# Patient Record
Sex: MALE | Race: WHITE | NOT HISPANIC OR LATINO | Employment: OTHER | URBAN - METROPOLITAN AREA
[De-identification: names, ages, dates, MRNs, and addresses within clinical notes are randomized per-mention and may not be internally consistent; named-entity substitution may affect disease eponyms.]

---

## 2017-02-28 ENCOUNTER — ALLSCRIPTS OFFICE VISIT (OUTPATIENT)
Dept: OTHER | Facility: OTHER | Age: 72
End: 2017-02-28

## 2017-04-03 ENCOUNTER — HOSPITAL ENCOUNTER (OUTPATIENT)
Dept: RADIOLOGY | Facility: CLINIC | Age: 72
Discharge: HOME | End: 2017-04-03
Admitting: PHYSICIAN ASSISTANT
Payer: MEDICARE

## 2017-04-03 ENCOUNTER — OFFICE VISIT (OUTPATIENT)
Dept: URGENT CARE | Facility: CLINIC | Age: 72
End: 2017-04-03
Payer: MEDICARE

## 2017-04-03 DIAGNOSIS — R05.9 COUGH: ICD-10-CM

## 2017-04-03 PROCEDURE — 71020 HB CHEST X-RAY 2VW FRONTAL&LATL: CPT

## 2017-04-03 PROCEDURE — 94640 AIRWAY INHALATION TREATMENT: CPT

## 2017-04-03 PROCEDURE — G0463 HOSPITAL OUTPT CLINIC VISIT: HCPCS

## 2017-04-03 PROCEDURE — 99214 OFFICE O/P EST MOD 30 MIN: CPT

## 2017-05-23 ENCOUNTER — GENERIC CONVERSION - ENCOUNTER (OUTPATIENT)
Dept: OTHER | Facility: OTHER | Age: 72
End: 2017-05-23

## 2017-05-30 ENCOUNTER — GENERIC CONVERSION - ENCOUNTER (OUTPATIENT)
Dept: OTHER | Facility: OTHER | Age: 72
End: 2017-05-30

## 2017-06-16 ENCOUNTER — APPOINTMENT (OUTPATIENT)
Dept: RADIOLOGY | Facility: MEDICAL CENTER | Age: 72
End: 2017-06-16
Payer: MEDICARE

## 2017-06-16 ENCOUNTER — ALLSCRIPTS OFFICE VISIT (OUTPATIENT)
Dept: OTHER | Facility: OTHER | Age: 72
End: 2017-06-16

## 2017-06-16 ENCOUNTER — GENERIC CONVERSION - ENCOUNTER (OUTPATIENT)
Dept: OTHER | Facility: OTHER | Age: 72
End: 2017-06-16

## 2017-06-16 DIAGNOSIS — I10 ESSENTIAL (PRIMARY) HYPERTENSION: ICD-10-CM

## 2017-06-16 DIAGNOSIS — Z12.5 ENCOUNTER FOR SCREENING FOR MALIGNANT NEOPLASM OF PROSTATE: ICD-10-CM

## 2017-06-16 DIAGNOSIS — M19.011 PRIMARY OSTEOARTHRITIS OF RIGHT SHOULDER: ICD-10-CM

## 2017-06-16 DIAGNOSIS — M25.50 PAIN IN JOINT: ICD-10-CM

## 2017-06-16 PROCEDURE — 72050 X-RAY EXAM NECK SPINE 4/5VWS: CPT

## 2017-06-16 PROCEDURE — 73030 X-RAY EXAM OF SHOULDER: CPT

## 2017-06-22 ENCOUNTER — GENERIC CONVERSION - ENCOUNTER (OUTPATIENT)
Dept: OTHER | Facility: OTHER | Age: 72
End: 2017-06-22

## 2017-06-22 ENCOUNTER — APPOINTMENT (OUTPATIENT)
Dept: PHYSICAL THERAPY | Facility: HOME HEALTHCARE | Age: 72
End: 2017-06-22
Payer: MEDICARE

## 2017-06-22 DIAGNOSIS — M19.011 PRIMARY OSTEOARTHRITIS OF RIGHT SHOULDER: ICD-10-CM

## 2017-06-22 PROCEDURE — G8981 BODY POS CURRENT STATUS: HCPCS

## 2017-06-22 PROCEDURE — 97110 THERAPEUTIC EXERCISES: CPT

## 2017-06-22 PROCEDURE — G8982 BODY POS GOAL STATUS: HCPCS

## 2017-06-22 PROCEDURE — 97535 SELF CARE MNGMENT TRAINING: CPT

## 2017-06-22 PROCEDURE — 97161 PT EVAL LOW COMPLEX 20 MIN: CPT

## 2017-06-26 ENCOUNTER — APPOINTMENT (OUTPATIENT)
Dept: PHYSICAL THERAPY | Facility: HOME HEALTHCARE | Age: 72
End: 2017-06-26
Payer: MEDICARE

## 2017-06-26 PROCEDURE — 97150 GROUP THERAPEUTIC PROCEDURES: CPT

## 2017-06-26 PROCEDURE — 97140 MANUAL THERAPY 1/> REGIONS: CPT

## 2017-06-28 ENCOUNTER — ALLSCRIPTS OFFICE VISIT (OUTPATIENT)
Dept: OTHER | Facility: OTHER | Age: 72
End: 2017-06-28

## 2017-06-30 ENCOUNTER — APPOINTMENT (OUTPATIENT)
Dept: PHYSICAL THERAPY | Facility: HOME HEALTHCARE | Age: 72
End: 2017-06-30
Payer: MEDICARE

## 2017-06-30 PROCEDURE — 97110 THERAPEUTIC EXERCISES: CPT

## 2017-06-30 PROCEDURE — 97150 GROUP THERAPEUTIC PROCEDURES: CPT

## 2017-06-30 PROCEDURE — 97140 MANUAL THERAPY 1/> REGIONS: CPT

## 2017-07-05 ENCOUNTER — APPOINTMENT (OUTPATIENT)
Dept: PHYSICAL THERAPY | Facility: HOME HEALTHCARE | Age: 72
End: 2017-07-05
Payer: MEDICARE

## 2017-07-05 PROCEDURE — 97150 GROUP THERAPEUTIC PROCEDURES: CPT

## 2017-07-05 PROCEDURE — 97110 THERAPEUTIC EXERCISES: CPT

## 2017-07-05 PROCEDURE — 97140 MANUAL THERAPY 1/> REGIONS: CPT

## 2017-07-07 ENCOUNTER — APPOINTMENT (OUTPATIENT)
Dept: PHYSICAL THERAPY | Facility: HOME HEALTHCARE | Age: 72
End: 2017-07-07
Payer: MEDICARE

## 2017-07-07 PROCEDURE — 97110 THERAPEUTIC EXERCISES: CPT

## 2017-07-07 PROCEDURE — 97140 MANUAL THERAPY 1/> REGIONS: CPT

## 2017-07-12 ENCOUNTER — APPOINTMENT (OUTPATIENT)
Dept: PHYSICAL THERAPY | Facility: HOME HEALTHCARE | Age: 72
End: 2017-07-12
Payer: MEDICARE

## 2017-07-12 PROCEDURE — 97110 THERAPEUTIC EXERCISES: CPT

## 2017-07-12 PROCEDURE — 97140 MANUAL THERAPY 1/> REGIONS: CPT

## 2017-07-14 ENCOUNTER — APPOINTMENT (OUTPATIENT)
Dept: PHYSICAL THERAPY | Facility: HOME HEALTHCARE | Age: 72
End: 2017-07-14
Payer: MEDICARE

## 2017-07-14 PROCEDURE — 97140 MANUAL THERAPY 1/> REGIONS: CPT

## 2017-07-14 PROCEDURE — 97150 GROUP THERAPEUTIC PROCEDURES: CPT

## 2017-07-17 ENCOUNTER — APPOINTMENT (OUTPATIENT)
Dept: PHYSICAL THERAPY | Facility: HOME HEALTHCARE | Age: 72
End: 2017-07-17
Payer: MEDICARE

## 2017-07-17 PROCEDURE — 97110 THERAPEUTIC EXERCISES: CPT

## 2017-07-17 PROCEDURE — 97140 MANUAL THERAPY 1/> REGIONS: CPT

## 2017-07-20 ENCOUNTER — APPOINTMENT (OUTPATIENT)
Dept: PHYSICAL THERAPY | Facility: HOME HEALTHCARE | Age: 72
End: 2017-07-20
Payer: MEDICARE

## 2017-07-20 PROCEDURE — 97164 PT RE-EVAL EST PLAN CARE: CPT

## 2017-07-20 PROCEDURE — 97110 THERAPEUTIC EXERCISES: CPT

## 2017-07-20 PROCEDURE — G8982 BODY POS GOAL STATUS: HCPCS

## 2017-07-20 PROCEDURE — G8981 BODY POS CURRENT STATUS: HCPCS

## 2017-07-26 ENCOUNTER — APPOINTMENT (OUTPATIENT)
Dept: PHYSICAL THERAPY | Facility: HOME HEALTHCARE | Age: 72
End: 2017-07-26
Payer: MEDICARE

## 2017-07-26 PROCEDURE — 97140 MANUAL THERAPY 1/> REGIONS: CPT

## 2017-07-26 PROCEDURE — 97110 THERAPEUTIC EXERCISES: CPT

## 2017-07-28 ENCOUNTER — APPOINTMENT (OUTPATIENT)
Dept: PHYSICAL THERAPY | Facility: HOME HEALTHCARE | Age: 72
End: 2017-07-28
Payer: MEDICARE

## 2017-07-28 PROCEDURE — 97140 MANUAL THERAPY 1/> REGIONS: CPT

## 2017-07-28 PROCEDURE — 97110 THERAPEUTIC EXERCISES: CPT

## 2017-07-31 ENCOUNTER — APPOINTMENT (OUTPATIENT)
Dept: PHYSICAL THERAPY | Facility: HOME HEALTHCARE | Age: 72
End: 2017-07-31
Payer: MEDICARE

## 2017-07-31 PROCEDURE — 97110 THERAPEUTIC EXERCISES: CPT

## 2017-07-31 PROCEDURE — 97140 MANUAL THERAPY 1/> REGIONS: CPT

## 2017-08-02 ENCOUNTER — APPOINTMENT (OUTPATIENT)
Dept: PHYSICAL THERAPY | Facility: HOME HEALTHCARE | Age: 72
End: 2017-08-02
Payer: MEDICARE

## 2017-08-02 PROCEDURE — 97110 THERAPEUTIC EXERCISES: CPT

## 2017-08-02 PROCEDURE — 97140 MANUAL THERAPY 1/> REGIONS: CPT

## 2017-08-22 ENCOUNTER — ALLSCRIPTS OFFICE VISIT (OUTPATIENT)
Dept: OTHER | Facility: OTHER | Age: 72
End: 2017-08-22

## 2017-08-22 DIAGNOSIS — Z98.890 OTHER SPECIFIED POSTPROCEDURAL STATES: ICD-10-CM

## 2017-08-22 DIAGNOSIS — M19.011 PRIMARY OSTEOARTHRITIS OF RIGHT SHOULDER: ICD-10-CM

## 2017-08-23 ENCOUNTER — GENERIC CONVERSION - ENCOUNTER (OUTPATIENT)
Dept: OTHER | Facility: OTHER | Age: 72
End: 2017-08-23

## 2017-08-23 ENCOUNTER — HOSPITAL ENCOUNTER (OUTPATIENT)
Dept: NON INVASIVE DIAGNOSTICS | Facility: HOSPITAL | Age: 72
Discharge: HOME | End: 2017-08-23
Payer: MEDICARE

## 2017-08-23 ENCOUNTER — TRANSCRIBE ORDERS (OUTPATIENT)
Dept: LAB | Facility: MEDICAL CENTER | Age: 72
End: 2017-08-23

## 2017-08-23 ENCOUNTER — APPOINTMENT (OUTPATIENT)
Dept: LAB | Facility: MEDICAL CENTER | Age: 72
End: 2017-08-23
Payer: MEDICARE

## 2017-08-23 ENCOUNTER — TRANSCRIBE ORDERS (OUTPATIENT)
Dept: ADMINISTRATIVE | Facility: HOSPITAL | Age: 72
End: 2017-08-23

## 2017-08-23 DIAGNOSIS — M19.011 ARTHRITIS OF RIGHT SHOULDER REGION: Primary | ICD-10-CM

## 2017-08-23 DIAGNOSIS — M19.011 ARTHRITIS OF RIGHT SHOULDER REGION: ICD-10-CM

## 2017-08-23 DIAGNOSIS — I10 ESSENTIAL (PRIMARY) HYPERTENSION: ICD-10-CM

## 2017-08-23 DIAGNOSIS — M19.011 PRIMARY OSTEOARTHRITIS OF RIGHT SHOULDER: ICD-10-CM

## 2017-08-23 DIAGNOSIS — Z12.5 ENCOUNTER FOR SCREENING FOR MALIGNANT NEOPLASM OF PROSTATE: ICD-10-CM

## 2017-08-23 LAB
ALBUMIN SERPL BCP-MCNC: 3.1 G/DL (ref 3.5–5)
ALP SERPL-CCNC: 84 U/L (ref 46–116)
ALT SERPL W P-5'-P-CCNC: 25 U/L (ref 12–78)
ANION GAP SERPL CALCULATED.3IONS-SCNC: 4 MMOL/L (ref 4–13)
AST SERPL W P-5'-P-CCNC: 19 U/L (ref 5–45)
BASOPHILS # BLD AUTO: 0.03 THOUSANDS/ΜL (ref 0–0.1)
BASOPHILS NFR BLD AUTO: 1 % (ref 0–1)
BILIRUB SERPL-MCNC: 0.49 MG/DL (ref 0.2–1)
BUN SERPL-MCNC: 22 MG/DL (ref 5–25)
CALCIUM SERPL-MCNC: 9 MG/DL (ref 8.3–10.1)
CHLORIDE SERPL-SCNC: 108 MMOL/L (ref 100–108)
CHOLEST SERPL-MCNC: 145 MG/DL (ref 50–200)
CO2 SERPL-SCNC: 27 MMOL/L (ref 21–32)
CREAT SERPL-MCNC: 0.82 MG/DL (ref 0.6–1.3)
EOSINOPHIL # BLD AUTO: 0.15 THOUSAND/ΜL (ref 0–0.61)
EOSINOPHIL NFR BLD AUTO: 3 % (ref 0–6)
ERYTHROCYTE [DISTWIDTH] IN BLOOD BY AUTOMATED COUNT: 14 % (ref 11.6–15.1)
GFR SERPL CREATININE-BSD FRML MDRD: 88 ML/MIN/1.73SQ M
GLUCOSE P FAST SERPL-MCNC: 81 MG/DL (ref 65–99)
HCT VFR BLD AUTO: 41 % (ref 36.5–49.3)
HDLC SERPL-MCNC: 59 MG/DL (ref 40–60)
HGB BLD-MCNC: 12.8 G/DL (ref 12–17)
INR PPP: 1 (ref 0.86–1.16)
LDLC SERPL CALC-MCNC: 79 MG/DL (ref 0–100)
LYMPHOCYTES # BLD AUTO: 1.42 THOUSANDS/ΜL (ref 0.6–4.47)
LYMPHOCYTES NFR BLD AUTO: 24 % (ref 14–44)
MCH RBC QN AUTO: 26.4 PG (ref 26.8–34.3)
MCHC RBC AUTO-ENTMCNC: 31.2 G/DL (ref 31.4–37.4)
MCV RBC AUTO: 85 FL (ref 82–98)
MONOCYTES # BLD AUTO: 0.54 THOUSAND/ΜL (ref 0.17–1.22)
MONOCYTES NFR BLD AUTO: 9 % (ref 4–12)
NEUTROPHILS # BLD AUTO: 3.74 THOUSANDS/ΜL (ref 1.85–7.62)
NEUTS SEG NFR BLD AUTO: 63 % (ref 43–75)
NRBC BLD AUTO-RTO: 0 /100 WBCS
PLATELET # BLD AUTO: 336 THOUSANDS/UL (ref 149–390)
PMV BLD AUTO: 8.5 FL (ref 8.9–12.7)
POTASSIUM SERPL-SCNC: 4.3 MMOL/L (ref 3.5–5.3)
PROT SERPL-MCNC: 8.1 G/DL (ref 6.4–8.2)
PROTHROMBIN TIME: 13.2 SECONDS (ref 12.1–14.4)
PSA SERPL-MCNC: 2 NG/ML (ref 0–4)
RBC # BLD AUTO: 4.85 MILLION/UL (ref 3.88–5.62)
SODIUM SERPL-SCNC: 139 MMOL/L (ref 136–145)
TRIGL SERPL-MCNC: 36 MG/DL
WBC # BLD AUTO: 5.9 THOUSAND/UL (ref 4.31–10.16)

## 2017-08-23 PROCEDURE — 85610 PROTHROMBIN TIME: CPT

## 2017-08-23 PROCEDURE — 80053 COMPREHEN METABOLIC PANEL: CPT

## 2017-08-23 PROCEDURE — 85025 COMPLETE CBC W/AUTO DIFF WBC: CPT

## 2017-08-23 PROCEDURE — 93005 ELECTROCARDIOGRAM TRACING: CPT

## 2017-08-23 PROCEDURE — 80061 LIPID PANEL: CPT

## 2017-08-23 PROCEDURE — 36415 COLL VENOUS BLD VENIPUNCTURE: CPT

## 2017-08-23 PROCEDURE — G0103 PSA SCREENING: HCPCS

## 2017-08-24 LAB
ATRIAL RATE: 75 BPM
P AXIS: 45 DEGREES
PR INTERVAL: 180 MS
QRS AXIS: 28 DEGREES
QRSD INTERVAL: 96 MS
QT INTERVAL: 364 MS
QTC INTERVAL: 406 MS
T WAVE AXIS: 35 DEGREES
VENTRICULAR RATE: 75 BPM

## 2017-08-25 ENCOUNTER — ANESTHESIA EVENT (OUTPATIENT)
Dept: PERIOP | Facility: HOSPITAL | Age: 72
End: 2017-08-25
Payer: MEDICARE

## 2017-08-27 ENCOUNTER — ANESTHESIA (OUTPATIENT)
Dept: ANESTHESIOLOGY | Facility: HOSPITAL | Age: 72
End: 2017-08-27

## 2017-08-27 ENCOUNTER — ANESTHESIA EVENT (OUTPATIENT)
Dept: ANESTHESIOLOGY | Facility: HOSPITAL | Age: 72
End: 2017-08-27

## 2017-08-28 ENCOUNTER — ANESTHESIA (OUTPATIENT)
Dept: PERIOP | Facility: HOSPITAL | Age: 72
End: 2017-08-28
Payer: MEDICARE

## 2017-08-28 ENCOUNTER — HOSPITAL ENCOUNTER (OUTPATIENT)
Facility: HOSPITAL | Age: 72
Setting detail: OUTPATIENT SURGERY
Discharge: HOME | End: 2017-08-28
Attending: ORTHOPAEDIC SURGERY | Admitting: ORTHOPAEDIC SURGERY
Payer: MEDICARE

## 2017-08-28 VITALS
OXYGEN SATURATION: 95 % | TEMPERATURE: 98 F | SYSTOLIC BLOOD PRESSURE: 112 MMHG | HEIGHT: 65 IN | DIASTOLIC BLOOD PRESSURE: 67 MMHG | BODY MASS INDEX: 24.66 KG/M2 | RESPIRATION RATE: 16 BRPM | WEIGHT: 148 LBS | HEART RATE: 85 BPM

## 2017-08-28 PROBLEM — M12.811 ROTATOR CUFF TEAR ARTHROPATHY OF RIGHT SHOULDER: Status: ACTIVE | Noted: 2017-08-28

## 2017-08-28 PROBLEM — M75.101 ROTATOR CUFF TEAR ARTHROPATHY OF RIGHT SHOULDER: Status: ACTIVE | Noted: 2017-08-28

## 2017-08-28 RX ORDER — LIDOCAINE HYDROCHLORIDE AND EPINEPHRINE 10; 10 MG/ML; UG/ML
INJECTION, SOLUTION INFILTRATION; PERINEURAL AS NEEDED
Status: DISCONTINUED | OUTPATIENT
Start: 2017-08-28 | End: 2017-08-28 | Stop reason: HOSPADM

## 2017-08-28 RX ORDER — OXYCODONE HYDROCHLORIDE 5 MG/1
5 TABLET ORAL EVERY 4 HOURS PRN
Qty: 30 TABLET | Refills: 0 | Status: SHIPPED | OUTPATIENT
Start: 2017-08-28 | End: 2017-09-07

## 2017-08-28 RX ORDER — MAGNESIUM HYDROXIDE 1200 MG/15ML
LIQUID ORAL AS NEEDED
Status: DISCONTINUED | OUTPATIENT
Start: 2017-08-28 | End: 2017-08-28 | Stop reason: HOSPADM

## 2017-08-28 RX ORDER — MIDAZOLAM HYDROCHLORIDE 1 MG/ML
INJECTION INTRAMUSCULAR; INTRAVENOUS AS NEEDED
Status: DISCONTINUED | OUTPATIENT
Start: 2017-08-28 | End: 2017-08-28 | Stop reason: SURG

## 2017-08-28 RX ORDER — FENTANYL CITRATE 50 UG/ML
INJECTION, SOLUTION INTRAMUSCULAR; INTRAVENOUS AS NEEDED
Status: DISCONTINUED | OUTPATIENT
Start: 2017-08-28 | End: 2017-08-28 | Stop reason: SURG

## 2017-08-28 RX ORDER — SUCCINYLCHOLINE CHLORIDE 20 MG/ML
INJECTION INTRAMUSCULAR; INTRAVENOUS AS NEEDED
Status: DISCONTINUED | OUTPATIENT
Start: 2017-08-28 | End: 2017-08-28 | Stop reason: SURG

## 2017-08-28 RX ORDER — SODIUM CHLORIDE, SODIUM LACTATE, POTASSIUM CHLORIDE, CALCIUM CHLORIDE 600; 310; 30; 20 MG/100ML; MG/100ML; MG/100ML; MG/100ML
125 INJECTION, SOLUTION INTRAVENOUS CONTINUOUS
Status: DISCONTINUED | OUTPATIENT
Start: 2017-08-28 | End: 2017-08-28 | Stop reason: HOSPADM

## 2017-08-28 RX ORDER — OXYCODONE HYDROCHLORIDE AND ACETAMINOPHEN 5; 325 MG/1; MG/1
2 TABLET ORAL EVERY 4 HOURS PRN
Status: DISCONTINUED | OUTPATIENT
Start: 2017-08-28 | End: 2017-08-28 | Stop reason: HOSPADM

## 2017-08-28 RX ORDER — EPHEDRINE SULFATE 50 MG/ML
INJECTION, SOLUTION INTRAVENOUS AS NEEDED
Status: DISCONTINUED | OUTPATIENT
Start: 2017-08-28 | End: 2017-08-28 | Stop reason: SURG

## 2017-08-28 RX ORDER — LIDOCAINE HYDROCHLORIDE 10 MG/ML
INJECTION, SOLUTION INFILTRATION; PERINEURAL AS NEEDED
Status: DISCONTINUED | OUTPATIENT
Start: 2017-08-28 | End: 2017-08-28 | Stop reason: SURG

## 2017-08-28 RX ORDER — FENTANYL CITRATE/PF 50 MCG/ML
25 SYRINGE (ML) INJECTION AS NEEDED
Status: DISCONTINUED | OUTPATIENT
Start: 2017-08-28 | End: 2017-08-28 | Stop reason: HOSPADM

## 2017-08-28 RX ORDER — PROPOFOL 10 MG/ML
INJECTION, EMULSION INTRAVENOUS AS NEEDED
Status: DISCONTINUED | OUTPATIENT
Start: 2017-08-28 | End: 2017-08-28 | Stop reason: SURG

## 2017-08-28 RX ORDER — ONDANSETRON 2 MG/ML
INJECTION INTRAMUSCULAR; INTRAVENOUS AS NEEDED
Status: DISCONTINUED | OUTPATIENT
Start: 2017-08-28 | End: 2017-08-28 | Stop reason: SURG

## 2017-08-28 RX ORDER — ONDANSETRON 2 MG/ML
4 INJECTION INTRAMUSCULAR; INTRAVENOUS ONCE AS NEEDED
Status: DISCONTINUED | OUTPATIENT
Start: 2017-08-28 | End: 2017-08-28 | Stop reason: HOSPADM

## 2017-08-28 RX ADMIN — ONDANSETRON HYDROCHLORIDE 4 MG: 2 INJECTION, SOLUTION INTRAVENOUS at 08:55

## 2017-08-28 RX ADMIN — SODIUM CHLORIDE, SODIUM LACTATE, POTASSIUM CHLORIDE, AND CALCIUM CHLORIDE 125 ML/HR: .6; .31; .03; .02 INJECTION, SOLUTION INTRAVENOUS at 07:21

## 2017-08-28 RX ADMIN — EPHEDRINE SULFATE 5 MG: 50 INJECTION, SOLUTION INTRAMUSCULAR; INTRAVENOUS; SUBCUTANEOUS at 08:33

## 2017-08-28 RX ADMIN — FENTANYL CITRATE 50 MCG: 50 INJECTION, SOLUTION INTRAMUSCULAR; INTRAVENOUS at 07:38

## 2017-08-28 RX ADMIN — PROPOFOL 200 MG: 10 INJECTION, EMULSION INTRAVENOUS at 08:03

## 2017-08-28 RX ADMIN — SUCCINYLCHOLINE CHLORIDE 100 MG: 20 INJECTION, SOLUTION INTRAMUSCULAR; INTRAVENOUS at 08:04

## 2017-08-28 RX ADMIN — LIDOCAINE HYDROCHLORIDE 50 MG: 10 INJECTION, SOLUTION INFILTRATION; PERINEURAL at 08:03

## 2017-08-28 RX ADMIN — DEXAMETHASONE SODIUM PHOSPHATE 10 MG: 10 INJECTION INTRAMUSCULAR; INTRAVENOUS at 08:15

## 2017-08-28 RX ADMIN — CEFAZOLIN SODIUM 1000 MG: 1 SOLUTION INTRAVENOUS at 07:58

## 2017-08-28 RX ADMIN — MIDAZOLAM HYDROCHLORIDE 2 MG: 1 INJECTION, SOLUTION INTRAMUSCULAR; INTRAVENOUS at 07:37

## 2017-09-01 ENCOUNTER — GENERIC CONVERSION - ENCOUNTER (OUTPATIENT)
Dept: OTHER | Facility: OTHER | Age: 72
End: 2017-09-01

## 2017-09-01 ENCOUNTER — APPOINTMENT (OUTPATIENT)
Dept: PHYSICAL THERAPY | Facility: HOME HEALTHCARE | Age: 72
End: 2017-09-01
Payer: MEDICARE

## 2017-09-01 PROCEDURE — 97110 THERAPEUTIC EXERCISES: CPT

## 2017-09-01 PROCEDURE — 97161 PT EVAL LOW COMPLEX 20 MIN: CPT

## 2017-09-01 PROCEDURE — G8985 CARRY GOAL STATUS: HCPCS

## 2017-09-01 PROCEDURE — G8984 CARRY CURRENT STATUS: HCPCS

## 2017-09-06 ENCOUNTER — APPOINTMENT (OUTPATIENT)
Dept: PHYSICAL THERAPY | Facility: HOME HEALTHCARE | Age: 72
End: 2017-09-06
Payer: MEDICARE

## 2017-09-06 PROCEDURE — 97140 MANUAL THERAPY 1/> REGIONS: CPT

## 2017-09-06 PROCEDURE — 97110 THERAPEUTIC EXERCISES: CPT

## 2017-09-07 ENCOUNTER — APPOINTMENT (OUTPATIENT)
Dept: PHYSICAL THERAPY | Facility: HOME HEALTHCARE | Age: 72
End: 2017-09-07
Payer: MEDICARE

## 2017-09-07 PROCEDURE — 97140 MANUAL THERAPY 1/> REGIONS: CPT

## 2017-09-07 PROCEDURE — 97110 THERAPEUTIC EXERCISES: CPT

## 2017-09-08 ENCOUNTER — GENERIC CONVERSION - ENCOUNTER (OUTPATIENT)
Dept: OTHER | Facility: OTHER | Age: 72
End: 2017-09-08

## 2017-09-12 ENCOUNTER — APPOINTMENT (OUTPATIENT)
Dept: PHYSICAL THERAPY | Facility: HOME HEALTHCARE | Age: 72
End: 2017-09-12
Payer: MEDICARE

## 2017-09-12 PROCEDURE — 97110 THERAPEUTIC EXERCISES: CPT

## 2017-09-12 PROCEDURE — 97140 MANUAL THERAPY 1/> REGIONS: CPT

## 2017-09-14 ENCOUNTER — APPOINTMENT (OUTPATIENT)
Dept: PHYSICAL THERAPY | Facility: HOME HEALTHCARE | Age: 72
End: 2017-09-14
Payer: MEDICARE

## 2017-09-14 PROCEDURE — 97150 GROUP THERAPEUTIC PROCEDURES: CPT

## 2017-09-14 PROCEDURE — 97110 THERAPEUTIC EXERCISES: CPT

## 2017-09-14 PROCEDURE — 97140 MANUAL THERAPY 1/> REGIONS: CPT

## 2017-09-19 ENCOUNTER — APPOINTMENT (OUTPATIENT)
Dept: PHYSICAL THERAPY | Facility: HOME HEALTHCARE | Age: 72
End: 2017-09-19
Payer: MEDICARE

## 2017-09-19 PROCEDURE — 97140 MANUAL THERAPY 1/> REGIONS: CPT

## 2017-09-19 PROCEDURE — 97110 THERAPEUTIC EXERCISES: CPT

## 2017-09-21 ENCOUNTER — APPOINTMENT (OUTPATIENT)
Dept: PHYSICAL THERAPY | Facility: HOME HEALTHCARE | Age: 72
End: 2017-09-21
Payer: MEDICARE

## 2017-09-21 PROCEDURE — 97110 THERAPEUTIC EXERCISES: CPT

## 2017-09-21 PROCEDURE — 97140 MANUAL THERAPY 1/> REGIONS: CPT

## 2017-09-26 ENCOUNTER — APPOINTMENT (OUTPATIENT)
Dept: PHYSICAL THERAPY | Facility: HOME HEALTHCARE | Age: 72
End: 2017-09-26
Payer: MEDICARE

## 2017-09-26 PROCEDURE — 97110 THERAPEUTIC EXERCISES: CPT

## 2017-09-26 PROCEDURE — 97140 MANUAL THERAPY 1/> REGIONS: CPT

## 2017-09-28 ENCOUNTER — APPOINTMENT (OUTPATIENT)
Dept: PHYSICAL THERAPY | Facility: HOME HEALTHCARE | Age: 72
End: 2017-09-28
Payer: MEDICARE

## 2017-09-28 PROCEDURE — 97110 THERAPEUTIC EXERCISES: CPT

## 2017-09-28 PROCEDURE — 97140 MANUAL THERAPY 1/> REGIONS: CPT

## 2017-10-02 ENCOUNTER — APPOINTMENT (OUTPATIENT)
Dept: PHYSICAL THERAPY | Facility: HOME HEALTHCARE | Age: 72
End: 2017-10-02
Payer: MEDICARE

## 2017-10-02 PROCEDURE — 97110 THERAPEUTIC EXERCISES: CPT

## 2017-10-02 PROCEDURE — 97140 MANUAL THERAPY 1/> REGIONS: CPT

## 2017-10-04 ENCOUNTER — APPOINTMENT (OUTPATIENT)
Dept: PHYSICAL THERAPY | Facility: HOME HEALTHCARE | Age: 72
End: 2017-10-04
Payer: MEDICARE

## 2017-10-04 PROCEDURE — 97140 MANUAL THERAPY 1/> REGIONS: CPT

## 2017-10-04 PROCEDURE — G8986 CARRY D/C STATUS: HCPCS

## 2017-10-04 PROCEDURE — 97164 PT RE-EVAL EST PLAN CARE: CPT

## 2017-10-04 PROCEDURE — G8985 CARRY GOAL STATUS: HCPCS

## 2017-10-04 PROCEDURE — 97110 THERAPEUTIC EXERCISES: CPT

## 2017-10-04 PROCEDURE — G8984 CARRY CURRENT STATUS: HCPCS

## 2017-10-07 ENCOUNTER — TRANSCRIBE ORDERS (OUTPATIENT)
Dept: URGENT CARE | Facility: CLINIC | Age: 72
End: 2017-10-07

## 2017-10-07 ENCOUNTER — OFFICE VISIT (OUTPATIENT)
Dept: URGENT CARE | Facility: CLINIC | Age: 72
End: 2017-10-07
Payer: MEDICARE

## 2017-10-07 ENCOUNTER — APPOINTMENT (OUTPATIENT)
Dept: RADIOLOGY | Facility: CLINIC | Age: 72
End: 2017-10-07
Payer: MEDICARE

## 2017-10-07 DIAGNOSIS — M79.643 PAIN OF HAND: ICD-10-CM

## 2017-10-07 PROCEDURE — G0463 HOSPITAL OUTPT CLINIC VISIT: HCPCS

## 2017-10-07 PROCEDURE — 99213 OFFICE O/P EST LOW 20 MIN: CPT

## 2017-10-07 PROCEDURE — 73130 X-RAY EXAM OF HAND: CPT

## 2017-10-09 ENCOUNTER — GENERIC CONVERSION - ENCOUNTER (OUTPATIENT)
Dept: OTHER | Facility: OTHER | Age: 72
End: 2017-10-09

## 2017-10-14 NOTE — PROGRESS NOTES
Assessment  1  Arthritis (005 08) (I89 90)    Discussion/Summary  Discussion Summary:   Follow up with Dr Ava Vital at next appt if no improvement  Medication Side Effects Reviewed: Possible side effects of new medications were reviewed with the patient/guardian today  Understands and agrees with treatment plan: The treatment plan was reviewed with the patient/guardian  The patient/guardian understands and agrees with the treatment plan   Counseling Documentation With Imm: The patient was counseled regarding instructions for management  Chief Complaint  1  Hand Problem  Chief Complaint Free Text Note Form: C/O R wrist and R thumb pain x 2 weeks      History of Present Illness  HPI: having right wrist/hand pain for past 2 weeks after he had a difficult time pressing a button in on an emergency brake  Pain is worse on motion but also has pain when resting in certain positions  Wrist is swollen, no redness or bruising  Hospital Based Practices Required Assessment:   Abuse And Domestic Violence Screen    Yes, the patient is safe at home  -- The patient states no one is hurting them  Depression And Suicide Screen  No, the patient has not had thoughts of hurting themself  No, the patient has not felt depressed in the past 7 days  Prefered Language is  [place]  Primary Language is  English  Hand Problem: Verlie Collet presents with complaints of hand problem  Associated symptoms include pain-- and-- difficulty opening jars, but-- no stiffness,-- no discoloration,-- no drainage,-- no warmth,-- no fever,-- no chills,-- no lymphadenopathy,-- no lump in hand,-- no numbness,-- no tingling,-- no redness,-- no weakness,-- no joint deformity-- and-- no muscle atrophy  Review of Systems  Focused-Male:   Constitutional: no fever-- and-- no chills  ENT: no earache,-- no hearing loss-- and-- no nasal discharge  Cardiovascular: no chest pain-- and-- no palpitations     Respiratory: no shortness of breath-- and-- no cough  Gastrointestinal: no nausea-- and-- no vomiting  Integumentary: no rashes  Neurological: no headache,-- no numbness,-- no tingling-- and-- no dizziness  ROS Reviewed:   ROS reviewed  Active Problems  1  Advance directive on file (V49 89) (Z78 9)   2  Depression (311) (F32 9)   3  Dyslipidemia (272 4) (E78 5)   4  Encounter for colorectal cancer screening (V76 51) (Z12 11,Z12 12)   5  Encounter for prostate cancer screening (V76 44) (Z12 5)   6  Erectile dysfunction of non-organic origin (302 72) (F52 21)   7  Exercise counseling (V65 41) (Z71 82)   8  Hypertension (401 9) (I10)   9  Malignant melanoma of skin (172 9) (C43 9)   10  Medicare annual wellness visit, subsequent (V70 0) (Z00 00)   11  Primary osteoarthritis of both shoulders (715 11) (M19 011,M19 012)   12  Screening for depression (V79 0) (Z13 89)   13  Screening for genitourinary condition (V81 6) (Z13 89)   14  Screening for neurological condition (V80 09) (Z13 89)   15  Special screening examination for neoplasm of prostate (V76 44) (Z12 5)   16  Status post shoulder surgery (V45 89) (Y61 174)    Past Medical History  1  History of Acute gouty arthropathy (274 01) (M10 9)   2  History of Acute pharyngitis (462) (J02 9)   3  History of Fracture of rib, closed (807 00) (S22 39XA)   4  History of acute bronchitis (V12 69) (Z87 09)   5  History of bacterial pneumonia (V12 61) (Z87 01)   6  History of joint pain (V13 59) (Z87 39)  Active Problems And Past Medical History Reviewed: The active problems and past medical history were reviewed and updated today  Family History  Mother    1  Family history of malignant neoplasm (V16 9) (Z80 9)  Family History    2  Family history of aortic aneurysm (V17 49) (Z82 49)  Family History Reviewed: The family history was reviewed and updated today         Social History   · Advance directive on file (J40 72) (Z73 8)   · Being A Social Drinker   · Never a smoker   · Occupation: Retired    Surgical History  1  History of Shaving Of Lesion Mohs' Technique  Surgical History Reviewed: The surgical history was reviewed and updated today  Current Meds   1  Aspirin 81 MG Oral Tablet Delayed Release; Take 1 tablet daily Recorded   2  B Complex Vitamins CAPS Recorded   3  Losartan Potassium 50 MG Oral Tablet; TAKE ONE TABLET BY MOUTH EVERY DAY; Therapy: 91YOX5174 to (OCNBORLU:[id number])  Requested for: 44JXW0161; Last   Rx:[id number] Ordered   4  Vitamin D 2000 UNIT Oral Capsule; TAKE 1 CAPSULE Daily Recorded  Medication List Reviewed: The medication list was reviewed and updated today  Allergies  1  No Known Drug Allergies    Vitals  Signs   Recorded: 85LPH8819 11:16AM   Temperature: 98 2 F  Heart Rate: 90  Respiration: 20  Systolic: 506  Diastolic: 78  Height: 5 ft 5 in  Weight: 145 lb   BMI Calculated: 24 13  BSA Calculated: 1 73  O2 Saturation: 97    Physical Exam    Constitutional   General appearance: No acute distress, well appearing and well nourished  Eyes   Conjunctiva and lids: No swelling, erythema, or discharge  Ears, Nose, Mouth, and Throat   External inspection of ears and nose: Normal     Pulmonary   Respiratory effort: No increased work of breathing or signs of respiratory distress  Lymphatic   Palpation of lymph nodes in neck: No lymphadenopathy  Musculoskeletal   Gait and station: Normal  -- right wrist and 1st metacarpal with mild swelling, no bruising, erythema, warmth; mild tenderness over right 1st metacarpal, neuro/vasc intact  Skin   Skin and subcutaneous tissue: Normal without rashes or lesions  Psychiatric   Mood and affect: Normal        Results/Data  Diagnostic Studies Reviewed: I personally reviewed the films/images/results in the office today  My interpretation follows  X-ray Review right hand - degenerative changes 1st MCP joint        Procedure  Procedure: Splint application  of the right hand 1st metacarpal    Material: Preformed velcro splint  Type: Thumb spica with the joint in a neutral position  Patient Status:  neurovascular exam after splint/cast application was unchanged  Activity Restrictions: None  Future Appointments    Date/Time Provider Specialty Site   11/07/2017 08:40 AM GOLDY Worthy   Orthopedic Surgery [facility]     Signatures   Electronically signed by : FADY Latif; Oct  7 2017 11:53AM EST                       (Author)    Electronically signed by : NENO Hayward ; Oct 13 2017 10:35AM EST                       (Co-author)

## 2017-11-07 ENCOUNTER — APPOINTMENT (OUTPATIENT)
Dept: RADIOLOGY | Facility: MEDICAL CENTER | Age: 72
End: 2017-11-07
Payer: MEDICARE

## 2017-11-07 ENCOUNTER — GENERIC CONVERSION - ENCOUNTER (OUTPATIENT)
Dept: OTHER | Facility: OTHER | Age: 72
End: 2017-11-07

## 2017-11-07 DIAGNOSIS — M19.012 PRIMARY OSTEOARTHRITIS OF LEFT SHOULDER: ICD-10-CM

## 2017-11-07 DIAGNOSIS — M19.011 PRIMARY OSTEOARTHRITIS OF RIGHT SHOULDER: ICD-10-CM

## 2017-11-07 DIAGNOSIS — M18.11 PRIMARY OSTEOARTHRITIS OF FIRST CARPOMETACARPAL JOINT OF RIGHT HAND: ICD-10-CM

## 2017-11-07 PROCEDURE — 73030 X-RAY EXAM OF SHOULDER: CPT

## 2017-11-08 ENCOUNTER — GENERIC CONVERSION - ENCOUNTER (OUTPATIENT)
Dept: OTHER | Facility: OTHER | Age: 72
End: 2017-11-08

## 2017-11-09 ENCOUNTER — APPOINTMENT (OUTPATIENT)
Dept: OCCUPATIONAL THERAPY | Facility: HOME HEALTHCARE | Age: 72
End: 2017-11-09
Payer: COMMERCIAL

## 2017-11-09 PROCEDURE — 97110 THERAPEUTIC EXERCISES: CPT

## 2017-11-09 PROCEDURE — 97166 OT EVAL MOD COMPLEX 45 MIN: CPT

## 2017-11-09 PROCEDURE — G8990 OTHER PT/OT CURRENT STATUS: HCPCS

## 2017-11-09 PROCEDURE — G8991 OTHER PT/OT GOAL STATUS: HCPCS

## 2017-11-13 ENCOUNTER — APPOINTMENT (OUTPATIENT)
Dept: OCCUPATIONAL THERAPY | Facility: HOME HEALTHCARE | Age: 72
End: 2017-11-13
Payer: COMMERCIAL

## 2017-11-13 PROCEDURE — 97110 THERAPEUTIC EXERCISES: CPT

## 2017-11-13 PROCEDURE — 97018 PARAFFIN BATH THERAPY: CPT

## 2017-11-15 ENCOUNTER — APPOINTMENT (OUTPATIENT)
Dept: OCCUPATIONAL THERAPY | Facility: HOME HEALTHCARE | Age: 72
End: 2017-11-15
Payer: COMMERCIAL

## 2017-11-15 PROCEDURE — 97018 PARAFFIN BATH THERAPY: CPT

## 2017-11-15 PROCEDURE — 97110 THERAPEUTIC EXERCISES: CPT

## 2017-11-20 ENCOUNTER — APPOINTMENT (OUTPATIENT)
Dept: OCCUPATIONAL THERAPY | Facility: HOME HEALTHCARE | Age: 72
End: 2017-11-20
Payer: COMMERCIAL

## 2017-11-20 PROCEDURE — 97035 APP MDLTY 1+ULTRASOUND EA 15: CPT

## 2017-11-20 PROCEDURE — 97018 PARAFFIN BATH THERAPY: CPT

## 2017-11-20 PROCEDURE — 97110 THERAPEUTIC EXERCISES: CPT

## 2017-11-27 ENCOUNTER — APPOINTMENT (OUTPATIENT)
Dept: OCCUPATIONAL THERAPY | Facility: HOME HEALTHCARE | Age: 72
End: 2017-11-27
Payer: COMMERCIAL

## 2017-11-27 PROCEDURE — 97018 PARAFFIN BATH THERAPY: CPT

## 2017-11-27 PROCEDURE — 97110 THERAPEUTIC EXERCISES: CPT

## 2017-11-27 PROCEDURE — 97035 APP MDLTY 1+ULTRASOUND EA 15: CPT

## 2017-11-28 ENCOUNTER — APPOINTMENT (OUTPATIENT)
Dept: OCCUPATIONAL THERAPY | Facility: HOME HEALTHCARE | Age: 72
End: 2017-11-28
Payer: COMMERCIAL

## 2017-11-29 ENCOUNTER — APPOINTMENT (OUTPATIENT)
Dept: OCCUPATIONAL THERAPY | Facility: HOME HEALTHCARE | Age: 72
End: 2017-11-29
Payer: COMMERCIAL

## 2017-11-29 PROCEDURE — 97018 PARAFFIN BATH THERAPY: CPT

## 2017-11-29 PROCEDURE — 97110 THERAPEUTIC EXERCISES: CPT

## 2017-11-29 PROCEDURE — 97035 APP MDLTY 1+ULTRASOUND EA 15: CPT

## 2017-12-04 ENCOUNTER — APPOINTMENT (OUTPATIENT)
Dept: OCCUPATIONAL THERAPY | Facility: HOME HEALTHCARE | Age: 72
End: 2017-12-04
Payer: COMMERCIAL

## 2017-12-04 PROCEDURE — 97018 PARAFFIN BATH THERAPY: CPT

## 2017-12-04 PROCEDURE — 97035 APP MDLTY 1+ULTRASOUND EA 15: CPT

## 2017-12-04 PROCEDURE — 97110 THERAPEUTIC EXERCISES: CPT

## 2017-12-06 ENCOUNTER — APPOINTMENT (OUTPATIENT)
Dept: OCCUPATIONAL THERAPY | Facility: HOME HEALTHCARE | Age: 72
End: 2017-12-06
Payer: COMMERCIAL

## 2017-12-06 PROCEDURE — 97110 THERAPEUTIC EXERCISES: CPT

## 2017-12-06 PROCEDURE — 97018 PARAFFIN BATH THERAPY: CPT

## 2017-12-12 ENCOUNTER — GENERIC CONVERSION - ENCOUNTER (OUTPATIENT)
Dept: FAMILY MEDICINE CLINIC | Facility: CLINIC | Age: 72
End: 2017-12-12

## 2017-12-12 ENCOUNTER — APPOINTMENT (OUTPATIENT)
Dept: OCCUPATIONAL THERAPY | Facility: HOME HEALTHCARE | Age: 72
End: 2017-12-12
Payer: COMMERCIAL

## 2017-12-12 PROCEDURE — 97110 THERAPEUTIC EXERCISES: CPT

## 2017-12-12 PROCEDURE — G8990 OTHER PT/OT CURRENT STATUS: HCPCS

## 2017-12-12 PROCEDURE — 97018 PARAFFIN BATH THERAPY: CPT

## 2017-12-12 PROCEDURE — G8991 OTHER PT/OT GOAL STATUS: HCPCS

## 2017-12-12 PROCEDURE — 97168 OT RE-EVAL EST PLAN CARE: CPT

## 2017-12-14 ENCOUNTER — APPOINTMENT (OUTPATIENT)
Dept: OCCUPATIONAL THERAPY | Facility: HOME HEALTHCARE | Age: 72
End: 2017-12-14
Payer: COMMERCIAL

## 2017-12-14 PROCEDURE — 97018 PARAFFIN BATH THERAPY: CPT

## 2017-12-14 PROCEDURE — 97110 THERAPEUTIC EXERCISES: CPT

## 2017-12-18 ENCOUNTER — APPOINTMENT (OUTPATIENT)
Dept: OCCUPATIONAL THERAPY | Facility: HOME HEALTHCARE | Age: 72
End: 2017-12-18
Payer: COMMERCIAL

## 2017-12-18 PROCEDURE — 97018 PARAFFIN BATH THERAPY: CPT

## 2017-12-18 PROCEDURE — 97110 THERAPEUTIC EXERCISES: CPT

## 2017-12-19 ENCOUNTER — GENERIC CONVERSION - ENCOUNTER (OUTPATIENT)
Dept: FAMILY MEDICINE CLINIC | Facility: CLINIC | Age: 72
End: 2017-12-19

## 2017-12-20 ENCOUNTER — APPOINTMENT (OUTPATIENT)
Dept: OCCUPATIONAL THERAPY | Facility: HOME HEALTHCARE | Age: 72
End: 2017-12-20
Payer: COMMERCIAL

## 2017-12-20 PROCEDURE — 97110 THERAPEUTIC EXERCISES: CPT

## 2017-12-20 PROCEDURE — 97018 PARAFFIN BATH THERAPY: CPT

## 2017-12-26 ENCOUNTER — APPOINTMENT (OUTPATIENT)
Dept: OCCUPATIONAL THERAPY | Facility: HOME HEALTHCARE | Age: 72
End: 2017-12-26
Payer: COMMERCIAL

## 2017-12-26 PROCEDURE — 97018 PARAFFIN BATH THERAPY: CPT

## 2017-12-26 PROCEDURE — 97110 THERAPEUTIC EXERCISES: CPT

## 2017-12-28 ENCOUNTER — APPOINTMENT (OUTPATIENT)
Dept: OCCUPATIONAL THERAPY | Facility: HOME HEALTHCARE | Age: 72
End: 2017-12-28
Payer: COMMERCIAL

## 2017-12-28 PROCEDURE — 97018 PARAFFIN BATH THERAPY: CPT

## 2017-12-28 PROCEDURE — 97110 THERAPEUTIC EXERCISES: CPT

## 2017-12-29 ENCOUNTER — GENERIC CONVERSION - ENCOUNTER (OUTPATIENT)
Dept: OTHER | Facility: OTHER | Age: 72
End: 2017-12-29

## 2018-01-03 ENCOUNTER — ALLSCRIPTS OFFICE VISIT (OUTPATIENT)
Dept: OTHER | Facility: OTHER | Age: 73
End: 2018-01-03
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
## 2018-01-10 NOTE — PROGRESS NOTES
Assessment    1  Encounter for preventive health examination (V70 0) (Z00 00)   2  Bacterial pneumonia (482 9) (J15 9)   3  Fracture of rib, closed (807 00) (S22 39XA)    Plan  Bacterial pneumonia    · PredniSONE 20 MG Oral Tablet; TAKE 2 TABLETS DAILY WITH FOOD   · ProAir  (90 Base) MCG/ACT Inhalation Aerosol Solution; INHALE 2 PUFFS  EVERY 4 HOURS AS NEEDED   · Zithromax Z-Davidson 250 MG Oral Tablet (Azithromycin); TAKE AS DIRECTED   · Ipratropium-Albuterol 0 5-2 5 (3) MG/3ML Inhalation Solution   · MINI NEBULIZER- POC; Status:Complete;   Done: 90AQW7089 12:12PM  Depression, Dyslipidemia, Health Maintenance, Erectile dysfunction of non-organic  origin    · (1) CBC/PLT/DIFF; Status:Active; Requested HRS:43RHR5398;    · (1) COMPREHENSIVE METABOLIC PANEL; Status:Active; Requested RNP:22UVI2032;    · (1) LIPID PANEL, FASTING; Status:Active; Requested CGQ:37ZLX8162;   Fracture of rib, closed    · Hydrocodone-Acetaminophen 5-325 MG Oral Tablet; Take one pill q 4-6 hours  as  needed for severe pain  Use sparingly  Special screening examination for neoplasm of prostate    · (1) PSA (SCREEN) (Dx V76 44 Screen for Prostate Cancer); Status:Active; Requested  RGB:42MMB4805;     Discussion/Summary    We'll continue pain medicine as needed for right rib fractures  The patient instructed to make sure he is taking deep breaths on a regular basis to prevent atelectasis of the lung  We'll treat the patient's URI with antibiotics and steroids  The patient was given a Duoneb in the office with improvement  Impression: Subsequent Annual Wellness Visit, with preventive exam as well as age and risk appropriate counseling completed       Cardiovascular screening and counseling: the risks and benefits of screening were discussed, screening is current, counseling was given on maintaining a healthy diet, counseling was given on maintaining a healthy weight, counseling was given on ways to improve cholesterol, counseling was given on ways to improve blood pressure and counseling was given on ways to improve exercise tolerance  Diabetes screening and counseling: the risks and benefits of screening were discussed, screening is current, counseling was given on maintaining a healthy diet, counseling was given on maintaining a healthy weight and counseling was given on ways to improve physical activity  Colorectal cancer screening and counseling: the risks and benefits of screening were discussed, counseling was given on ways to eat a high fiber diet and due for a colonoscopy (low risk)  Prostate cancer screening and counseling: the risks and benefits of screening were discussed and screening is current  Osteoporosis screening and counseling: the risks and benefits of screening were discussed and screening is current  Glaucoma screening and counseling: the risks and benefits of screening were discussed and screening is current  HIV screening and counseling: screening not indicated  Immunizations: the risks and benefits of influenza vaccination were discussed with the patient, the patient declines the influenza vaccination, the risks and benefits of pneumococcal vaccination were discussed with the patient and the patient declines the pneumococcal vaccination  Advance Directive Planning: not complete, he was encouraged to follow-up with me to discuss his questions and/or decisions  History of Present Illness  HPI: The patient was recently seen in the emergency department after a fall  The patient did experience a right seventh and eighth rib fracture  The patient is applying ice and resting with improvement  He is using Vicodin for pain with relief of symptoms  He notes pain with deep breathing or movement  Welcome to [employer] and Wellness Visits: The patient is being seen for the subsequent annual wellness visit  Medicare Screening and Risk Factors   Hospitalizations: no previous hospitalizations    Medicare Screening Tests Risk Questions   Abdominal aortic aneurysm risk assessment: none indicated  Osteoporosis risk assessment: none indicated  HIV risk assessment: none indicated  Drug and Alcohol Use: The patient has never smoked cigarettes  The patient reports never drinking alcohol  He has never used illicit drugs  Diet and Physical Activity: Current diet includes well balanced meals  He is sedentary  Exercise: walking  Mood Disorder and Cognitive Impairment Screening:   Depression screening  negative for symptoms  He denies feeling down, depressed, or hopeless over the past two weeks  He denies feeling little interest or pleasure in doing things over the past two weeks  Cognitive impairment screening: denies difficulty learning/retaining new information, denies difficulty handling complex tasks, denies difficulty with reasoning, denies difficulty with spatial ability and orientation, denies difficulty with language and denies difficulty with behavior  Functional Ability/Level of Safety: Hearing is normal bilaterally  He denies hearing difficulties  The patient is currently able to do activities of daily living without limitations, able to do instrumental activities of daily living without limitations, able to participate in social activities without limitations and able to drive without limitations  Activities of daily living details: does not need help using the phone, no transportation help needed, does not need help shopping, no meal preparation help needed, does not need help doing housework, does not need help doing laundry, does not need help managing medications and does not need help managing money  Fall risk factors:   The patient fell 0 times in the past 12 months , no polypharmacy, no alcohol use, no mobility impairment, no antidepressant use, no deconditioning, no postural hypotension, no sedative use, no visual impairment, no urinary incontinence, no antihypertensive use, no cognitive impairment, up and go test was normal and no previous fall  Home safety risk factors:  no unfamiliar surroundings, no loose rugs, no poor household lighting, no uneven floors, no household clutter, grab bars in the bathroom and handrails on the stairs  Advance Directives: Advance directives: no living will, no durable power of  for health care directives and no advance directives  Co-Managers and Medical Equipment/Suppliers: See Patient Care Team      Patient Care Team    Care Team Member Role Specialty Office Number   Magdi Garcia   [phone]   Angel Card   [phone]     Review of Systems    Constitutional: negative  Eyes: negative  ENT: negative  Cardiovascular: negative  Respiratory: negative  Gastrointestinal: negative  Genitourinary: negative  Integumentary and Breasts: negative  Neurological: negative  Psychiatric: negative  Endocrine: negative  Hematologic and Lymphatic: negative  Active Problems    1  Depression (311) (F32 9)   2  Dyslipidemia (272 4) (E78 5)   3  Erectile dysfunction of non-organic origin (302 72) (F52 21)   4  Hypertension (401 9) (I10)   5  Malignant melanoma of skin (172 9) (C43 9)   6  Special screening examination for neoplasm of prostate (V76 44) (Z12 5)    Past Medical History    · History of Acute gouty arthropathy (274 01) (M10 00)   · History of Acute pharyngitis (462) (J02 9)    The active problems and past medical history were reviewed and updated today  Surgical History    · History of Shaving Of Lesion Mohs' Technique    The surgical history was reviewed and updated today  Family History  Mother    · Family history of malignant neoplasm (V16 9) (Z80 9)  Family History    · Family history of aortic aneurysm (V17 49) (Z82 49)    The family history was reviewed and updated today  Social History    · Being A Social Drinker   · Never A Smoker   · Occupation: Retired  The social history was reviewed and updated today   The social history was reviewed and is unchanged  Current Meds   1  Aspirin 81 MG Oral Tablet Delayed Release; Take 1 tablet daily Recorded   2  B Complex Vitamins CAPS Recorded   3  Losartan Potassium 50 MG Oral Tablet; TAKE ONE TABLET BY MOUTH EVERY DAY; Therapy: 29LKU8189 to (Evaluate:83Zim4532)  Requested for: 79AEX3227; Last   Rx:[id number] Ordered   4  Venlafaxine HCl ER 75 MG Oral Capsule Extended Release 24 Hour; Therapy: 45VZI0316 to (Last Rx:02Wpn3721)  Requested for: 16ZKK8522 Ordered   5  Viagra 100 MG Oral Tablet; TAKE 1 TABLET DAILY 1 HOUR BEFORE NEEDED; Therapy: 55QKL5590 to (Evaluate:93Mig2979)  Requested for: 05Fpb6018; Last   Rx:[id number] Ordered   6  Vitamin D 2000 UNIT Oral Capsule; TAKE 1 CAPSULE Daily Recorded    The medication list was reviewed and updated today  Allergies    1  No Known Drug Allergies    Immunizations   1    Influenza  Permanently Deferred: Pt refuses, 66CKZ3012     Vitals  Signs [Data Includes: Current Encounter]    O2 Saturation: 96   Heart Rate: 65  Systolic: 510, LUE, Sitting  Diastolic: 78, LUE, Sitting  Height: 5 ft 5 in  Weight: 153 lb 2 08 oz  BMI Calculated: 25 48  BSA Calculated: 1 77    Physical Exam    Constitutional   General appearance: No acute distress, well appearing and well nourished  Eyes   Conjunctiva and lids: No swelling, erythema, or discharge  Pupils and irises: Equal, round and reactive to light  Ears, Nose, Mouth, and Throat   External inspection of ears and nose: Normal     Otoscopic examination: Tympanic membrance translucent with normal light reflex  Canals patent without erythema  Oropharynx: Normal with no erythema, edema, exudate or lesions  Pulmonary   Respiratory effort: No increased work of breathing or signs of respiratory distress  Auscultation of lungs: Abnormal   Wheezing noted bilaterally, rhonchi noted in right lung     Cardiovascular   Auscultation of heart: Normal rate and rhythm, normal S1 and S2, without murmurs  Examination of extremities for edema and/or varicosities: Normal     Abdomen   Abdomen: Non-tender, no masses  Liver and spleen: No hepatomegaly or splenomegaly  Lymphatic   Palpation of lymph nodes in neck: No lymphadenopathy  Musculoskeletal   Gait and station: Normal     Digits and nails: Normal without clubbing or cyanosis  Inspection/palpation of joints, bones, and muscles: Abnormal   right sided lower chest wall tenderness  Skin   Skin and subcutaneous tissue: Normal without rashes or lesions  Neurologic   Cranial nerves: Cranial nerves 2-12 intact  Reflexes: 2+ and symmetric  Sensation: No sensory loss  Psychiatric   Orientation to person, place and time: Normal     Mood and affect: Normal        Results/Data  MINI NEBULIZER- POC 52WIM0742 12:12PM Chung Ballard     Test Name Result Flag Reference   MiniNebulizer          Procedure    Procedure: Visual Acuity Test    Results: normal in both eyes  The patient tolerated the procedure well  There were no complications        Signatures   Electronically signed by : Martínez Pacheco [facility]; Jun 23 2016  8:55AM EST                       (Author)    Electronically signed by : Veena Mackay DO; Jun 23 2016 11:56AM EST                       (Author)

## 2018-01-11 NOTE — RESULT NOTES
Verified Results  DIGITAL RECTAL EXAM [id number] 12:41PM Bria Arevalo     Test Name Result Flag Reference   DIGITAL RECTAL EXAM Normal examination         Plan  Depression, Screening for depression    · *VB - PHQ-9 Tool; Status:Complete;   Done: 15RXV0405 11:24AM  Encounter for colorectal cancer screening, Health Maintenance    · COLONOSCOPY; Status:Active; Requested for:68Lad2100;    · 2 - Grady Mayorga MD, [place]  Colorectal Surgery, Gastroenterology Physician Referral   Consult Only: the expectation is that the referring provider will communicate back to the  patient on treatment options  Evaluation and Treatment: the expectation is that the  referred to provider will communicate back to the patient on treatment options  Status:  Active  Requested for: 70Esl6982  Care Summary provided  : Yes  Health Maintenance    · DIGITAL RECTAL EXAM; Status:Complete;   Done: 33GAO0015 12:41PM  Hypertension    · Losartan Potassium 50 MG Oral Tablet   · DiltiaZEM HCl  MG Oral Capsule Extended Release 12 Hour; take 1  capsule daily  Medicare annual wellness visit, subsequent, Screening for genitourinary condition    · Medicare Annual Wellness Visit; Status:Complete;   Done: 20PKV7879 11:53AM  Screening for depression    · *VB-Depression Screening; Status:Complete;   Done: 20GVY8717 11:53AM  Screening for genitourinary condition    · *VB - Urinary Incontinence Screen (Dx Z13 89 Screen for UI);  Status:Complete;   Done:  27JQZ1311 11:53AM  Screening for neurological condition    · *VB - Fall Risk Assessment  (Dx Z13 89 Screen for Neurologic Disorder);  Status:Complete;   Done: 92OKO7739 11:52AM

## 2018-01-12 VITALS
DIASTOLIC BLOOD PRESSURE: 82 MMHG | SYSTOLIC BLOOD PRESSURE: 142 MMHG | WEIGHT: 150.6 LBS | BODY MASS INDEX: 25.09 KG/M2 | HEIGHT: 65 IN

## 2018-01-12 NOTE — RESULT NOTES
Verified Results  (1) COMPREHENSIVE METABOLIC PANEL 86GPQ7167 63:57XR Jose Connell Order Number: [id number]     Test Name Result Flag Reference   SODIUM 139 mmol/L  136-145   POTASSIUM 4 3 mmol/L  3 5-5 3   CHLORIDE 108 mmol/L  100-108   CARBON DIOXIDE 27 mmol/L  21-32   ANION GAP (CALC) 4 mmol/L  4-13   BLOOD UREA NITROGEN 22 mg/dL  5-25   CREATININE 0 82 mg/dL  0 60-1 30   Standardized to IDMS reference method   CALCIUM 9 0 mg/dL  8 3-10 1   BILI, TOTAL 0 49 mg/dL  0 20-1 00   ALK PHOSPHATAS 84 U/L     ALT (SGPT) 25 U/L  12-78   Specimen collection should occur prior to Sulfasalazine and/or Sulfapyridine administration due to the potential for falsely depressed results  AST(SGOT) 19 U/L  5-45   Specimen collection should occur prior to Sulfasalazine administration due to the potential for falsely depressed results  ALBUMIN 3 1 g/dL L 3 5-5 0   TOTAL PROTEIN 8 1 g/dL  6 4-8 2   eGFR 88 ml/min/1 73sq m     National Kidney Disease Education Program recommendations are as follows:  GFR calculation is accurate only with a steady state creatinine  Chronic Kidney disease less than 60 ml/min/1 73 sq  meters  Kidney failure less than 15 ml/min/1 73 sq  meters  GLUCOSE FASTING 81 mg/dL  65-99   Specimen collection should occur prior to Sulfasalazine administration due to the potential for falsely depressed results  Specimen collection should occur prior to Sulfapyridine administration due to the potential for falsely elevated results  (1) LIPID PANEL, FASTING 21Jmc1288 08:24AM Yola Villaseñor    Order Number: [id number]     Test Name Result Flag Reference   CHOLESTEROL 145 mg/dL     HDL,DIRECT 59 mg/dL  40-60   Specimen collection should occur prior to Metamizole administration due to the potential for falsley depressed results     LDL CHOLESTEROL CALCULATED 79 mg/dL  0-100   Triglyceride:        Normal ??? ??? ??? ??? ??? ??? ??? <150 mg/dl   ??? ??? ???Borderline High ??? ??? 150-199 mg/dl   ??? ??? ? ?? High ??? ??? ??? ??? ??? ??? ??? 200-499 mg/dl   ??? ??? ? ??Very High ??? ??? ??? ??? ??? >499 mg/dl      Cholesterol:       Desirable ??? ??? ??? ??? <200 mg/dl   ??? ??? Borderline High ??? 200-239 mg/dl   ??? ??? High ??? ??? ??? ??? ??? ??? >239 mg/dl      HDL Cholesterol:       High ??? ???>59 mg/dL   ??? ??? Low ??? ??? <41 mg/dL      This screening LDL is a calculated result  It does not have the accuracy of the Direct Measured LDL in the monitoring of patients with hyperlipidemia and/or statin therapy  Direct Measure LDL (IJG796) must be ordered separately in these patients  TRIGLYCERIDES 36 mg/dL  <=150   Specimen collection should occur prior to N-Acetylcysteine or Metamizole administration due to the potential for falsely depressed results  (1) PSA (SCREEN) (Dx V76 44 Screen for Prostate Cancer) 50Nfo2605 08:24AM Gavino Skonel Order Number: [id number]     Test Name Result Flag Reference   PROSTATE SPECIFIC ANTIGEN 2 0 ng/mL  0 0-4 0   American Urological Association Guidelines define biochemical recurrence of prostate cancer as a detectable or rising PSA value post-radical prostatectomy that is greater than or equal to 0 2 ng/mL with a second confirmatory level of greater than or equal to 0 2 ng/mL

## 2018-01-13 VITALS
DIASTOLIC BLOOD PRESSURE: 74 MMHG | HEIGHT: 64 IN | BODY MASS INDEX: 25.23 KG/M2 | WEIGHT: 147.8 LBS | SYSTOLIC BLOOD PRESSURE: 116 MMHG

## 2018-01-14 VITALS
WEIGHT: 147.81 LBS | BODY MASS INDEX: 25.24 KG/M2 | DIASTOLIC BLOOD PRESSURE: 89 MMHG | SYSTOLIC BLOOD PRESSURE: 158 MMHG | HEART RATE: 75 BPM | HEIGHT: 64 IN

## 2018-01-17 NOTE — RESULT NOTES
Verified Results  (1) CBC/PLT/DIFF 23Erc9213 08:24AM [place] Baig Order Number: [id number]     Test Name Result Flag Reference   WBC COUNT 5 16 Thousand/uL  4 31-10 16   RBC COUNT 4 94 Million/uL  3 88-5 62   HEMOGLOBIN 13 4 g/dL  12 0-17 0   HEMATOCRIT 41 6 %  36 5-49 3   MCV 84 fL  82-98   MCH 27 1 pg  26 8-34 3   MCHC 32 2 g/dL  31 4-37 4   RDW 14 0 %  11 6-15 1   MPV 8 3 fL L 8 9-12 7   PLATELET COUNT 594 Thousands/uL  149-390   NEUTROPHILS RELATIVE PERCENT 53 %  43-75   LYMPHOCYTES RELATIVE PERCENT 30 %  14-44   MONOCYTES RELATIVE PERCENT 11 %  4-12   EOSINOPHILS RELATIVE PERCENT 5 %  0-6   BASOPHILS RELATIVE PERCENT 1 %  0-1   NEUTROPHILS ABSOLUTE COUNT 2 75 Thousands/?L  1 85-7 62   LYMPHOCYTES ABSOLUTE COUNT 1 56 Thousands/?L  0 60-4 47   MONOCYTES ABSOLUTE COUNT 0 55 Thousand/?L  0 17-1 22   EOSINOPHILS ABSOLUTE COUNT 0 27 Thousand/?L  0 00-0 61   BASOPHILS ABSOLUTE COUNT 0 03 Thousands/?L  0 00-0 10     (1) COMPREHENSIVE METABOLIC PANEL 61THI4594 91:60LX [place] Baig Order Number: [id number]     Test Name Result Flag Reference   GLUCOSE,RANDM 83 mg/dL     If the patient is fasting, the ADA then defines impaired fasting glucose as > 100 mg/dL and diabetes as > or equal to 123 mg/dL     SODIUM 138 mmol/L  136-145   POTASSIUM 4 7 mmol/L  3 5-5 3   CHLORIDE 103 mmol/L  100-108   CARBON DIOXIDE 27 mmol/L  21-32   ANION GAP (CALC) 8 mmol/L  4-13   BLOOD UREA NITROGEN 20 mg/dL  5-25   CREATININE 0 94 mg/dL  0 60-1 30   Standardized to IDMS reference method   CALCIUM 8 6 mg/dL  8 3-10 1   BILI, TOTAL 0 50 mg/dL  0 20-1 00   ALK PHOSPHATAS 115 U/L     ALT (SGPT) 27 U/L  12-78   AST(SGOT) 19 U/L  5-45   ALBUMIN 3 3 g/dL L 3 5-5 0   TOTAL PROTEIN 7 3 g/dL  6 4-8 2   eGFR Non-African American      >60 0 ml/min/1 73sq [facility] Disease Education Program recommendations are as follows:  GFR calculation is accurate only with a steady state creatinine  Chronic Kidney disease less than 60 ml/min/1 73 sq  meters  Kidney failure less than 15 ml/min/1 73 sq  meters  (1) LIPID PANEL, FASTING 02Aug2016 08:24AM Machelle Erazo Order Number: [id number]     Test Name Result Flag Reference   CHOLESTEROL 198 mg/dL     HDL,DIRECT 61 mg/dL H 40-60   Specimen collection should occur prior to Metamizole administration due to the potential for falsely depressed results  LDL CHOLESTEROL CALCULATED 129 mg/dL H 0-100   Triglyceride:         Normal              <150 mg/dl       Borderline High    150-199 mg/dl       High               200-499 mg/dl       Very High          >499 mg/dl  Cholesterol:         Desirable        <200 mg/dl      Borderline High  200-239 mg/dl      High             >239 mg/dl  HDL Cholesterol:        High    >59 mg/dL      Low     <41 mg/dL  LDL CALCULATED:    This screening LDL is a calculated result  It does not have the accuracy of the Direct Measured LDL in the monitoring of patients with hyperlipidemia and/or statin therapy  Direct Measure LDL (JSL747) must be ordered separately in these patients  TRIGLYCERIDES 42 mg/dL  <=150   Specimen collection should occur prior to N-Acetylcysteine or Metamizole administration due to the potential for falsely depressed results       (1) PSA (SCREEN) (Dx V76 44 Screen for Prostate Cancer) 61Bco3723 08:24AM Machelle Erazo Order Number: [id number]     Test Name Result Flag Reference   PROSTATE SPECIFIC ANTIGEN 1 8 ng/mL  0 0-4 0

## 2018-01-18 NOTE — RESULT NOTES
Verified Results  * XR HAND 3+ VIEW RIGHT 58MPF8470 11:28AM Jeanine Rodriguez Order Number: [id number]     Test Name Result Flag Reference   XR HAND 3+ VW RIGHT (Report)     RIGHT HAND     INDICATION: Trauma to 1st digit  COMPARISON: None     VIEWS: 3     IMAGES: 3     For the purposes of institution wide universal language the following terms will apply: (thumb=1st digit/finger, index finger=2nd digit/finger, long finger=3rd digit/finger, ring=4th digit/finger and small finger=5th digit/finger)     FINDINGS:     There is no acute fracture or dislocation  Degenerative change noted especially across the 1st carpal metacarpal joint  No lytic or blastic lesions are seen  Soft tissues are unremarkable  IMPRESSION:     No acute osseous abnormality         Workstation performed: [id number]     Signed by:   Stephanie Godoy MD   10/7/17                               Plan  Hand pain    · Dexamethasone Sodium Phosphate 10 MG/ML Injection Solution   · * XR HAND 3+ VIEW RIGHT; Status:Complete - Retrospective By Protocol Authorization;    Done: 86KYE2172 11:28AM

## 2018-01-22 VITALS
BODY MASS INDEX: 24.72 KG/M2 | HEART RATE: 69 BPM | HEIGHT: 64 IN | DIASTOLIC BLOOD PRESSURE: 77 MMHG | SYSTOLIC BLOOD PRESSURE: 144 MMHG | WEIGHT: 144.81 LBS

## 2018-01-22 VITALS
HEART RATE: 81 BPM | BODY MASS INDEX: 24.32 KG/M2 | HEIGHT: 65 IN | SYSTOLIC BLOOD PRESSURE: 135 MMHG | DIASTOLIC BLOOD PRESSURE: 80 MMHG | WEIGHT: 146 LBS

## 2018-01-22 VITALS
DIASTOLIC BLOOD PRESSURE: 82 MMHG | BODY MASS INDEX: 25.27 KG/M2 | SYSTOLIC BLOOD PRESSURE: 134 MMHG | HEIGHT: 64 IN | HEART RATE: 71 BPM | WEIGHT: 148 LBS

## 2018-01-22 VITALS
DIASTOLIC BLOOD PRESSURE: 82 MMHG | BODY MASS INDEX: 24.16 KG/M2 | SYSTOLIC BLOOD PRESSURE: 134 MMHG | HEART RATE: 80 BPM | WEIGHT: 145 LBS | HEIGHT: 65 IN

## 2018-01-23 VITALS
SYSTOLIC BLOOD PRESSURE: 136 MMHG | DIASTOLIC BLOOD PRESSURE: 79 MMHG | WEIGHT: 143 LBS | RESPIRATION RATE: 18 BRPM | HEART RATE: 67 BPM | HEIGHT: 65 IN | BODY MASS INDEX: 23.82 KG/M2

## 2018-01-23 NOTE — PROGRESS NOTES
Assessment    1  Never a smoker   2  Screening for depression (V79 0) (Z13 89)   3  Screening for neurological condition (V80 09) (Z13 89)   4  Screening for genitourinary condition (V81 6) (Z13 89)   5  Medicare annual wellness visit, subsequent (V70 0) (Z00 00)   6  Advance directive on file (V49 89) (Z78 9)   7  Encounter for preventive health examination (V70 0) (Z00 00)   8  Encounter for colorectal cancer screening (V76 51) (Z12 11,Z12 12)    Plan  Depression, Screening for depression    · *VB - PHQ-9 Tool; Status:Complete - Retrospective By Protocol Authorization;   Done:  03UOI6409 11:24AM  Encounter for colorectal cancer screening, Health Maintenance    · COLONOSCOPY; Status:Active; Requested for:87Vbs2310;    · 2 - Jeremie Tapia MD, [place]  Colorectal Surgery, Gastroenterology Physician Referral   Consult Only: the expectation is that the referring provider will communicate back to the  patient on treatment options  Evaluation and Treatment: the expectation is that the  referred to provider will communicate back to the patient on treatment options  Status:  Hold For - Scheduling  Requested for: 91QPT8610  Care Summary provided  : Yes  Health Maintenance    · DIGITAL RECTAL EXAM; Status:Resulted - Requires Verification;   Done: 92LKY4932  12:41PM  Hypertension    · Losartan Potassium 50 MG Oral Tablet   · DiltiaZEM HCl  MG Oral Capsule Extended Release 12 Hour; take 1  capsule daily  Medicare annual wellness visit, subsequent, Screening for genitourinary condition    · Medicare Annual Wellness Visit; Status:Complete - Retrospective Authorization;   Done:  95DNZ9932 11:53AM  Screening for depression    · *VB-Depression Screening; Status:Complete - Retrospective Authorization;   Done:  19RQL6769 11:53AM  Screening for genitourinary condition    · *VB - Urinary Incontinence Screen (Dx Z13 89 Screen for UI);  Status:Complete -  Retrospective By Protocol Authorization;   Done: 50SMH8273 11:53AM  Screening for neurological condition    · *VB - Fall Risk Assessment  (Dx Z13 89 Screen for Neurologic Disorder);  Status:Complete - Retrospective By Protocol Authorization;   Done: 14IMB7113 11:52AM    Discussion/Summary  Impression: Subsequent Annual Wellness Visit  Cardiovascular screening and counseling: the risks and benefits of screening were discussed and screening is current  Diabetes screening and counseling: the risks and benefits of screening were discussed and screening is current  Colorectal cancer screening and counseling: the risks and benefits of screening were discussed  Prostate cancer screening and counseling: the risks and benefits of screening were discussed and screening is current  History of Present Illness  The patient is being seen for the subsequent annual wellness visit  Medicare Screening and Risk Factors   Hospitalizations: he has been previously hospitalizied and he has been hospitalized Pt had one hospitalization within the last year due to rib fracture times  Once per lifetime medicare screening tests: ECG and AAA screening US has not yet been done  Medicare Screening Tests Risk Questions   Abdominal aortic aneurysm risk assessment: over 72years of age, but none indicated  Osteoporosis risk assessment: , over 48years of age and family history of fracture(s)  HIV risk assessment: none indicated  Drug and Alcohol Use: The patient has never smoked cigarettes  He is not ready to quit using tobacco  The patient reports never drinking alcohol  He has never used illicit drugs  Diet and Physical Activity: Current diet includes well balanced meals, limited junk food and 3 4 cups of coffee per day  The patient does not exercise  Exercise: Pt is very active with industrial activities around him home, in the yard, as well as shoveling coal and ashes to heat the home     Mood Disorder and Cognitive Impairment Screening: He denies feeling down, depressed, or hopeless over the past two weeks  He denies feeling little interest or pleasure in doing things over the past two weeks  Cognitive impairment screening: denies difficulty learning/retaining new information, denies difficulty handling complex tasks, denies difficulty with reasoning, denies difficulty with spatial ability and orientation, denies difficulty with language and denies difficulty with behavior  Functional Ability/Level of Safety: Hearing is normal bilaterally and a hearing aid is not used  He denies hearing difficulties  The patient is currently able to do activities of daily living without limitations, able to do instrumental activities of daily living without limitations, able to participate in social activities without limitations and able to drive without limitations  Activities of daily living details: does not need help using the phone, no transportation help needed, does not need help shopping, no meal preparation help needed, does not need help doing housework, does not need help doing laundry and does not need help managing money  Fall risk factors:  antihypertensive use and previous fall, but The patient fell One fall times in the past 12 months , no polypharmacy, no alcohol use, no mobility impairment, no antidepressant use, no deconditioning, no postural hypotension, no sedative use, no visual impairment, no urinary incontinence, no cognitive impairment and up and go test was normal   Marvina Push off ladder and fractured some ribs  Home safety risk factors:  no grab bars in the bathroom, but no unfamiliar surroundings, no loose rugs, no poor household lighting, no uneven floors, no household clutter and handrails on the stairs  Advance Directives: Advance directives: living will, durable power of  for health care directives and advance directives  Co-Managers and Medical Equipment/Suppliers: See Patient Care Team   Falls Risk: The patient fell 1 times in the past 12 months   The patient is currently asymptomatic Symptoms Include: The patient currently has no urinary incontinence symptoms  Date of last glaucoma screen was Patient follows with Dr Ross Strange on a yearly basis      Patient Care Team    Care Team Member Role Specialty Office Number   Laurie Scanlon   [phone]   Pete Ponce   [phone]     Review of Systems    Constitutional: negative  Eyes: negative  ENT: negative  Cardiovascular: negative  Respiratory: negative  Gastrointestinal: negative  Genitourinary: negative  Musculoskeletal: negative  Integumentary and Breasts: negative  Neurological: negative  Psychiatric: negative  Endocrine: negative  Hematologic and Lymphatic: negative  Over the past 2 weeks, how often have you been bothered by the following problems? 1 ) Little interest or pleasure in doing things? Not at all    2 ) Feeling down, depressed or hopeless? Not at all    3 ) Trouble falling asleep or sleeping too much? Not at all    4 ) Feeling tired or having little energy? Not at all    5 ) Poor appetite or overeating? Not at all    6 ) Feeling bad about yourself, or that you are a failure, or have let yourself or your family down? Not at all    7 ) Trouble concentrating on things, such as reading a newspaper or watching television? Not at all    8 ) Moving or speaking so slowly that other people could have noticed, or the opposite, moving or speaking faster than usual? Not at all    9 ) Thoughts that you would be better off dead or of hurting yourself in some way? 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 01) (M10 9)   2  History of Acute pharyngitis (462) (J02 9)   3  History of Fracture of rib, closed (807 00) (S22 39XA)   4  History of bacterial pneumonia (V12 61) (Z87 01)    The active problems and past medical history were reviewed and updated today  Surgical History    1  History of Shaving Of Lesion Mohs' Technique    The surgical history was reviewed and updated today  Family History  Mother    1  Family history of malignant neoplasm (V16 9) (Z80 9)  Family History    2  Family history of aortic aneurysm (V17 49) (Z82 49)    The family history was reviewed and updated today  Social History    · Being A Social Drinker   · Never a smoker   · Occupation: Retired  The social history was reviewed and updated today  The social history was reviewed and is unchanged  Current Meds   1  Aspirin 81 MG Oral Tablet Delayed Release; Take 1 tablet daily Recorded   2  B Complex Vitamins CAPS Recorded   3  Losartan Potassium 50 MG Oral Tablet; TAKE ONE TABLET BY MOUTH EVERY DAY; Therapy: 27ORR9766 to (Evaluate:86Fqj3108)  Requested for: 28Rdp3757; Last   Rx:33Pgo0254 Ordered   4  Vitamin D 2000 UNIT Oral Capsule; TAKE 1 CAPSULE Daily Recorded    The medication list was reviewed and updated today  Allergies    1  No Known Drug Allergies    Immunizations  Influenza --- Rachel Camas: Permanently Deferred: Pt refuses, 90Sjt9926     Vitals  Signs   Recorded: 46ETX8462 26:38DN   Systolic: 024  Diastolic: 82  Height: 5 ft 5 in  Weight: 150 lb 9 6 oz  BMI Calculated: 25 06  BSA Calculated: 1 75    Results/Data  PHQ-2 Adult Depression Screening 29Nkq7174 11:54AM User, [employer]     Test Name Result Flag Reference   PHQ-2 Adult Depression Score 0     Over the last two weeks, how often have you been bothered by any of the following problems?   Little interest or pleasure in doing things: Not at all - 0  Feeling down, depressed, or hopeless: Not at all - 0   PHQ-2 Adult Depression Screening Negative       Falls Risk Assessment (Dx Z13 89 Screen for Neurologic Disorder) 92JRN4271 11:54AM User, [employer]     Test Name Result Flag Reference   Falls Risk      Falls with injury in the past year     *VB - Urinary Incontinence Screen (Dx Z13 89 Screen for UI) 14KLN7698 11:53AM [email or web address]     Test Name Result Flag Reference   Urinary Incontinence Assessment 17VNW0149       *VB-Depression Screening 24DRC6708 11:53AM [email or web address]     Test Name Result Flag Reference   Depression Scale Result      Depression Screen - Negative For Symptoms     Medicare Annual Wellness Visit 27VVR5726 11:53AM [employer]     Test Name Result Flag Reference   MEDICARE [place] VISIT 39NWY8930       *VB - Fall Risk Assessment  (Dx Z13 89 Screen for Neurologic Disorder) 51PFT2614 11:52AM [email or web address]     Test Name Result Flag Reference   Falls Risk      Falls with injury in the past year     *VB - PHQ-9 Tool 36THT4342 11:24AM [email or web address]     Test Name Result Flag Reference   PHQ-9 Adult Depression Score 1     PHQ-9 Adult Depression Screening Negative       PHQ-9 Adult Depression Screening 72Vmw4501 11:23AM User, [employer]     Test Name Result Flag Reference   PHQ-9 Adult Depression Score 1     Over the last two weeks, how often have you been bothered by any of the following problems? Little interest or pleasure in doing things: Not at all - 0  Feeling down, depressed, or hopeless: Not at all - 0  Trouble falling or staying asleep, or sleeping too much: Several days - 1  Feeling tired or having little energy: Not at all - 0  Poor appetite or over eating: Not at all - 0  Feeling bad about yourself - or that you are a failure or have let yourself or your family down: Not at all - 0  Trouble concentrating on things, such as reading the newspaper or watching television: Not at all - 0  Moving or speaking so slowly that other people could have noticed   Or the opposite -  being so fidgety or restless that you have been moving around a lot more than usual: Not at all - 0  Thoughts that you would be better off dead, or of hurting yourself in some way: Not at all - 0   PHQ-9 Adult Depression Screening Negative     PHQ-9 Difficulty Level Not difficult at all     PHQ-9 Severity Minimal Depression         Future Appointments    Date/Time Provider Specialty Site   06/28/2017 09:30 AM Arpita Reynolds DO Family Medicine [facility]     Signatures   Electronically signed by : Marie Vanessa DO; Feb 28 2017 12:44PM EST                       (Author)

## 2018-01-24 NOTE — PROGRESS NOTES
Assessment    1  Primary osteoarthritis of both shoulders (715 11) (M19 011,M19 012)    Plan  Ache in joint    · * XR SHOULDER 2+ VIEW LEFT; Status:Active - Retrospective By Protocol Authorization; Requested [id number];    · * XR SHOULDER 2+ VIEW RIGHT; Status:Active - Retrospective Authorization; Requested [id number];    · * XR SPINE CERVICAL COMPLETE 4 OR 5 VW NON INJURY; Status:Active -  Retrospective By Protocol Authorization; Requested [id number];   Primary osteoarthritis of both shoulders    · Betamethasone Sod Phos & Acet 6 (3-3) MG/ML Injection Suspension   · Follow-up visit in 3 months Evaluation and Treatment  Follow-up  Status: Hold For -  Scheduling  Requested for: [id number]   · *1 - [facility]  *  Status: Active  Requested  for: [id number]  Care Summary provided  : Yes    Discussion/Summary    Patient has a bilateral shoulder osteoarthritis  Left one is severe on radiographs  Right one is a moderate to severe on radiographs  Clinically only the right is symptomatic  Left is extremely stiff but has excellent strength  Right has good range of motion and no weakness  Discussed treatment with the patient  Right shoulder joint injected with steroid  Patient will start a physical therapy program  Follow-up in 3 months  Chief Complaint    1  Shoulder Problem  Bilateral shoulder pain      History of Present Illness  66-year-old retired right-handed gentleman  Patient comes in with bilateral shoulder pain for many years  Pain and stiffness gradually getting worse  She has had problems in the left shoulder for the last 6 years  Patient noticed increasing stiffness with the left shoulder  Minimal discomfort  Patient is noticed that he is unable to lift his left shoulder for the last many years  No history of trauma  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  Patient is right-handed      Patient comes in with a complaint of pain and discomfort in the right shoulder  This started 2 months ago  Gradually getting worse  It interferes with his night's sleep  Review of Systems    Constitutional: No fever or chills, feels well, no tiredness, no recent weight loss or weight gain  Eyes: No complaints of red eyes, no eyesight problems  ENT: no complaints of loss of hearing, no nosebleeds, no sore throat  Cardiovascular: No complaints of chest pain, no palpitations, no leg claudication or lower extremity edema  Respiratory: No complaints of shortness of breath, no wheezing, no cough  Gastrointestinal: No complaints of abdominal pain, no constipation, no nausea or vomiting, no diarrhea or bloody stools  Genitourinary: No complaints of dysuria or incontinence, no hesitancy, no nocturia  Musculoskeletal: as noted in HPI  Integumentary: No complaints of skin rash or lesion, no itching or dry skin, no skin wounds  Neurological: No complaints of headache, no confusion, no numbness or tingling, no dizziness  Psychiatric: No suicidal thoughts, no anxiety, no depression  Endocrine: No muscle weakness, no frequent urination, no excessive thirst, no feelings of weakness  Active Problems    1  Ache in joint (719 40) (M25 50)   2  Acute bronchitis (466 0) (J20 9)   3  Advance directive on file (V49 89) (Z78 9)   4  Depression (311) (F32 9)   5  Dyslipidemia (272 4) (E78 5)   6  Encounter for colorectal cancer screening (V76 51) (Z12 11,Z12 12)   7  Erectile dysfunction of non-organic origin (302 72) (F52 21)   8  Hypertension (401 9) (I10)   9  Malignant melanoma of skin (172 9) (C43 9)   10  Medicare annual wellness visit, subsequent (V70 0) (Z00 00)   11  Screening for depression (V79 0) (Z13 89)   12  Screening for genitourinary condition (V81 6) (Z13 89)   13   Screening for neurological condition (V80 09) (Z13 89)   14  Special screening examination for neoplasm of prostate (V76 44) (Z12 5)    Past Medical History    · History of Acute gouty arthropathy (274 01) (M10 9)   · History of Acute pharyngitis (462) (J02 9)   · History of Fracture of rib, closed (807 00) (S22 39XA)   · History of bacterial pneumonia (V12 61) (Z87 01)    Surgical History    · History of Shaving Of Lesion Mohs' Technique    Family History  Mother    · Family history of malignant neoplasm (V16 9) (Z80 9)  Family History    · Family history of aortic aneurysm (V17 49) (Z82 49)    Social History    · Advance directive on file (V49 89) (Z73 8)   · Being A Social Drinker   · Never a smoker   · Occupation: Retired    Current Meds   1  Aspirin 81 MG Oral Tablet Delayed Release; Take 1 tablet daily Recorded   2  B Complex Vitamins CAPS Recorded   3  Losartan Potassium 50 MG Oral Tablet; TAKE ONE TABLET BY MOUTH EVERY DAY; Therapy: 33JPK7625 to (Evaluate:38Cnf6936)  Requested for: 67YFI0184; Last   Rx:02Mar2017 Ordered   4  PredniSONE 50 MG Oral Tablet; TAKE 1 TABLET DAILY WITH FOOD; Therapy: 62IZR4244 to (Evaluate:08Apr2017)  Requested for: 03Apr2017; Last   Rx:03Apr2017 Ordered   5  ProAir  (90 Base) MCG/ACT Inhalation Aerosol Solution; INHALE 1 TO 2 PUFFS   EVERY 4 TO 6 HOURS AS NEEDED; Therapy: 81FUV5633 to (Last Rx:03Apr2017)  Requested for: 03Apr2017 Ordered   6  Vitamin D 2000 UNIT Oral Capsule; TAKE 1 CAPSULE Daily Recorded    Allergies    1  No Known Drug Allergies    Vitals   Recorded: 19UXB0049 08:36AM   Heart Rate 71   Systolic 979   Diastolic 82   Height 5 ft 4 in   Weight 148 lb    BMI Calculated 25 4   BSA Calculated 1 72     Physical Exam    ROM: Full except as noted: Forward flexion: restricted AROM 50 degrees  Abduction: restricted AROM 50 degrees  Internal rotation: restricted AROM  External rotation: normal AROM  Motor: Normal except as noted:   Special Tests: Negative except  Left Shoulder Appearance[de-identified] Normal except  Tenderness: None except the    Right Shoulder: Appearance: Normal except   Tenderness: None except the  ROM: Full except as noted: Motor: Normal except as noted:   Special Tests: Negative except  Constitutional - General appearance: Normal    Musculoskeletal - Gait and station: Normal  Digits and nails: Normal  Muscle strength/tone: Normal    Cardiovascular - Pulses: Normal  Examination of extremities for edema and/or varicosities: Normal    Skin - Skin and subcutaneous tissue: Normal    Neurologic - Sensation: Normal    Psychiatric - Orientation to person, place, and time: Normal  Mood and affect: Normal    Eyes   Conjunctiva and lids: Normal     Pupils and irises: Normal        Procedure    Procedure: of the right subacromial bursa and glenohumeral joint  Indication:  osteoarthritis  Risk, benefits and alternatives were discussed with the patient  Verbal consent was obtained prior to the procedure  Betadine was used to prep the area  ethyl chloride spray was used as a topical anesthetic  Using sterile technique, the aspiration/injection needle was then directed from a posterior aspect  A 22-gauge and spinal needle was used to inject 4cc of 1% Lidocaine and 1mL of 4 mg/mL dexamethasone  A bandage was applied  the patient tolerated the procedure well  Complications: none        Future Appointments    Date/Time Provider Specialty Site   06/28/2017 12:30 PM Nory Bland DO Family Medicine [address] [place] [address]     Signatures   Electronically signed by : GOLDY Barron ; Jun 16 2017  8:46AM EST                       (Author)

## 2018-02-07 ENCOUNTER — OFFICE VISIT (OUTPATIENT)
Dept: OBGYN CLINIC | Facility: CLINIC | Age: 73
End: 2018-02-07
Payer: MEDICARE

## 2018-02-07 VITALS
WEIGHT: 143 LBS | HEIGHT: 64 IN | SYSTOLIC BLOOD PRESSURE: 146 MMHG | DIASTOLIC BLOOD PRESSURE: 82 MMHG | HEART RATE: 73 BPM | BODY MASS INDEX: 24.41 KG/M2

## 2018-02-07 DIAGNOSIS — M19.011 PRIMARY OSTEOARTHRITIS OF RIGHT SHOULDER: Primary | ICD-10-CM

## 2018-02-07 DIAGNOSIS — M18.11 PRIMARY OSTEOARTHRITIS OF FIRST CARPOMETACARPAL JOINT OF RIGHT HAND: ICD-10-CM

## 2018-02-07 PROCEDURE — 99212 OFFICE O/P EST SF 10 MIN: CPT | Performed by: ORTHOPAEDIC SURGERY

## 2018-02-07 NOTE — PROGRESS NOTES
Assessment:       1  Primary osteoarthritis of right shoulder    2  Primary osteoarthritis of first carpometacarpal joint of right hand          Plan:    Patient allowed to weightbear as tolerated with no restrictions  Patient will follow up in 4 months x-ray right shoulder right thumb on arrival             Subjective:     Patient ID: Tirso Johnson is a 68 y o  male  Chief Complaint:  Right shoulder pain status post right shoulder arthroscopic debridement for arthritis and August 28, 2017  Right thumb [facility] joint treated by injection therapy and physical therapy    HPI  Patient doing well status post arthroscopic debridement of right shoulder release of adhesions  Patient has regained functional range of motion with occasional episodes of discomfort  Patient is able to do all activities of daily living and also do all the work in the house without any restrictions  Patient is significantly relief of right thumb pain with a steroid injection and is relatively asymptomatic     Social History     Occupational History    Not on file  Social History Main Topics    Smoking status: Never Smoker    Smokeless tobacco: Never Used    Alcohol use Yes      Comment: rare    Drug use: No    Sexual activity: Not on file      Review of Systems        Objective:     Ortho ExamPhysical Exam      Right shoulder range of motion forward flexion to 150° AB duction to 160° full internal rotation around 30° external rotation cuff strength 4/5 with some crepitus    Right thumb mild tenderness over the [facility] joint good range of motion    I have personally reviewed pertinent films in PACS

## 2018-04-11 ENCOUNTER — OFFICE VISIT (OUTPATIENT)
Dept: OBGYN CLINIC | Facility: CLINIC | Age: 73
End: 2018-04-11
Payer: COMMERCIAL

## 2018-04-11 VITALS
DIASTOLIC BLOOD PRESSURE: 92 MMHG | WEIGHT: 143 LBS | HEART RATE: 75 BPM | HEIGHT: 65 IN | BODY MASS INDEX: 23.82 KG/M2 | SYSTOLIC BLOOD PRESSURE: 162 MMHG

## 2018-04-11 DIAGNOSIS — M18.11 ARTHRITIS OF CARPOMETACARPAL (CMC) JOINT OF RIGHT THUMB: Primary | ICD-10-CM

## 2018-04-11 PROCEDURE — 99212 OFFICE O/P EST SF 10 MIN: CPT | Performed by: ORTHOPAEDIC SURGERY
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
## 2018-06-08 ENCOUNTER — OFFICE VISIT (OUTPATIENT)
Dept: OBGYN CLINIC | Facility: CLINIC | Age: 73
End: 2018-06-08
Payer: MEDICARE

## 2018-06-08 ENCOUNTER — APPOINTMENT (OUTPATIENT)
Dept: RADIOLOGY | Facility: MEDICAL CENTER | Age: 73
End: 2018-06-08
Payer: MEDICARE

## 2018-06-08 VITALS
HEART RATE: 64 BPM | HEIGHT: 65 IN | WEIGHT: 141.4 LBS | RESPIRATION RATE: 16 BRPM | BODY MASS INDEX: 23.56 KG/M2 | SYSTOLIC BLOOD PRESSURE: 139 MMHG | DIASTOLIC BLOOD PRESSURE: 80 MMHG

## 2018-06-08 DIAGNOSIS — M19.011 PRIMARY OSTEOARTHRITIS OF RIGHT SHOULDER: Primary | ICD-10-CM

## 2018-06-08 PROCEDURE — 73030 X-RAY EXAM OF SHOULDER: CPT

## 2018-06-08 PROCEDURE — 99213 OFFICE O/P EST LOW 20 MIN: CPT | Performed by: ORTHOPAEDIC SURGERY

## 2018-06-08 NOTE — PROGRESS NOTES
Chief Complaint  Right shoulder arthroscopic release for arthritis and capsular adhesions    History Of Presenting Illness  Ema Parry 1945 presents with patient underwent arthroscopic release and right shoulder August 26, 2017  Patient pleased with the result of surgery  Patient has low only little discomfort in the shoulder  Patient has regained excellent range of motion and is now able to do all activities of daily living without any problem      Current Medications  Current Outpatient Prescriptions   Medication Sig Dispense Refill    aspirin 81 MG tablet Take 1 tablet by mouth daily      cyanocobalamin (VITAMIN B-12) 1,000 mcg tablet Take 1,000 mcg by mouth daily   losartan (COZAAR) 50 mg tablet Take 50 mg by mouth daily   VITAMIN D, CHOLECALCIFEROL, PO Take 2,000 Units by mouth daily  No current facility-administered medications for this visit          Current Problems    Active Problems:   Patient Active Problem List    Diagnosis Date Noted    Rotator cuff tear arthropathy of right shoulder 08/28/2017         Review of Systems:    General: negative for - chills, fatigue, fever,  weight gain or weight loss  Rest of review of systems unchanged from before    Past Medical History:   Past Medical History:   Diagnosis Date    Arthritis     Depression     Gout     Hyperlipidemia     Hypertension     Melanoma ([address] )        Past Surgical History:   Past Surgical History:   Procedure Laterality Date    EYE SURGERY      MALIGNANT SKIN LESION EXCISION      [place] SHLDR ARTHROSCOP,LYSE ADHESNS Right 8/28/2017    Procedure: ARTHROSCOPY [facility] UNDER GENERAL ANESTHESIA ,LYSIS OF ADHESIONS, CAPSULECTOMY;  Surgeon: Paulette Linares MD;  Location: [facility];  Service: Orthopedics       Family History:  Family history reviewed and non-contributory  Family History   Problem Relation Age of Onset    Cancer Mother     Aneurysm Father     No Known Problems Sister        Social History:  Social History     Social History    Marital status: /Civil Union     Spouse name: N/A    Number of children: N/A    Years of education: N/A     Social History Main Topics    Smoking status: Never Smoker    Smokeless tobacco: Never Used    Alcohol use Yes      Comment: rare    Drug use: No    Sexual activity: Yes     Other Topics Concern    None     Social History Narrative    None       Allergies:   No Known Allergies        Physical ExaminationBP 139/80 (BP Location: Left arm, Patient Position: Sitting, Cuff Size: Standard)   Pulse 64   Resp 16   Ht 5' 5" (1 651 m)   Wt 64 1 kg (141 lb 6 4 oz)   BMI 23 53 kg/m²   Gen: Alert and oriented to person, place, time  HEENT: EOMI, eyes clear, moist mucus membranes, hearing intact  Respiratory: Bilateral chest rise  No audible wheezing found  Cardiovascular: Regular Rate and Rhythm  Abdomen: soft nontender/nondistended    Orthopedic Exam  Right shoulder all portal sites well healed  Follow-up flexion 160°  Reduction 150°  Patient able to reach the nape of his neck in the small of his back  Cuff strength is 4+ out of 5          Impression  Patient stable and improved status post arthroscopy right shoulder      Radiographs show arthritis right shoulder moderate to severe    Plan    Discussed treatment with the patient  Patient allowed all activities as tolerated  Shoulder will require further intervention only if it becomes symptomatic  Follow-up in 6 months      Sekou Parker MD        Portions of the record may have been created with voice recognition software   Occasional wrong word or "sound a like" substitutions may have occurred due to the inherent limitations of voice recognition software   Read the chart carefully and recognize, using context, where substitutions have occurred

## 2018-08-22 DIAGNOSIS — I10 ESSENTIAL HYPERTENSION: Primary | ICD-10-CM

## 2018-08-22 RX ORDER — LOSARTAN POTASSIUM 50 MG/1
50 TABLET ORAL DAILY
Qty: 90 TABLET | Refills: 0 | Status: SHIPPED | OUTPATIENT
Start: 2018-08-22 | End: 2018-08-29 | Stop reason: SDUPTHER

## 2018-08-29 ENCOUNTER — OFFICE VISIT (OUTPATIENT)
Dept: FAMILY MEDICINE CLINIC | Facility: CLINIC | Age: 73
End: 2018-08-29
Payer: MEDICARE

## 2018-08-29 VITALS
HEIGHT: 65 IN | BODY MASS INDEX: 23.93 KG/M2 | WEIGHT: 143.6 LBS | SYSTOLIC BLOOD PRESSURE: 124 MMHG | DIASTOLIC BLOOD PRESSURE: 74 MMHG

## 2018-08-29 DIAGNOSIS — Z23 NEED FOR VACCINATION: ICD-10-CM

## 2018-08-29 DIAGNOSIS — Z00.00 MEDICARE ANNUAL WELLNESS VISIT, SUBSEQUENT: Primary | ICD-10-CM

## 2018-08-29 DIAGNOSIS — I10 ESSENTIAL HYPERTENSION: ICD-10-CM

## 2018-08-29 DIAGNOSIS — Z12.5 SCREENING FOR PROSTATE CANCER: ICD-10-CM

## 2018-08-29 DIAGNOSIS — Z12.11 SCREENING FOR COLON CANCER: ICD-10-CM

## 2018-08-29 DIAGNOSIS — E78.5 DYSLIPIDEMIA: ICD-10-CM

## 2018-08-29 PROBLEM — M19.012 PRIMARY OSTEOARTHRITIS OF BOTH SHOULDERS: Status: ACTIVE | Noted: 2017-06-16

## 2018-08-29 PROBLEM — M19.90 ARTHRITIS: Status: ACTIVE | Noted: 2017-10-07

## 2018-08-29 PROBLEM — M18.11 PRIMARY OSTEOARTHRITIS OF FIRST CARPOMETACARPAL JOINT OF RIGHT HAND: Status: ACTIVE | Noted: 2017-11-08

## 2018-08-29 PROBLEM — M19.011 PRIMARY OSTEOARTHRITIS OF BOTH SHOULDERS: Status: ACTIVE | Noted: 2017-06-16

## 2018-08-29 PROCEDURE — G0439 PPPS, SUBSEQ VISIT: HCPCS | Performed by: PHYSICIAN ASSISTANT

## 2018-08-29 PROCEDURE — G0009 ADMIN PNEUMOCOCCAL VACCINE: HCPCS | Performed by: PHYSICIAN ASSISTANT

## 2018-08-29 PROCEDURE — 90670 PCV13 VACCINE IM: CPT | Performed by: PHYSICIAN ASSISTANT

## 2018-08-29 RX ORDER — LOSARTAN POTASSIUM 50 MG/1
50 TABLET ORAL DAILY
Qty: 90 TABLET | Refills: 1 | Status: SHIPPED | OUTPATIENT
Start: 2018-08-29 | End: 2019-04-10

## 2018-11-12 ENCOUNTER — APPOINTMENT (OUTPATIENT)
Dept: LAB | Facility: MEDICAL CENTER | Age: 73
End: 2018-11-12
Payer: MEDICARE

## 2018-11-12 ENCOUNTER — TRANSCRIBE ORDERS (OUTPATIENT)
Dept: RADIOLOGY | Facility: MEDICAL CENTER | Age: 73
End: 2018-11-12

## 2018-11-12 DIAGNOSIS — E78.5 DYSLIPIDEMIA: ICD-10-CM

## 2018-11-12 DIAGNOSIS — I10 ESSENTIAL HYPERTENSION: ICD-10-CM

## 2018-11-12 DIAGNOSIS — Z12.5 SCREENING FOR PROSTATE CANCER: ICD-10-CM

## 2018-11-12 LAB
ALBUMIN SERPL BCP-MCNC: 3.5 G/DL (ref 3.5–5)
ALP SERPL-CCNC: 84 U/L (ref 46–116)
ALT SERPL W P-5'-P-CCNC: 24 U/L (ref 12–78)
ANION GAP SERPL CALCULATED.3IONS-SCNC: 3 MMOL/L (ref 4–13)
AST SERPL W P-5'-P-CCNC: 14 U/L (ref 5–45)
BILIRUB SERPL-MCNC: 0.77 MG/DL (ref 0.2–1)
BUN SERPL-MCNC: 28 MG/DL (ref 5–25)
CALCIUM SERPL-MCNC: 8.6 MG/DL (ref 8.3–10.1)
CHLORIDE SERPL-SCNC: 105 MMOL/L (ref 100–108)
CHOLEST SERPL-MCNC: 160 MG/DL (ref 50–200)
CO2 SERPL-SCNC: 29 MMOL/L (ref 21–32)
CREAT SERPL-MCNC: 1.08 MG/DL (ref 0.6–1.3)
ERYTHROCYTE [DISTWIDTH] IN BLOOD BY AUTOMATED COUNT: 13.5 % (ref 11.6–15.1)
GFR SERPL CREATININE-BSD FRML MDRD: 68 ML/MIN/1.73SQ M
GLUCOSE P FAST SERPL-MCNC: 84 MG/DL (ref 65–99)
HCT VFR BLD AUTO: 43 % (ref 36.5–49.3)
HDLC SERPL-MCNC: 63 MG/DL (ref 40–60)
HGB BLD-MCNC: 13.2 G/DL (ref 12–17)
LDLC SERPL CALC-MCNC: 86 MG/DL (ref 0–100)
MCH RBC QN AUTO: 27.6 PG (ref 26.8–34.3)
MCHC RBC AUTO-ENTMCNC: 30.7 G/DL (ref 31.4–37.4)
MCV RBC AUTO: 90 FL (ref 82–98)
PLATELET # BLD AUTO: 260 THOUSANDS/UL (ref 149–390)
PMV BLD AUTO: 8.3 FL (ref 8.9–12.7)
POTASSIUM SERPL-SCNC: 4.1 MMOL/L (ref 3.5–5.3)
PROT SERPL-MCNC: 8.3 G/DL (ref 6.4–8.2)
PSA SERPL-MCNC: 1.6 NG/ML (ref 0–4)
RBC # BLD AUTO: 4.78 MILLION/UL (ref 3.88–5.62)
SODIUM SERPL-SCNC: 137 MMOL/L (ref 136–145)
TRIGL SERPL-MCNC: 53 MG/DL
WBC # BLD AUTO: 5.5 THOUSAND/UL (ref 4.31–10.16)

## 2018-11-12 PROCEDURE — 80061 LIPID PANEL: CPT

## 2018-11-12 PROCEDURE — 85027 COMPLETE CBC AUTOMATED: CPT

## 2018-11-12 PROCEDURE — 80053 COMPREHEN METABOLIC PANEL: CPT

## 2018-11-12 PROCEDURE — G0103 PSA SCREENING: HCPCS

## 2018-11-12 PROCEDURE — 36415 COLL VENOUS BLD VENIPUNCTURE: CPT

## 2018-12-06 ENCOUNTER — TELEPHONE (OUTPATIENT)
Dept: OBGYN CLINIC | Facility: HOSPITAL | Age: 73
End: 2018-12-06

## 2018-12-06 NOTE — TELEPHONE ENCOUNTER
Miri Parikh  [phone]    Dr Fabian Tomas    Patient called to advise his appt is for is shoulder w/Medicare

## 2018-12-07 ENCOUNTER — OFFICE VISIT (OUTPATIENT)
Dept: OBGYN CLINIC | Facility: CLINIC | Age: 73
End: 2018-12-07
Payer: MEDICARE

## 2018-12-07 VITALS
DIASTOLIC BLOOD PRESSURE: 75 MMHG | HEART RATE: 80 BPM | HEIGHT: 65 IN | SYSTOLIC BLOOD PRESSURE: 126 MMHG | BODY MASS INDEX: 23.66 KG/M2 | WEIGHT: 142 LBS

## 2018-12-07 DIAGNOSIS — M19.011 PRIMARY OSTEOARTHRITIS OF RIGHT SHOULDER: Primary | ICD-10-CM

## 2018-12-07 DIAGNOSIS — Z98.890 STATUS POST ARTHROSCOPY OF RIGHT SHOULDER: ICD-10-CM

## 2018-12-07 PROCEDURE — 99213 OFFICE O/P EST LOW 20 MIN: CPT | Performed by: ORTHOPAEDIC SURGERY

## 2018-12-07 PROCEDURE — 20610 DRAIN/INJ JOINT/BURSA W/O US: CPT | Performed by: ORTHOPAEDIC SURGERY

## 2018-12-07 RX ORDER — BETAMETHASONE SODIUM PHOSPHATE AND BETAMETHASONE ACETATE 3; 3 MG/ML; MG/ML
12 INJECTION, SUSPENSION INTRA-ARTICULAR; INTRALESIONAL; INTRAMUSCULAR; SOFT TISSUE
Status: COMPLETED | OUTPATIENT
Start: 2018-12-07 | End: 2018-12-07

## 2018-12-07 RX ORDER — BUPIVACAINE HYDROCHLORIDE 5 MG/ML
6 INJECTION, SOLUTION EPIDURAL; INTRACAUDAL
Status: COMPLETED | OUTPATIENT
Start: 2018-12-07 | End: 2018-12-07

## 2018-12-07 RX ADMIN — BUPIVACAINE HYDROCHLORIDE 6 ML: 5 INJECTION, SOLUTION EPIDURAL; INTRACAUDAL at 09:03

## 2018-12-07 RX ADMIN — BETAMETHASONE SODIUM PHOSPHATE AND BETAMETHASONE ACETATE 12 MG: 3; 3 INJECTION, SUSPENSION INTRA-ARTICULAR; INTRALESIONAL; INTRAMUSCULAR; SOFT TISSUE at 09:03

## 2019-04-09 ENCOUNTER — OFFICE VISIT (OUTPATIENT)
Dept: OBGYN CLINIC | Facility: CLINIC | Age: 74
End: 2019-04-09
Payer: MEDICARE

## 2019-04-09 ENCOUNTER — APPOINTMENT (OUTPATIENT)
Dept: RADIOLOGY | Facility: MEDICAL CENTER | Age: 74
End: 2019-04-09
Payer: MEDICARE

## 2019-04-09 VITALS
HEART RATE: 67 BPM | BODY MASS INDEX: 23.66 KG/M2 | HEIGHT: 65 IN | SYSTOLIC BLOOD PRESSURE: 142 MMHG | DIASTOLIC BLOOD PRESSURE: 86 MMHG | WEIGHT: 142 LBS

## 2019-04-09 DIAGNOSIS — M19.011 PRIMARY OSTEOARTHRITIS OF RIGHT SHOULDER: Primary | ICD-10-CM

## 2019-04-09 DIAGNOSIS — Z98.890 STATUS POST ARTHROSCOPY OF RIGHT SHOULDER: ICD-10-CM

## 2019-04-09 PROCEDURE — 73030 X-RAY EXAM OF SHOULDER: CPT

## 2019-04-09 PROCEDURE — 20610 DRAIN/INJ JOINT/BURSA W/O US: CPT | Performed by: PHYSICIAN ASSISTANT

## 2019-04-09 PROCEDURE — 99212 OFFICE O/P EST SF 10 MIN: CPT | Performed by: PHYSICIAN ASSISTANT

## 2019-04-09 RX ORDER — LIDOCAINE HYDROCHLORIDE 10 MG/ML
2 INJECTION, SOLUTION INFILTRATION; PERINEURAL
Status: COMPLETED | OUTPATIENT
Start: 2019-04-09 | End: 2019-04-09

## 2019-04-09 RX ORDER — TRIAMCINOLONE ACETONIDE 40 MG/ML
40 INJECTION, SUSPENSION INTRA-ARTICULAR; INTRAMUSCULAR
Status: COMPLETED | OUTPATIENT
Start: 2019-04-09 | End: 2019-04-09

## 2019-04-09 RX ADMIN — LIDOCAINE HYDROCHLORIDE 2 ML: 10 INJECTION, SOLUTION INFILTRATION; PERINEURAL at 09:39

## 2019-04-09 RX ADMIN — TRIAMCINOLONE ACETONIDE 40 MG: 40 INJECTION, SUSPENSION INTRA-ARTICULAR; INTRAMUSCULAR at 09:39

## 2019-04-10 ENCOUNTER — OFFICE VISIT (OUTPATIENT)
Dept: FAMILY MEDICINE CLINIC | Facility: CLINIC | Age: 74
End: 2019-04-10
Payer: MEDICARE

## 2019-04-10 VITALS
HEIGHT: 65 IN | WEIGHT: 143 LBS | SYSTOLIC BLOOD PRESSURE: 144 MMHG | BODY MASS INDEX: 23.82 KG/M2 | DIASTOLIC BLOOD PRESSURE: 84 MMHG

## 2019-04-10 DIAGNOSIS — R07.89 CHEST PRESSURE: ICD-10-CM

## 2019-04-10 DIAGNOSIS — I10 ESSENTIAL HYPERTENSION: Primary | ICD-10-CM

## 2019-04-10 DIAGNOSIS — Z12.11 SCREENING FOR COLON CANCER: ICD-10-CM

## 2019-04-10 PROCEDURE — 99214 OFFICE O/P EST MOD 30 MIN: CPT | Performed by: PHYSICIAN ASSISTANT

## 2019-04-10 RX ORDER — LOSARTAN POTASSIUM 100 MG/1
100 TABLET ORAL DAILY
Qty: 90 TABLET | Refills: 0 | Status: SHIPPED | OUTPATIENT
Start: 2019-04-10 | End: 2019-07-26 | Stop reason: SDUPTHER

## 2019-04-16 ENCOUNTER — HOSPITAL ENCOUNTER (OUTPATIENT)
Dept: NON INVASIVE DIAGNOSTICS | Facility: HOSPITAL | Age: 74
Discharge: HOME | End: 2019-04-16
Payer: MEDICARE

## 2019-04-16 DIAGNOSIS — I10 ESSENTIAL HYPERTENSION: ICD-10-CM

## 2019-04-16 DIAGNOSIS — R07.89 CHEST PRESSURE: ICD-10-CM

## 2019-04-16 PROCEDURE — 93018 CV STRESS TEST I&R ONLY: CPT | Performed by: INTERNAL MEDICINE

## 2019-04-16 PROCEDURE — 93017 CV STRESS TEST TRACING ONLY: CPT

## 2019-04-16 PROCEDURE — 93016 CV STRESS TEST SUPVJ ONLY: CPT | Performed by: INTERNAL MEDICINE

## 2019-04-19 LAB
CHEST PAIN STATEMENT: NORMAL
MAX DIASTOLIC BP: 88 MMHG
MAX HEART RATE: 134 BPM
MAX PREDICTED HEART RATE: 146 BPM
MAX. SYSTOLIC BP: 160 MMHG
PROTOCOL NAME: NORMAL
REASON FOR TERMINATION: NORMAL
TARGET HR FORMULA: NORMAL
TEST INDICATION: NORMAL
TIME IN EXERCISE PHASE: NORMAL

## 2019-05-07 ENCOUNTER — OFFICE VISIT (OUTPATIENT)
Dept: FAMILY MEDICINE CLINIC | Facility: CLINIC | Age: 74
End: 2019-05-07
Payer: MEDICARE

## 2019-05-07 VITALS
HEIGHT: 65 IN | SYSTOLIC BLOOD PRESSURE: 128 MMHG | WEIGHT: 141.4 LBS | DIASTOLIC BLOOD PRESSURE: 78 MMHG | BODY MASS INDEX: 23.56 KG/M2

## 2019-05-07 DIAGNOSIS — I10 ESSENTIAL HYPERTENSION: Primary | ICD-10-CM

## 2019-05-07 DIAGNOSIS — Z12.11 SCREENING FOR COLON CANCER: ICD-10-CM

## 2019-05-07 PROCEDURE — 99213 OFFICE O/P EST LOW 20 MIN: CPT | Performed by: PHYSICIAN ASSISTANT

## 2019-07-26 DIAGNOSIS — I10 ESSENTIAL HYPERTENSION: ICD-10-CM

## 2019-07-26 RX ORDER — LOSARTAN POTASSIUM 100 MG/1
100 TABLET ORAL DAILY
Qty: 90 TABLET | Refills: 0 | Status: SHIPPED | OUTPATIENT
Start: 2019-07-26 | End: 2019-07-29 | Stop reason: SDUPTHER

## 2019-07-29 DIAGNOSIS — I10 ESSENTIAL HYPERTENSION: ICD-10-CM

## 2019-07-29 RX ORDER — LOSARTAN POTASSIUM 100 MG/1
TABLET ORAL
Qty: 90 TABLET | Refills: 0 | Status: SHIPPED | OUTPATIENT
Start: 2019-07-29 | End: 2019-12-11 | Stop reason: SDUPTHER

## 2019-08-06 ENCOUNTER — OFFICE VISIT (OUTPATIENT)
Dept: OBGYN CLINIC | Facility: CLINIC | Age: 74
End: 2019-08-06
Payer: MEDICARE

## 2019-08-06 VITALS
BODY MASS INDEX: 23.99 KG/M2 | HEART RATE: 66 BPM | HEIGHT: 65 IN | DIASTOLIC BLOOD PRESSURE: 85 MMHG | WEIGHT: 144 LBS | SYSTOLIC BLOOD PRESSURE: 144 MMHG

## 2019-08-06 DIAGNOSIS — Z98.890 STATUS POST ARTHROSCOPY OF RIGHT SHOULDER: ICD-10-CM

## 2019-08-06 DIAGNOSIS — M19.011 PRIMARY OSTEOARTHRITIS OF RIGHT SHOULDER: Primary | ICD-10-CM

## 2019-08-06 PROCEDURE — 99212 OFFICE O/P EST SF 10 MIN: CPT | Performed by: ORTHOPAEDIC SURGERY

## 2019-08-06 NOTE — PROGRESS NOTES
74 y o male presents for follow-up of his right shoulder  He is status post shoulder arthroscopy with lysis of adhesions 08/28/2017  He has a history of right shoulder arthritis  He was last seen 04/09/2019 and underwent cortisone injection  He notes that his shoulder is feeling very good and is pleased with his outcome  He notes there is decreased clicking and he has not had any significant pain in the shoulder  He also notes that he has slightly decreased range of motion in the right shoulder is range of motion is right shoulder is better than his left  Review of Systems  Review of systems negative unless otherwise specified in HPI    Past Medical History  Past Medical History:   Diagnosis Date    Acute gouty arthropathy     Last Assessed: 6/9/2015    Arthritis     Bacterial pneumonia     Last Assessed: 6/21/2016    Depression     Fracture of rib, closed     Last Assessed: 6/21/2016    Gout     Hyperlipidemia     Hypertension     Melanoma ([address] )        Past Surgical History  Past Surgical History:   Procedure Laterality Date    EYE SURGERY      MALIGNANT SKIN LESION EXCISION      MOHS SURGERY      shaving of lesion moh's technique     [facility] SHLDR ARTHROSCOP,LYSE ADHESNS Right 8/28/2017    Procedure: ARTHROSCOPY  UNDER GENERAL ANESTHESIA ,LYSIS OF ADHESIONS, CAPSULECTOMY;  Surgeon: Deborah Hilton MD;  Location: [facility];  Service: Orthopedics     Current Medications  Current Outpatient Medications on File Prior to Visit   Medication Sig Dispense Refill    cyanocobalamin (VITAMIN B-12) 1,000 mcg tablet Take 1,000 mcg by mouth daily   losartan (COZAAR) 100 MG tablet TAKE 1 TABLET EVERY DAY (NEED MD APPOINTMENT) 90 tablet 0    Misc Natural Products (OSTEO BI-FLEX ADV TRIPLE ST PO) Take by mouth      VITAMIN D, CHOLECALCIFEROL, PO Take 2,000 Units by mouth daily  No current facility-administered medications on file prior to visit        Recent Labs (HCT,HGB,PT,INR,ESR,CRP,GLU,HgA1C)  0   Lab Value Date/Time    HCT 43 0 11/12/2018 0845    HCT 45 4 10/31/2014 0906    HGB 13 2 11/12/2018 0845    HGB 14 4 10/31/2014 0906    WBC 5 50 11/12/2018 0845    WBC 5 93 10/31/2014 0906    INR 1 00 08/23/2017 0824    GLUCOSE 84 10/31/2014 0906     Physical exam  · General: Awake, Alert, Oriented  · Eyes: Pupils equal, round and reactive to light  · Heart: regular rate and rhythm  · Lungs: No audible wheezing  · Abdomen: soft  right Shoulder  · Forward flexion 145°, abduction 80°, internal rotation behind back level of L 1 and symmetric bilaterally  Bilateral strength of the shoulder with resisted external rotation and supraspinatus empty can testing are symmetric without gross weakness  He is grossly neurovascular intact  No pain with resisted elbow flexion or extension  Procedure  Right shoulder manipulation with lysis of adhesions, capsulotomy on 08/28/2017 for arthritis  Imaging  None  1  Primary osteoarthritis of right shoulder    2  Status post arthroscopy of right shoulder      Assessment:  right shoulder arthritis status post arthroscopic release with patient doing well and pleased with his outcome  Plan: We will plan see the patient back in 3 months for follow-up evaluation  If he continues to do well he will call and cancel that appointment  If he is having increasing pain prior to that visit he will call and schedule a appointment sooner  He will continue to use his arm  May ice for any discomfort and may use Tylenol as needed  Try to limit cortisone injections to 1 or 2 per year do either side effects

## 2019-08-06 NOTE — PATIENT INSTRUCTIONS
We will plan see the patient back in 3 months for follow-up evaluation  If he continues to do well he will call and cancel that appointment  If he is having increasing pain prior to that visit he will call and schedule a appointment sooner  He will continue to use his arm  May ice for any discomfort and may use Tylenol as needed  Try to limit cortisone injections to 1 or 2 per year do either side effects

## 2019-08-07 ENCOUNTER — CONSULT (OUTPATIENT)
Dept: GASTROENTEROLOGY | Facility: HOSPITAL | Age: 74
End: 2019-08-07
Payer: MEDICARE

## 2019-08-07 VITALS
HEIGHT: 64 IN | HEART RATE: 70 BPM | DIASTOLIC BLOOD PRESSURE: 74 MMHG | WEIGHT: 143 LBS | RESPIRATION RATE: 18 BRPM | TEMPERATURE: 97.2 F | BODY MASS INDEX: 24.41 KG/M2 | SYSTOLIC BLOOD PRESSURE: 130 MMHG

## 2019-08-07 DIAGNOSIS — Z12.11 SCREENING FOR COLON CANCER: ICD-10-CM

## 2019-08-07 PROCEDURE — 99202 OFFICE O/P NEW SF 15 MIN: CPT | Performed by: INTERNAL MEDICINE

## 2019-08-07 NOTE — PROGRESS NOTES
[facility] - Outpatient Consultation  Leigha Elizabeth 76 y o  male MRN: [id number]  Encounter: [id number]          ASSESSMENT AND PLAN:      1  Screening for colon cancer  -he is due for colon cancer screening, refusing colonoscopy at this time, but is agreeable to do the FIT test so will order this    I doubt the history of Crohn's disease given he is asymptomatic    Follow up as needed  I will call him once I have reviewed the results     ________________________________________________________________    HPI:  76year old male referred by PCP  In 2008, he was told he had Crohn's disease and was treated with Malena Peasant for a few years but has now been off any medication since then  This was done by Dr Lizbeth Christina in the past   The pt reports his last colonoscopy was in 2008  Denies any family history of colon cancer  He denies any hematochezia, changes in bowel habits, or abdominal pain  He reports a "fabulous digestive system"  He reports no changes in bowel habits  Has a good appetite  He is active and retired  He is enjoying custodial  REVIEW OF SYSTEMS:    CONSTITUTIONAL: Denies any fever, chills, rigors, and weight loss  HEENT: No earache or tinnitus  Denies hearing loss or visual disturbances  CARDIOVASCULAR: No chest pain or palpitations  RESPIRATORY: Denies any cough, hemoptysis, shortness of breath or dyspnea on exertion  GASTROINTESTINAL: As noted in the History of Present Illness  GENITOURINARY: No problems with urination  Denies any hematuria or dysuria  NEUROLOGIC: No dizziness or vertigo, denies headaches  MUSCULOSKELETAL: Denies any muscle or joint pain  SKIN: Denies skin rashes or itching  ENDOCRINE: Denies excessive thirst  Denies intolerance to heat or cold  PSYCHOSOCIAL: Denies depression or anxiety  Denies any recent memory loss         Historical Information   Past Medical History:   Diagnosis Date    Acute gouty arthropathy Last Assessed: 6/9/2015    Arthritis     Bacterial pneumonia     Last Assessed: 6/21/2016    Depression     Fracture of rib, closed     Last Assessed: 6/21/2016    Gout     Hyperlipidemia     Hypertension     Melanoma ([address] )      Past Surgical History:   Procedure Laterality Date    EYE SURGERY      MALIGNANT SKIN LESION EXCISION      MOHS SURGERY      shaving of lesion moh's technique     [place] SHLDR ARTHROSCOP,LYSE ADHESNS Right 8/28/2017    Procedure: ARTHROSCOPY [facility] UNDER GENERAL ANESTHESIA ,LYSIS OF ADHESIONS, CAPSULECTOMY;  Surgeon: Ronn Gaytan MD;  Location: [facility];  Service: Orthopedics     Social History   Social History     Substance and Sexual Activity   Alcohol Use Not Currently    Comment: rare, being a social drinker      Social History     Substance and Sexual Activity   Drug Use No     Social History     Tobacco Use   Smoking Status Never Smoker   Smokeless Tobacco Never Used     Family History   Problem Relation Age of Onset    Cancer Mother     Aneurysm Father     No Known Problems Sister     Aortic aneurysm Family        Meds/Allergies       Current Outpatient Medications:     cyanocobalamin (VITAMIN B-12) 1,000 mcg tablet    losartan (COZAAR) 100 MG tablet    Misc Natural Products (OSTEO BI-FLEX ADV TRIPLE ST PO)    VITAMIN D, CHOLECALCIFEROL, PO    No Known Allergies        Objective     Blood pressure 130/74, pulse 70, temperature (!) 97 2 °F (36 2 °C), temperature source Tympanic, resp  rate 18, height 5' 4" (1 626 m), weight 64 9 kg (143 lb)  Body mass index is 24 55 kg/m²  PHYSICAL EXAM:      General Appearance:   Alert, cooperative, no distress   HEENT:   Normocephalic, atraumatic, anicteric      Neck:  Supple, symmetrical, trachea midline   Lungs:   Clear to auscultation bilaterally; no rales, rhonchi or wheezing; respirations unlabored    Heart[de-identified]   Regular rate and rhythm; no murmur, rub, or gallop     Abdomen:   Soft, non-tender, non-distended; normal bowel sounds; no masses, no organomegaly    Genitalia:   Deferred    Rectal:   Deferred    Extremities:  No cyanosis, clubbing or edema    Pulses:  2+ and symmetric    Skin:  No jaundice, rashes, or lesions    Lymph nodes:  No palpable cervical lymphadenopathy        Lab Results:   No visits with results within 1 Day(s) from this visit  Latest known visit with results is:   Hospital Outpatient Visit on 04/16/2019   Component Date Value    Protocol Name 04/16/2019 Heaven Sharpe Time In Exercise Phase 04/16/2019 00:06:11     MAX  SYSTOLIC BP 64/53/2610 768     Max Diastolic Bp 28/32/6125 88     Max Heart Rate 04/16/2019 134     Max Predicted Heart Rate 04/16/2019 146     Reason for Termination 04/16/2019 Dyspnea     Test Indication 04/16/2019 Screening for CAD     Target Hr Formular 04/16/2019 (220 - Age)*85%     Chest Pain Statement 04/16/2019 none          Radiology Results:   No results found

## 2019-09-10 ENCOUNTER — OFFICE VISIT (OUTPATIENT)
Dept: FAMILY MEDICINE CLINIC | Facility: CLINIC | Age: 74
End: 2019-09-10
Payer: MEDICARE

## 2019-09-10 VITALS
HEART RATE: 78 BPM | BODY MASS INDEX: 24.86 KG/M2 | SYSTOLIC BLOOD PRESSURE: 128 MMHG | HEIGHT: 64 IN | WEIGHT: 145.6 LBS | DIASTOLIC BLOOD PRESSURE: 70 MMHG | OXYGEN SATURATION: 96 %

## 2019-09-10 DIAGNOSIS — E78.5 DYSLIPIDEMIA: ICD-10-CM

## 2019-09-10 DIAGNOSIS — I10 ESSENTIAL HYPERTENSION: ICD-10-CM

## 2019-09-10 DIAGNOSIS — Z23 NEED FOR INFLUENZA VACCINATION: ICD-10-CM

## 2019-09-10 DIAGNOSIS — Z23 NEED FOR PNEUMOCOCCAL VACCINATION: ICD-10-CM

## 2019-09-10 DIAGNOSIS — Z13.31 DEPRESSION SCREENING NEGATIVE: ICD-10-CM

## 2019-09-10 DIAGNOSIS — Z00.00 MEDICARE ANNUAL WELLNESS VISIT, SUBSEQUENT: Primary | ICD-10-CM

## 2019-09-10 DIAGNOSIS — Z12.5 SCREENING FOR PROSTATE CANCER: ICD-10-CM

## 2019-09-10 PROCEDURE — G0008 ADMIN INFLUENZA VIRUS VAC: HCPCS | Performed by: PHYSICIAN ASSISTANT

## 2019-09-10 PROCEDURE — 90732 PPSV23 VACC 2 YRS+ SUBQ/IM: CPT | Performed by: PHYSICIAN ASSISTANT

## 2019-09-10 PROCEDURE — 90662 IIV NO PRSV INCREASED AG IM: CPT | Performed by: PHYSICIAN ASSISTANT

## 2019-09-10 PROCEDURE — G0009 ADMIN PNEUMOCOCCAL VACCINE: HCPCS | Performed by: PHYSICIAN ASSISTANT

## 2019-09-10 PROCEDURE — G0439 PPPS, SUBSEQ VISIT: HCPCS | Performed by: PHYSICIAN ASSISTANT

## 2019-09-10 NOTE — PROGRESS NOTES
Assessment and Plan:     Problem List Items Addressed This Visit        Cardiovascular and Mediastinum    Essential hypertension    Relevant Orders    CBC    Comprehensive metabolic panel       Other    Dyslipidemia    Relevant Orders    Lipid Panel with Direct LDL reflex      Other Visit Diagnoses     Medicare annual wellness visit, subsequent    -  Primary    Depression screening negative        Need for influenza vaccination        Relevant Orders    PREFERRED: influenza vaccine, 0226-2712, high-dose, PF 0 5 mL, for patients 65 yr+ (FLUZONE HIGH-DOSE) (Completed)    Need for pneumococcal vaccination        Relevant Orders    PNEUMOCOCCAL POLYSACCHARIDE VACCINE 23-VALENT =>1YO SQ IM (Completed)    Screening for prostate cancer        Relevant Orders    PSA, Total Screen         History of Present Illness:     Patient presents for Medicare Annual Wellness visit    Patient Care Team:  Laura Tellez PA-C as PCP - General (Physician Assistant)  DO Ramses Lin MD     Problem List:     Patient Active Problem List   Diagnosis    Rotator cuff tear arthropathy of right shoulder    Arthritis    Depression    Dyslipidemia    Erectile dysfunction of non-organic origin    Essential hypertension    Malignant [address] of [address] [address]    Primary osteoarthritis of both shoulders    Primary osteoarthritis of first carpometacarpal joint of right hand      Past Medical and Surgical History:     Past Medical History:   Diagnosis Date    Acute gouty arthropathy     Last Assessed: 6/9/2015    Arthritis     Bacterial pneumonia     Last Assessed: 6/21/2016    Depression     Fracture of rib, closed     Last Assessed: 6/21/2016    Gout     Hyperlipidemia     Hypertension     Melanoma ([facility] [address]      Past Surgical History:   Procedure Laterality Date    EYE SURGERY      MALIGNANT SKIN LESION EXCISION      MOHS SURGERY      shaving of lesion moh's technique     [place] SHLDR ARTHROSCOP,LYSE ADHESNS Right 8/28/2017 Procedure: ARTHROSCOPY [facility] UNDER GENERAL ANESTHESIA ,LYSIS OF ADHESIONS, CAPSULECTOMY;  Surgeon: Paulette Linares MD;  Location: [facility];  Service: Orthopedics      Family History:     Family History   Problem Relation Age of Onset    Cancer Mother     Aneurysm Father     No Known Problems Sister     Aortic aneurysm Family       Social History:     Social History     Tobacco Use   Smoking Status Never Smoker   Smokeless Tobacco Never Used     Social History     Substance and Sexual Activity   Alcohol Use Not Currently    Comment: rare, being a social drinker      Social History     Substance and Sexual Activity   Drug Use No      Medications and Allergies:     Current Outpatient Medications   Medication Sig Dispense Refill    cyanocobalamin (VITAMIN B-12) 1,000 mcg tablet Take 1,000 mcg by mouth daily   losartan (COZAAR) 100 MG tablet TAKE 1 TABLET EVERY DAY (NEED MD APPOINTMENT) 90 tablet 0    Misc Natural Products (OSTEO BI-FLEX ADV TRIPLE ST PO) Take by mouth      VITAMIN D, CHOLECALCIFEROL, PO Take 2,000 Units by mouth daily  No current facility-administered medications for this visit  No Known Allergies   Immunizations:     Immunization History   Administered Date(s) Administered    Influenza, high dose seasonal 0 5 mL 09/10/2019    Pneumococcal Conjugate 13-Valent 08/29/2018    Pneumococcal Polysaccharide PPV23 09/10/2019    Tdap 06/18/2016      Medicare Screening Tests and Risk Assessments:     Lilian Gan is here for his Subsequent Wellness visit  Health Risk Assessment:  Patient rates overall health as good  Eyesight was rated as Same  Hearing was rated as Same  Patient feels that their emotional and mental health rating is Same  Pain experienced by patient in the last 7 days has been None  Emotional/Mental Health:  Patient has not been feeling nervous/anxious      PHQ-9 Depression Screening:    Frequency of the following problems over the past two weeks: 1  Little interest or pleasure in doing things: 0 - not at all      2  Feeling down, depressed, or hopeless: 0 - not at all  PHQ-2 Score: 0          Broken Bones/Falls: Fall Risk Assessment:    In the past year, patient has experienced: No history of falling in past year          Bladder/Bowel:  Patient has not leaked urine accidently in the last six months  Patient reports no loss of bowel control  Immunizations:  Patient has not had a flu vaccination within the last year  Patient has not received a pneumonia shot  Patient has not received a shingles shot  Home Safety:  Patient does not have trouble with stairs inside or outside of their home  Patient currently reports that there are no safety hazards present in home, working smoke alarms, working carbon monoxide detectors  Nutrition:  Current diet: Regular with servings of the following:    Medications:  Patient is currently taking over-the-counter supplements  Patient is able to manage medications  Lifestyle Choices:  Patient reports no tobacco use  Patient has not smoked or used tobacco in the past   Patient reports no alcohol use  Patient drives a vehicle  Patient wears seat belt  Activities of Daily Living:  Can get out of bed by his or her self, able to dress self, able to make own meals, able to do own shopping, able to bathe self, can do own laundry/housekeeping, can manage own money, pay bills and track expenses    Previous Hospitalizations:  No hospitalization or ED visit in past 12 months        Advanced Directives:  Patient has decided on a power of   Patient has spoken to designated power of   Patient has completed advanced directive          Preventative Screening/Counseling:      Cardiovascular:      General: Risks and Benefits Discussed     Due for Labs/Analytes/Optional EKG: Lipid Panel          Diabetes:      General: Risks and Benefits Discussed      Due for labs: Blood Glucose Colorectal Cancer:      General: Risks and Benefits Discussed      Due for studies: Fecal Occult Blood and Colonoscopy - Low Risk          Prostate Cancer:      General: Risks and Benefits Discussed      Due for labs: PSA          Osteoporosis:      General: Screening Not Indicated          AAA:      General: Screening Not Indicated          Glaucoma:      General: Risks and Benefits Discussed      Comments: Pt will schedule his own appointment        HIV:      General: Patient Declines          Hepatitis C:      General: Patient Declines        Advanced Directives:   Patient has living will for healthcare, has durable POA for healthcare, patient has an advanced directive       Immunizations:      Influenza: Influenza Due Today and Influenza Recommended Annually      Pneumococcal: Risks & Benefits Discussed and Pneumococcal Due Today

## 2019-09-10 NOTE — PATIENT INSTRUCTIONS
Obesity   AMBULATORY CARE:   Obesity  is when your body mass index (BMI) is greater than 30  Your healthcare provider will use your height and weight to measure your BMI  The risks of obesity include  many health problems, such as injuries or physical disability  You may need tests to check for the following:  · Diabetes     · High blood pressure or high cholesterol     · Heart disease     · Gallbladder or liver disease     · Cancer of the colon, breast, prostate, liver, or kidney     · Sleep apnea     · Arthritis or gout  Seek care immediately if:   · You have a severe headache, confusion, or difficulty speaking  · You have weakness on one side of your body  · You have chest pain, sweating, or shortness of breath  Contact your healthcare provider if:   · You have symptoms of gallbladder or liver disease, such as pain in your upper abdomen  · You have knee or hip pain and discomfort while walking  · You have symptoms of diabetes, such as intense hunger and thirst, and frequent urination  · You have symptoms of sleep apnea, such as snoring or daytime sleepiness  · You have questions or concerns about your condition or care  Treatment for obesity  focuses on helping you lose weight to improve your health  Even a small decrease in BMI can reduce the risk for many health problems  Your healthcare provider will help you set a weight-loss goal   · Lifestyle changes  are the first step in treating obesity  These include making healthy food choices and getting regular physical activity  Your healthcare provider may suggest a weight-loss program that involves coaching, education, and therapy  · Medicine  may help you lose weight when it is used with a healthy diet and physical activity  · Surgery  can help you lose weight if you are very obese and have other health problems  There are several types of weight-loss surgery  Ask your healthcare provider for more information    Be successful losing weight:   · Set small, realistic goals  An example of a small goal is to walk for 20 minutes 5 days a week  Anther goal is to lose 5% of your body weight  · Tell friends, family members, and coworkers about your goals  and ask for their support  Ask a friend to lose weight with you, or join a weight-loss support group  · Identify foods or triggers that may cause you to overeat , and find ways to avoid them  Remove tempting high-calorie foods from your home and workplace  Place a bowl of fresh fruit on your kitchen counter  If stress causes you to eat, then find other ways to cope with stress  · Keep a diary to track what you eat and drink  Also write down how many minutes of physical activity you do each day  Weigh yourself once a week and record it in your diary  Eating changes: You will need to eat 500 to 1,000 fewer calories each day than you currently eat to lose 1 to 2 pounds a week  The following changes will help you cut calories:  · Eat smaller portions  Use small plates, no larger than 9 inches in diameter  Fill your plate half full of fruits and vegetables  Measure your food using measuring cups until you know what a serving size looks like  · Eat 3 meals and 1 or 2 snacks each day  Plan your meals in advance  Evaristo Shows and eat at home most of the time  Eat slowly  · Eat fruits and vegetables at every meal   They are low in calories and high in fiber, which makes you feel full  Do not add butter, margarine, or cream sauce to vegetables  Use herbs to season steamed vegetables  · Eat less fat and fewer fried foods  Eat more baked or grilled chicken and fish  These protein sources are lower in calories and fat than red meat  Limit fast food  Dress your salads with olive oil and vinegar instead of bottled dressing  · Limit the amount of sugar you eat  Do not drink sugary beverages  Limit alcohol  Activity changes:  Physical activity is good for your body in many ways   It helps you burn calories and build strong muscles  It decreases stress and depression, and improves your mood  It can also help you sleep better  Talk to your healthcare provider before you begin an exercise program   · Exercise for at least 30 minutes 5 days a week  Start slowly  Set aside time each day for physical activity that you enjoy and that is convenient for you  It is best to do both weight training and an activity that increases your heart rate, such as walking, bicycling, or swimming  · Find ways to be more active  Do yard work and housecleaning  Walk up the stairs instead of using elevators  Spend your leisure time going to events that require walking, such as outdoor festivals or fairs  This extra physical activity can help you lose weight and keep it off  Follow up with your healthcare provider as directed: You may need to meet with a dietitian  Write down your questions so you remember to ask them during your visits  © 2017 [address] [employer] Information is for End User's use only and may not be sold, redistributed or otherwise used for commercial purposes  All illustrations and images included in CareNotes® are the copyrighted property of [employer]  or James Cade  The above information is an  only  It is not intended as medical advice for individual conditions or treatments  Talk to your doctor, nurse or pharmacist before following any medical regimen to see if it is safe and effective for you  Urinary Incontinence   WHAT YOU NEED TO KNOW:   What is urinary incontinence? Urinary incontinence (UI) is when you lose control of your bladder  What causes UI? UI occurs because your bladder cannot store or empty urine properly  The following are the most common types of UI:  · Stress incontinence  is when you leak urine due to increased bladder pressure  This may happen when you cough, sneeze, or exercise       · Urge incontinence  is when you feel the need to urinate right away and leak urine accidentally  · Mixed incontinence  is when you have both stress and urge UI  What are the signs and symptoms of UI?   · You feel like your bladder does not empty completely when you urinate  · You urinate often and need to urinate immediately  · You leak urine when you sleep, or you wake up with the urge to urinate  · You leak urine when you cough, sneeze, exercise, or laugh  How is UI diagnosed? Your healthcare provider will ask how often you leak urine and whether you have stress or urge symptoms  Tell him which medicines you take, how often you urinate, and how much liquid you drink each day  You may need any of the following tests:  · Urine tests  may show infection or kidney function  · A pelvic exam  may be done to check for blockages  A pelvic exam will also show if your bladder, uterus, or other organs have moved out of place  · An x-ray, ultrasound, or CT  may show problems with parts of your urinary system  You may be given contrast liquid to help your organs show up better in the pictures  Tell the healthcare provider if you have ever had an allergic reaction to contrast liquid  Do not enter the MRI room with anything metal  Metal can cause serious injury  Tell the healthcare provider if you have any metal in or on your body  · A bladder scan  will show how much urine is left in your bladder after you urinate  You will be asked to urinate and then healthcare providers will use a small ultrasound machine to check the urine left in your bladder  · Cystometry  is used to check the function of your urinary system  Your healthcare provider checks the pressure in your bladder while filling it with fluid  Your bladder pressure may also be tested when your bladder is full and while you urinate  How is UI treated? · Medicines  can help strengthen your bladder control      · Electrical stimulation  is used to send a small amount of electrical energy to your pelvic floor muscles  This helps control your bladder function  Electrodes may be placed outside your body or in your rectum  For women, the electrodes may be placed in the vagina  · A bulking agent  may be injected into the wall of your urethra to make it thicker  This helps keep your urethra closed and decreases urine leakage  · Devices  such as a clamp, pessary, or tampon may help stop urine leaks  Ask your healthcare provider for more information about these and other devices  · Surgery  may be needed if other treatments do not work  Several types of surgery can help improve your bladder control  Ask your healthcare provider for more information about the surgery you may need  How can I manage my symptoms? · Do pelvic muscle exercises often  Your pelvic muscles help you stop urinating  Squeeze these muscles tight for 5 seconds, then relax for 5 seconds  Gradually work up to squeezing for 10 seconds  Do 3 sets of 15 repetitions a day, or as directed  This will help strengthen your pelvic muscles and improve bladder control  · A catheter  may be used to help empty your bladder  A catheter is a tiny, plastic tube that is put into your bladder to drain your urine  Your healthcare provider may tell you to use a catheter to prevent your bladder from getting too full and leaking urine  · Keep a UI record  Write down how often you leak urine and how much you leak  Make a note of what you were doing when you leaked urine  · Train your bladder  Go to the bathroom at set times, such as every 2 hours, even if you do not feel the urge to go  You can also try to hold your urine when you feel the urge to go  For example, hold your urine for 5 minutes when you feel the urge to go  As that becomes easier, hold your urine for 10 minutes  · Drink liquids as directed  Ask your healthcare provider how much liquid to drink each day and which liquids are best for you   You may need to limit the amount of liquid you drink to help control your urine leakage  Limit or do not have drinks that contain caffeine or alcohol  Do not drink any liquid right before you go to bed  · Prevent constipation  Eat a variety of high-fiber foods  Good examples are high-fiber cereals, beans, vegetables, and whole-grain breads  Prune juice may help make your bowel movement softer  Walking is the best way to trigger your intestines to have a bowel movement  · Exercise regularly and maintain a healthy weight  Ask your healthcare provider how much you should weigh and about the best exercise plan for you  Weight loss and exercise will decrease pressure on your bladder and help you control your leakage  Ask him to help you create a weight loss plan if you are overweight  When should I seek immediate care? · You have severe pain  · You are confused or cannot think clearly  When should I contact my healthcare provider? · You have a fever  · You see blood in your urine  · You have pain when you urinate  · You have new or worse pain, even after treatment  · Your mouth feels dry or you have vision changes  · Your urine is cloudy or smells bad  · You have questions or concerns about your condition or care  CARE AGREEMENT:   You have the right to help plan your care  Learn about your health condition and how it may be treated  Discuss treatment options with your caregivers to decide what care you want to receive  You always have the right to refuse treatment  The above information is an  only  It is not intended as medical advice for individual conditions or treatments  Talk to your doctor, nurse or pharmacist before following any medical regimen to see if it is safe and effective for you  © 2017 [address] [employer] Information is for End User's use only and may not be sold, redistributed or otherwise used for commercial purposes   All illustrations and images included in CareNotes® are the copyrighted property of [employer]  or James Cade  Cigarette Smoking and Your Health   AMBULATORY CARE:   Risks to your health if you smoke:  Nicotine and other chemicals found in tobacco damage every cell in your body  Even if you are a light smoker, you have an increased risk for cancer, heart disease, and lung disease  If you are pregnant or have diabetes, smoking increases your risk for complications  Benefits to your health if you stop smoking:   · You decrease respiratory symptoms such as coughing, wheezing, and shortness of breath  · You reduce your risk for cancers of the lung, mouth, throat, kidney, bladder, pancreas, stomach, and cervix  If you already have cancer, you increase the benefits of chemotherapy  You also reduce your risk for cancer returning or a second cancer from developing  · You reduce your risk for heart disease, blood clots, heart attack, and stroke  · You reduce your risk for lung infections, and diseases such as pneumonia, asthma, chronic bronchitis, and emphysema  · Your circulation improves  More oxygen can be delivered to your body  If you have diabetes, you lower your risk for complications, such as kidney, artery, and eye diseases  You also lower your risk for nerve damage  Nerve damage can lead to amputations, poor vision, and blindness  · You improve your body's ability to heal and to fight infections  Benefits to the health of others if you stop smoking:  Tobacco is harmful to nonsmokers who breathe in your secondhand smoke  The following are ways the health of others around you may improve when you stop smoking:  · You lower the risks for lung cancer and heart disease in nonsmoking adults  · If you are pregnant, you lower the risk for miscarriage, early delivery, low birth weight, and stillbirth  You also lower your baby's risk for SIDS, obesity, developmental delay, and neurobehavioral problems, such as ADHD  · If you have children, you lower their risk for ear infections, colds, pneumonia, bronchitis, and asthma  For more information and support to stop smoking:   · Smokefree  gov  Phone: 4- 719 - 119-5636  Web Address: www smokefrDropThought  Follow up with your healthcare provider as directed:  Write down your questions so you remember to ask them during your visits  © 2017 [address] [employer] Information is for End User's use only and may not be sold, redistributed or otherwise used for commercial purposes  All illustrations and images included in CareNotes® are the copyrighted property of [employer]  or James Cade  The above information is an  only  It is not intended as medical advice for individual conditions or treatments  Talk to your doctor, nurse or pharmacist before following any medical regimen to see if it is safe and effective for you  Fall Prevention   WHAT YOU NEED TO KNOW:   What is fall prevention? Fall prevention includes ways to make your home and other areas safer  It also includes ways you can move more carefully to prevent a fall  What increases my risk for falls? · Lack of vitamin D    · Not getting enough sleep each night    · Trouble walking or keeping your balance, or foot problems    · Health conditions that cause changes in your blood pressure, vision, or muscle strength and coordination    · Medicines that make you dizzy, weak, or sleepy    · Problems seeing clearly    · Shoes that have high heels or are not supportive    · Tripping hazards, such as items left on the floor, no handrails on the stairs, or broken steps  How can I help protect myself from falls? · Stand or sit up slowly  This may help you keep your balance and prevent falls  If you need to get up during the night, sit up first  Be sure you are fully awake before you stand  Turn on the light before you start walking  Go slowly in case you are still sleepy   Make sure you will not trip over any pets sleeping in the bedroom  · Use assistive devices as directed  Your healthcare provider may suggest that you use a cane or walker to help you keep your balance  You may need to have grab bars put in your bathroom near the toilet or in the shower  · Wear shoes that fit well and have soles that   Wear shoes both inside and outside  Use slippers with good   Do not wear shoes with high heels  · Wear a personal alarm  This is a device that allows you to call 911 if you fall and need help  Ask your healthcare provider for more information  · Stay active  Exercise can help strengthen your muscles and improve your balance  Your healthcare provider may recommend water aerobics or walking  He or she may also recommend physical therapy to improve your coordination  Never start an exercise program without talking to your healthcare provider first      · Manage medical conditions  Keep all appointments with your healthcare providers  Visit your eye doctor as directed  How can I make my home safer? · Add items to prevent falls in the bathroom  Put nonslip strips on your bath or shower floor to prevent you from slipping  Use a bath mat if you do not have carpet in the bathroom  This will prevent you from falling when you step out of the bath or shower  Use a shower seat so you do not need to stand while you shower  Sit on the toilet or a chair in your bathroom to dry yourself and put on clothing  This will prevent you from losing your balance from drying or dressing yourself while you are standing  · Keep paths clear  Remove books, shoes, and other objects from walkways and stairs  Place cords for telephones and lamps out of the way so that you do not need to walk over them  Tape them down if you cannot move them  Remove small rugs  If you cannot remove a rug, secure it with double-sided tape  This will prevent you from tripping  · Install bright lights in your home  Use night lights to help light paths to the bathroom or kitchen  Always turn on the light before you start walking  · Keep items you use often on shelves within reach  Do not use a step stool to help you reach an item  · Paint or place reflective tape on the edges of your stairs  This will help you see the stairs better  Call 911 or have someone else call if:   · You have fallen and are unconscious  · You have fallen and cannot move part of your body  Contact your healthcare provider if:   · You have fallen and have pain or a headache  · You have questions or concerns about your condition or care  CARE AGREEMENT:   You have the right to help plan your care  Learn about your health condition and how it may be treated  Discuss treatment options with your caregivers to decide what care you want to receive  You always have the right to refuse treatment  The above information is an  only  It is not intended as medical advice for individual conditions or treatments  Talk to your doctor, nurse or pharmacist before following any medical regimen to see if it is safe and effective for you  © 2017 [address] [facility] Information is for End User's use only and may not be sold, redistributed or otherwise used for commercial purposes  All illustrations and images included in CareNotes® are the copyrighted property of [employer]  or James Cade  Advance Directives   WHAT YOU NEED TO KNOW:   What are advance directives? Advance directives are legal documents that state your wishes and plans for medical care  These plans are made ahead of time in case you lose your ability to make decisions for yourself  Advance directives can apply to any medical decision, such as the treatments you want, and if you want to donate organs  What are the types of advance directives? There are many types of advance directives, and each state has rules about how to use them   You may choose a combination of any of the following:  · Living will: This is a written record of the treatment you want  You can also choose which treatments you do not want, which to limit, and which to stop at a certain time  This includes surgery, medicine, IV fluid, and tube feedings  · Durable power of  for healthcare [facility]): This is a written record that states who you want to make healthcare choices for you when you are unable to make them for yourself  This person, called a proxy, is usually a family member or a friend  You may choose more than 1 proxy  · Do not resuscitate (DNR) order:  A DNR order is used in case your heart stops beating or you stop breathing  It is a request not to have certain forms of treatment, such as CPR  A DNR order may be included in other types of advance directives  · Medical directive: This covers the care that you want if you are in a coma, near death, or unable to make decisions for yourself  You can list the treatments you want for each condition  Treatment may include pain medicine, surgery, blood transfusions, dialysis, IV or tube feedings, and a ventilator (breathing machine)  · Values history: This document has questions about your views, beliefs, and how you feel and think about life  This information can help others choose the care that you would choose  Why are advance directives important? An advance directive helps you control your care  Although spoken wishes may be used, it is better to have your wishes written down  Spoken wishes can be misunderstood, or not followed  Treatments may be given even if you do not want them  An advance directive may make it easier for your family to make difficult choices about your care  How do I decide what to put in my advance directives? · Make informed decisions:  Make sure you fully understand treatments or care you may receive   Think about the benefits and problems your decisions could cause for you or your family  Talk to healthcare providers if you have concerns or questions before you write down your wishes  You may also want to talk with your Worship or , or a   Check your state laws to make sure that what you put in your advance directive is legal      · Sign all forms:  Sign and date your advance directive when you have finished  You may also need 2 witnesses to sign the forms  Witnesses cannot be your doctor or his staff, your spouse, heirs or beneficiaries, people you owe money to, or your chosen proxy  Talk to your family, proxy, and healthcare providers about your advance directive  Give each person a copy, and keep one for yourself in a place you can get to easily  Do not keep it hidden or locked away  · Review and revise your plans: You can revise your advance directive at any time, as long as you are able to make decisions  Review your plan every year, and when there are changes in your life, or your health  When you make changes, let your family, proxy, and healthcare providers know  Give each a new copy  Where can I find more information? · [employer]  [address] , [address]   Phone: 8- 140 - 921-1337  Phone: 0- 631 - [phone]  Web Address: http://www  aafp org  · [address] [facility])  [address] [facility], [address]  Phone: 4- 224 - [phone]  Phone: [phone]  Web Address: Adriana obregon  [facility] AGREEMENT:   You have the right to help plan your care  To help with this plan, you must learn about your health condition and treatment options  You must also learn about advance directives and how they are used  Work with your healthcare providers to decide what care will be used to treat you  You always have the right to refuse treatment  The above information is an  only   It is not intended as medical advice for individual conditions or treatments  Talk to your doctor, nurse or pharmacist before following any medical regimen to see if it is safe and effective for you  © 2017 [address] [employer] Information is for End User's use only and may not be sold, redistributed or otherwise used for commercial purposes  All illustrations and images included in CareNotes® are the copyrighted property of [employer]  or James Cade

## 2019-09-13 ENCOUNTER — APPOINTMENT (OUTPATIENT)
Dept: LAB | Facility: MEDICAL CENTER | Age: 74
End: 2019-09-13
Payer: MEDICARE

## 2019-09-13 DIAGNOSIS — I10 ESSENTIAL HYPERTENSION: ICD-10-CM

## 2019-09-13 DIAGNOSIS — E78.5 DYSLIPIDEMIA: ICD-10-CM

## 2019-09-13 DIAGNOSIS — Z12.5 SCREENING FOR PROSTATE CANCER: ICD-10-CM

## 2019-09-13 LAB
ALBUMIN SERPL BCP-MCNC: 3.6 G/DL (ref 3.5–5)
ALP SERPL-CCNC: 71 U/L (ref 46–116)
ALT SERPL W P-5'-P-CCNC: 19 U/L (ref 12–78)
ANION GAP SERPL CALCULATED.3IONS-SCNC: 5 MMOL/L (ref 4–13)
AST SERPL W P-5'-P-CCNC: 11 U/L (ref 5–45)
BILIRUB SERPL-MCNC: 0.48 MG/DL (ref 0.2–1)
BUN SERPL-MCNC: 23 MG/DL (ref 5–25)
CALCIUM SERPL-MCNC: 9.2 MG/DL (ref 8.3–10.1)
CHLORIDE SERPL-SCNC: 111 MMOL/L (ref 100–108)
CHOLEST SERPL-MCNC: 164 MG/DL (ref 50–200)
CO2 SERPL-SCNC: 28 MMOL/L (ref 21–32)
CREAT SERPL-MCNC: 0.98 MG/DL (ref 0.6–1.3)
ERYTHROCYTE [DISTWIDTH] IN BLOOD BY AUTOMATED COUNT: 13.4 % (ref 11.6–15.1)
GFR SERPL CREATININE-BSD FRML MDRD: 76 ML/MIN/1.73SQ M
GLUCOSE P FAST SERPL-MCNC: 88 MG/DL (ref 65–99)
HCT VFR BLD AUTO: 41.9 % (ref 36.5–49.3)
HDLC SERPL-MCNC: 66 MG/DL (ref 40–60)
HGB BLD-MCNC: 13.1 G/DL (ref 12–17)
LDLC SERPL CALC-MCNC: 90 MG/DL (ref 0–100)
MCH RBC QN AUTO: 28.5 PG (ref 26.8–34.3)
MCHC RBC AUTO-ENTMCNC: 31.3 G/DL (ref 31.4–37.4)
MCV RBC AUTO: 91 FL (ref 82–98)
PLATELET # BLD AUTO: 221 THOUSANDS/UL (ref 149–390)
PMV BLD AUTO: 8.9 FL (ref 8.9–12.7)
POTASSIUM SERPL-SCNC: 4.5 MMOL/L (ref 3.5–5.3)
PROT SERPL-MCNC: 7.5 G/DL (ref 6.4–8.2)
PSA SERPL-MCNC: 1.3 NG/ML (ref 0–4)
RBC # BLD AUTO: 4.6 MILLION/UL (ref 3.88–5.62)
SODIUM SERPL-SCNC: 144 MMOL/L (ref 136–145)
TRIGL SERPL-MCNC: 42 MG/DL
WBC # BLD AUTO: 5.66 THOUSAND/UL (ref 4.31–10.16)

## 2019-09-13 PROCEDURE — 85027 COMPLETE CBC AUTOMATED: CPT

## 2019-09-13 PROCEDURE — 80061 LIPID PANEL: CPT

## 2019-09-13 PROCEDURE — 80053 COMPREHEN METABOLIC PANEL: CPT

## 2019-09-13 PROCEDURE — 36415 COLL VENOUS BLD VENIPUNCTURE: CPT

## 2019-09-13 PROCEDURE — G0103 PSA SCREENING: HCPCS

## 2019-11-06 ENCOUNTER — OFFICE VISIT (OUTPATIENT)
Dept: OBGYN CLINIC | Facility: CLINIC | Age: 74
End: 2019-11-06
Payer: MEDICARE

## 2019-11-06 VITALS
SYSTOLIC BLOOD PRESSURE: 144 MMHG | DIASTOLIC BLOOD PRESSURE: 78 MMHG | HEIGHT: 64 IN | BODY MASS INDEX: 24.59 KG/M2 | HEART RATE: 69 BPM | WEIGHT: 144 LBS

## 2019-11-06 DIAGNOSIS — M19.011 PRIMARY OSTEOARTHRITIS OF RIGHT SHOULDER: Primary | ICD-10-CM

## 2019-11-06 DIAGNOSIS — M19.012 ARTHRITIS OF LEFT GLENOHUMERAL JOINT: ICD-10-CM

## 2019-11-06 PROCEDURE — 99212 OFFICE O/P EST SF 10 MIN: CPT | Performed by: ORTHOPAEDIC SURGERY

## 2019-11-06 NOTE — PROGRESS NOTES
76 y o male presents for follow up of right shoulder osteoarthritis status post arthroscopic release with TAL 08/28/2017  Patient is well pleased with his outcome he has no significant pain  He notes his left shoulder is little stiff and bothersome but does not want any intervention for his left shoulder at current  He has a history of osteoarthritis of the left shoulder as well  Denies numbness or tingling  He is very functional with the use of his right arm which is his dominant hand  Patient is able to hand pole machinery like weed whacker and chain saw  Review of Systems  Review of systems negative unless otherwise specified in HPI    Past Medical History  Past Medical History:   Diagnosis Date    Acute gouty arthropathy     Last Assessed: 6/9/2015    Arthritis     Bacterial pneumonia     Last Assessed: 6/21/2016    Depression     Fracture of rib, closed     Last Assessed: 6/21/2016    Gout     Hyperlipidemia     Hypertension     Melanoma ([address] )        Past Surgical History  Past Surgical History:   Procedure Laterality Date    EYE SURGERY      MALIGNANT SKIN LESION EXCISION      MOHS SURGERY      shaving of lesion moh's technique     OR SHLDR ARTHROSCOP,LYSE ADHESNS Right 8/28/2017    Procedure: ARTHROSCOPY [facility] UNDER GENERAL ANESTHESIA ,LYSIS OF ADHESIONS, CAPSULECTOMY;  Surgeon: Akila Corbin MD;  Location: [facility];  Service: Orthopedics     Current Medications  Current Outpatient Medications on File Prior to Visit   Medication Sig Dispense Refill    cyanocobalamin (VITAMIN B-12) 1,000 mcg tablet Take 1,000 mcg by mouth daily   losartan (COZAAR) 100 MG tablet TAKE 1 TABLET EVERY DAY (NEED MD APPOINTMENT) 90 tablet 0    Misc Natural Products (OSTEO BI-FLEX ADV TRIPLE ST PO) Take by mouth      VITAMIN D, CHOLECALCIFEROL, PO Take 2,000 Units by mouth daily  No current facility-administered medications on file prior to visit        Recent Labs (HCT,HGB,PT,INR,ESR,CRP,GLU,HgA1C)  0   Lab Value Date/Time    HCT 41 9 09/13/2019 0727    HCT 45 4 10/31/2014 0906    HGB 13 1 09/13/2019 0727    HGB 14 4 10/31/2014 0906    WBC 5 66 09/13/2019 0727    WBC 5 93 10/31/2014 0906    INR 1 00 08/23/2017 0824    GLUCOSE 84 10/31/2014 0906     Physical exam  · General: Awake, Alert, Oriented, BMI 24 7  · Eyes: Pupils equal, round and reactive to light  · Heart: regular rate and rhythm  · Lungs: No audible wheezing  · Abdomen: soft  right Shoulder active forward flexion 155°, passive 165  Internal rotation behind at level of L1  He is able to get to his hair and teeth without difficulty  His external rotation with the arm abducted is approximately 70° with internal approximately 30° passively  No weakness with resisted external rotation or supraspinatus empty can test   One palpable click with passive range of motion  Left shoulder notes forward flexion to 90° and abduction 85° actively  No gross weakness  He is neurovascular intact to the bilateral upper extremities to light touch in radial pulse 4/4  Procedure  Right shoulder 08/28/2017 arthroscopic TAL    Imaging  None today    1  Primary osteoarthritis of right shoulder    2  Arthritis of left glenohumeral joint      Assessment:  bilateral shoulder shoulder arthritis with right shoulder done extremely well postoperatively and left shoulder normal bothersome despite loss of motion in a patient who does not want intervention with therapy or injection of the left shoulder at this point time  Plan: We will see the patient back on an as-needed basis for likely cortisone injections  He was instructed to maintain motion of both shoulders

## 2019-11-06 NOTE — PATIENT INSTRUCTIONS
We will see the patient back on an as-needed basis for likely cortisone injections  He was instructed to maintain motion of both shoulders

## 2019-12-11 DIAGNOSIS — I10 ESSENTIAL HYPERTENSION: ICD-10-CM

## 2019-12-11 RX ORDER — LOSARTAN POTASSIUM 100 MG/1
100 TABLET ORAL DAILY
Qty: 90 TABLET | Refills: 0 | Status: SHIPPED | OUTPATIENT
Start: 2019-12-11 | End: 2020-03-17 | Stop reason: SDUPTHER

## 2020-03-17 ENCOUNTER — OFFICE VISIT (OUTPATIENT)
Dept: FAMILY MEDICINE CLINIC | Facility: CLINIC | Age: 75
End: 2020-03-17
Payer: MEDICARE

## 2020-03-17 ENCOUNTER — APPOINTMENT (OUTPATIENT)
Dept: LAB | Facility: MEDICAL CENTER | Age: 75
End: 2020-03-17
Payer: MEDICARE

## 2020-03-17 VITALS
SYSTOLIC BLOOD PRESSURE: 124 MMHG | WEIGHT: 142.6 LBS | BODY MASS INDEX: 24.34 KG/M2 | HEIGHT: 64 IN | DIASTOLIC BLOOD PRESSURE: 84 MMHG

## 2020-03-17 DIAGNOSIS — I10 ESSENTIAL HYPERTENSION: ICD-10-CM

## 2020-03-17 DIAGNOSIS — Z12.11 SCREENING FOR COLON CANCER: ICD-10-CM

## 2020-03-17 DIAGNOSIS — I10 ESSENTIAL HYPERTENSION: Primary | ICD-10-CM

## 2020-03-17 DIAGNOSIS — Z13.31 DEPRESSION SCREENING NEGATIVE: ICD-10-CM

## 2020-03-17 DIAGNOSIS — Z11.59 NEED FOR HEPATITIS C SCREENING TEST: ICD-10-CM

## 2020-03-17 LAB — HCV AB SER QL: NORMAL

## 2020-03-17 PROCEDURE — 1160F RVW MEDS BY RX/DR IN RCRD: CPT | Performed by: PHYSICIAN ASSISTANT

## 2020-03-17 PROCEDURE — 86803 HEPATITIS C AB TEST: CPT | Performed by: PHYSICIAN ASSISTANT

## 2020-03-17 PROCEDURE — 36415 COLL VENOUS BLD VENIPUNCTURE: CPT | Performed by: PHYSICIAN ASSISTANT

## 2020-03-17 PROCEDURE — 3008F BODY MASS INDEX DOCD: CPT | Performed by: PHYSICIAN ASSISTANT

## 2020-03-17 PROCEDURE — 1036F TOBACCO NON-USER: CPT | Performed by: PHYSICIAN ASSISTANT

## 2020-03-17 PROCEDURE — 3074F SYST BP LT 130 MM HG: CPT | Performed by: PHYSICIAN ASSISTANT

## 2020-03-17 PROCEDURE — 4040F PNEUMOC VAC/ADMIN/RCVD: CPT | Performed by: PHYSICIAN ASSISTANT

## 2020-03-17 PROCEDURE — 3079F DIAST BP 80-89 MM HG: CPT | Performed by: PHYSICIAN ASSISTANT

## 2020-03-17 PROCEDURE — 99213 OFFICE O/P EST LOW 20 MIN: CPT | Performed by: PHYSICIAN ASSISTANT

## 2020-03-17 RX ORDER — LOSARTAN POTASSIUM 100 MG/1
100 TABLET ORAL DAILY
Qty: 90 TABLET | Refills: 1 | Status: SHIPPED | OUTPATIENT
Start: 2020-03-17 | End: 2020-03-17 | Stop reason: SDUPTHER

## 2020-03-17 RX ORDER — LOSARTAN POTASSIUM 100 MG/1
100 TABLET ORAL DAILY
Qty: 90 TABLET | Refills: 1 | Status: SHIPPED | OUTPATIENT
Start: 2020-03-17 | End: 2020-07-30 | Stop reason: SDUPTHER

## 2020-03-17 NOTE — PROGRESS NOTES
Assessment/Plan:    Problem List Items Addressed This Visit        Cardiovascular and Mediastinum    Essential hypertension - Primary      Other Visit Diagnoses     Need for hepatitis C screening test        Relevant Orders    Hepatitis C antibody    Screening for colon cancer        Relevant Orders    Occult Blood, Fecal Immunochemical    Depression screening negative               Diagnoses and all orders for this visit:    Essential hypertension  -     Discontinue: losartan (COZAAR) 100 MG tablet; Take 1 tablet (100 mg total) by mouth daily    Need for hepatitis C screening test  -     Hepatitis C antibody    Screening for colon cancer  -     Occult Blood, Fecal Immunochemical; Future    Depression screening negative        No problem-specific Assessment & Plan notes found for this encounter  Subjective:      Patient ID: Manav Wong is a 76 y o  male  Caswell is here today for a BP follow up  He is doing well  He is a little disappointed as his family was scheduled to take a family vacation on a cruise, but trip had to be cancelled due to Covid 19 at this time  He does have  FIT test at home for which I reminded him to please complete the test and drop off card at lab asap  The following portions of the patient's history were reviewed and updated as appropriate:   He has a past medical history of Acute gouty arthropathy, Arthritis, Bacterial pneumonia, Depression, Fracture of rib, closed, Gout, Hyperlipidemia, Hypertension, and Melanoma ([facility] )  ,  does not have any pertinent problems on file  ,   has a past surgical history that includes Eye surgery; Malignant skin lesion excision; nazario dover arthroscopsky (Right, 8/28/2017); and Mohs surgery  ,  family history includes Aneurysm in his father; Aortic aneurysm in his family; Cancer in his mother; No Known Problems in his sister  ,   reports that he has never smoked  He has never used smokeless tobacco  He reports that he drank alcohol  He reports that he does not use drugs  ,  has No Known Allergies     Current Outpatient Medications   Medication Sig Dispense Refill    cyanocobalamin (VITAMIN B-12) 1,000 mcg tablet Take 1,000 mcg by mouth daily   Misc Natural Products (OSTEO BI-FLEX ADV TRIPLE ST PO) Take by mouth      VITAMIN D, CHOLECALCIFEROL, PO Take 2,000 Units by mouth daily   losartan (COZAAR) 100 MG tablet Take 1 tablet (100 mg total) by mouth daily 90 tablet 1     No current facility-administered medications for this visit  Review of Systems   Constitutional: Negative for activity change, appetite change, chills, diaphoresis, fatigue, fever and unexpected weight change  HENT: Negative for congestion, ear pain, postnasal drip, rhinorrhea, sinus pressure, sinus pain, sneezing, sore throat, tinnitus and voice change  Eyes: Negative for pain, redness and visual disturbance  Respiratory: Negative for cough, chest tightness, shortness of breath and wheezing  Cardiovascular: Negative for chest pain, palpitations and leg swelling  Gastrointestinal: Negative for abdominal pain, blood in stool, constipation, diarrhea, nausea and vomiting  Genitourinary: Negative for difficulty urinating, dysuria, frequency, hematuria and urgency  Musculoskeletal: Negative for arthralgias, back pain, gait problem, joint swelling, myalgias, neck pain and neck stiffness  Skin: Negative for color change, pallor, rash and wound  Neurological: Negative for dizziness, tremors, weakness, light-headedness and headaches  Psychiatric/Behavioral: Negative for dysphoric mood, self-injury, sleep disturbance and suicidal ideas  The patient is not nervous/anxious  Objective:  Vitals:    03/17/20 0818   BP: 124/84   BP Location: Left arm   Patient Position: Sitting   Weight: 64 7 kg (142 lb 9 6 oz)   Height: 5' 4" (1 626 m)     Body mass index is 24 48 kg/m²       Physical Exam   Constitutional: He is oriented to person, place, and time  He appears well-developed and well-nourished  No distress  HENT:   Head: Normocephalic and atraumatic  Right Ear: Hearing, tympanic membrane, external ear and ear canal normal    Left Ear: Hearing, tympanic membrane, external ear and ear canal normal    Mouth/Throat: Uvula is midline, oropharynx is clear and moist and mucous membranes are normal  No oropharyngeal exudate  Eyes: Conjunctivae are normal  Right eye exhibits no discharge  Left eye exhibits no discharge  No scleral icterus  Neck: Neck supple  Carotid bruit is not present  No thyromegaly present  Cardiovascular: Normal rate, regular rhythm and normal heart sounds  No murmur heard  Pulmonary/Chest: Effort normal and breath sounds normal  No respiratory distress  He has no wheezes  Abdominal: Soft  Bowel sounds are normal  He exhibits no distension and no mass  There is no tenderness  There is no rebound and no guarding  Musculoskeletal: Normal range of motion  He exhibits no edema or tenderness  Lymphadenopathy:     He has no cervical adenopathy  Neurological: He is alert and oriented to person, place, and time  Skin: Skin is warm and dry  No rash noted  He is not diaphoretic  No erythema  Psychiatric: He has a normal mood and affect  His behavior is normal  Judgment and thought content normal    Vitals reviewed          PHQ-9 Depression Screening    PHQ-9:    Frequency of the following problems over the past two weeks:       Little interest or pleasure in doing things:  0 - not at all  Feeling down, depressed, or hopeless:  0 - not at all  PHQ-2 Score:  0

## 2020-04-23 ENCOUNTER — TELEPHONE (OUTPATIENT)
Dept: FAMILY MEDICINE CLINIC | Facility: CLINIC | Age: 75
End: 2020-04-23

## 2020-04-24 ENCOUNTER — TELEMEDICINE (OUTPATIENT)
Dept: FAMILY MEDICINE CLINIC | Facility: CLINIC | Age: 75
End: 2020-04-24
Payer: MEDICARE

## 2020-04-24 VITALS — HEIGHT: 62 IN | BODY MASS INDEX: 25.03 KG/M2 | WEIGHT: 136 LBS

## 2020-04-24 DIAGNOSIS — F41.1 GAD (GENERALIZED ANXIETY DISORDER): Primary | ICD-10-CM

## 2020-04-24 PROCEDURE — 99442 PR PHYS/QHP TELEPHONE EVALUATION 11-20 MIN: CPT | Performed by: PHYSICIAN ASSISTANT

## 2020-04-24 RX ORDER — ESCITALOPRAM OXALATE 10 MG/1
10 TABLET ORAL DAILY
Qty: 30 TABLET | Refills: 0 | Status: SHIPPED | OUTPATIENT
Start: 2020-04-24 | End: 2020-05-20 | Stop reason: SDUPTHER

## 2020-05-08 ENCOUNTER — TELEPHONE (OUTPATIENT)
Dept: FAMILY MEDICINE CLINIC | Facility: CLINIC | Age: 75
End: 2020-05-08

## 2020-05-20 DIAGNOSIS — F41.1 GAD (GENERALIZED ANXIETY DISORDER): ICD-10-CM

## 2020-05-20 RX ORDER — ESCITALOPRAM OXALATE 10 MG/1
10 TABLET ORAL DAILY
Qty: 30 TABLET | Refills: 0 | Status: SHIPPED | OUTPATIENT
Start: 2020-05-20 | End: 2020-06-19 | Stop reason: SDUPTHER

## 2020-05-27 ENCOUNTER — OFFICE VISIT (OUTPATIENT)
Dept: FAMILY MEDICINE CLINIC | Facility: CLINIC | Age: 75
End: 2020-05-27
Payer: MEDICARE

## 2020-05-27 VITALS
WEIGHT: 130 LBS | DIASTOLIC BLOOD PRESSURE: 82 MMHG | BODY MASS INDEX: 23.92 KG/M2 | TEMPERATURE: 95.6 F | HEIGHT: 62 IN | HEART RATE: 69 BPM | OXYGEN SATURATION: 94 % | SYSTOLIC BLOOD PRESSURE: 132 MMHG

## 2020-05-27 DIAGNOSIS — F41.9 ANXIETY: Primary | ICD-10-CM

## 2020-05-27 PROCEDURE — 1160F RVW MEDS BY RX/DR IN RCRD: CPT | Performed by: PHYSICIAN ASSISTANT

## 2020-05-27 PROCEDURE — 1036F TOBACCO NON-USER: CPT | Performed by: PHYSICIAN ASSISTANT

## 2020-05-27 PROCEDURE — 3079F DIAST BP 80-89 MM HG: CPT | Performed by: PHYSICIAN ASSISTANT

## 2020-05-27 PROCEDURE — 4040F PNEUMOC VAC/ADMIN/RCVD: CPT | Performed by: PHYSICIAN ASSISTANT

## 2020-05-27 PROCEDURE — 3008F BODY MASS INDEX DOCD: CPT | Performed by: PHYSICIAN ASSISTANT

## 2020-05-27 PROCEDURE — 3075F SYST BP GE 130 - 139MM HG: CPT | Performed by: PHYSICIAN ASSISTANT

## 2020-05-27 PROCEDURE — 99214 OFFICE O/P EST MOD 30 MIN: CPT | Performed by: PHYSICIAN ASSISTANT

## 2020-06-06 ENCOUNTER — OFFICE VISIT (OUTPATIENT)
Dept: URGENT CARE | Facility: CLINIC | Age: 75
End: 2020-06-06
Payer: MEDICARE

## 2020-06-06 VITALS
SYSTOLIC BLOOD PRESSURE: 151 MMHG | RESPIRATION RATE: 18 BRPM | WEIGHT: 130 LBS | BODY MASS INDEX: 23.92 KG/M2 | HEIGHT: 62 IN | TEMPERATURE: 97 F | HEART RATE: 69 BPM | DIASTOLIC BLOOD PRESSURE: 77 MMHG | OXYGEN SATURATION: 98 %

## 2020-06-06 DIAGNOSIS — N39.0 URINARY TRACT INFECTION WITH HEMATURIA, SITE UNSPECIFIED: Primary | ICD-10-CM

## 2020-06-06 DIAGNOSIS — R31.9 URINARY TRACT INFECTION WITH HEMATURIA, SITE UNSPECIFIED: Primary | ICD-10-CM

## 2020-06-06 DIAGNOSIS — R30.0 DYSURIA: ICD-10-CM

## 2020-06-06 LAB
SL AMB  POCT GLUCOSE, UA: ABNORMAL
SL AMB LEUKOCYTE ESTERASE,UA: ABNORMAL
SL AMB POCT BILIRUBIN,UA: ABNORMAL
SL AMB POCT BLOOD,UA: ABNORMAL
SL AMB POCT CLARITY,UA: CLEAR
SL AMB POCT COLOR,UA: ABNORMAL
SL AMB POCT KETONES,UA: ABNORMAL
SL AMB POCT NITRITE,UA: ABNORMAL
SL AMB POCT PH,UA: 5
SL AMB POCT SPECIFIC GRAVITY,UA: 1
SL AMB POCT URINE PROTEIN: ABNORMAL
SL AMB POCT UROBILINOGEN: 0.2

## 2020-06-06 PROCEDURE — G0463 HOSPITAL OUTPT CLINIC VISIT: HCPCS | Performed by: NURSE PRACTITIONER

## 2020-06-06 PROCEDURE — 81002 URINALYSIS NONAUTO W/O SCOPE: CPT | Performed by: NURSE PRACTITIONER

## 2020-06-06 PROCEDURE — 87086 URINE CULTURE/COLONY COUNT: CPT | Performed by: NURSE PRACTITIONER

## 2020-06-06 PROCEDURE — 99213 OFFICE O/P EST LOW 20 MIN: CPT | Performed by: NURSE PRACTITIONER

## 2020-06-06 RX ORDER — CEPHALEXIN 500 MG/1
500 CAPSULE ORAL 2 TIMES DAILY
Qty: 14 CAPSULE | Refills: 0 | Status: SHIPPED | OUTPATIENT
Start: 2020-06-06 | End: 2020-06-13

## 2020-06-07 ENCOUNTER — TELEPHONE (OUTPATIENT)
Dept: URGENT CARE | Facility: CLINIC | Age: 75
End: 2020-06-07

## 2020-06-07 LAB — BACTERIA UR CULT: NORMAL

## 2020-06-12 ENCOUNTER — OFFICE VISIT (OUTPATIENT)
Dept: FAMILY MEDICINE CLINIC | Facility: CLINIC | Age: 75
End: 2020-06-12
Payer: MEDICARE

## 2020-06-12 ENCOUNTER — APPOINTMENT (OUTPATIENT)
Dept: LAB | Facility: MEDICAL CENTER | Age: 75
End: 2020-06-12
Payer: MEDICARE

## 2020-06-12 ENCOUNTER — TELEPHONE (OUTPATIENT)
Dept: FAMILY MEDICINE CLINIC | Facility: CLINIC | Age: 75
End: 2020-06-12

## 2020-06-12 VITALS
SYSTOLIC BLOOD PRESSURE: 132 MMHG | DIASTOLIC BLOOD PRESSURE: 92 MMHG | HEIGHT: 62 IN | BODY MASS INDEX: 23.3 KG/M2 | OXYGEN SATURATION: 96 % | TEMPERATURE: 97.1 F | WEIGHT: 126.6 LBS | HEART RATE: 77 BPM

## 2020-06-12 DIAGNOSIS — R63.4 WEIGHT LOSS, UNINTENTIONAL: ICD-10-CM

## 2020-06-12 DIAGNOSIS — F41.9 ANXIETY: ICD-10-CM

## 2020-06-12 DIAGNOSIS — R19.7 DIARRHEA, UNSPECIFIED TYPE: ICD-10-CM

## 2020-06-12 DIAGNOSIS — K50.911 CROHN'S DISEASE WITH RECTAL BLEEDING, UNSPECIFIED GASTROINTESTINAL TRACT LOCATION (HCC): Primary | ICD-10-CM

## 2020-06-12 DIAGNOSIS — R35.1 NOCTURIA: ICD-10-CM

## 2020-06-12 PROBLEM — K50.919 CROHN'S DISEASE WITH COMPLICATION (HCC): Status: ACTIVE | Noted: 2020-06-12

## 2020-06-12 LAB
AMORPH URATE CRY URNS QL MICRO: ABNORMAL /HPF
BACTERIA UR QL AUTO: ABNORMAL /HPF
BILIRUB UR QL STRIP: NEGATIVE
CLARITY UR: ABNORMAL
COLOR UR: YELLOW
GLUCOSE UR STRIP-MCNC: NEGATIVE MG/DL
HGB UR QL STRIP.AUTO: ABNORMAL
KETONES UR STRIP-MCNC: ABNORMAL MG/DL
LEUKOCYTE ESTERASE UR QL STRIP: NEGATIVE
NITRITE UR QL STRIP: NEGATIVE
NON-SQ EPI CELLS URNS QL MICRO: ABNORMAL /HPF
PH UR STRIP.AUTO: 5.5 [PH]
PROT UR STRIP-MCNC: NEGATIVE MG/DL
PSA SERPL-MCNC: 1.3 NG/ML (ref 0–4)
RBC #/AREA URNS AUTO: ABNORMAL /HPF
SP GR UR STRIP.AUTO: 1.02 (ref 1–1.03)
URATE CRY URNS QL MICRO: ABNORMAL /HPF
UROBILINOGEN UR QL STRIP.AUTO: 0.2 E.U./DL
WBC #/AREA URNS AUTO: ABNORMAL /HPF

## 2020-06-12 PROCEDURE — 81001 URINALYSIS AUTO W/SCOPE: CPT | Performed by: PHYSICIAN ASSISTANT

## 2020-06-12 PROCEDURE — 87086 URINE CULTURE/COLONY COUNT: CPT

## 2020-06-12 PROCEDURE — 3075F SYST BP GE 130 - 139MM HG: CPT | Performed by: PHYSICIAN ASSISTANT

## 2020-06-12 PROCEDURE — 3080F DIAST BP >= 90 MM HG: CPT | Performed by: PHYSICIAN ASSISTANT

## 2020-06-12 PROCEDURE — 99214 OFFICE O/P EST MOD 30 MIN: CPT | Performed by: PHYSICIAN ASSISTANT

## 2020-06-12 PROCEDURE — 1160F RVW MEDS BY RX/DR IN RCRD: CPT | Performed by: PHYSICIAN ASSISTANT

## 2020-06-12 PROCEDURE — 4040F PNEUMOC VAC/ADMIN/RCVD: CPT | Performed by: PHYSICIAN ASSISTANT

## 2020-06-12 PROCEDURE — G0103 PSA SCREENING: HCPCS

## 2020-06-12 PROCEDURE — 1036F TOBACCO NON-USER: CPT | Performed by: PHYSICIAN ASSISTANT

## 2020-06-12 PROCEDURE — 36415 COLL VENOUS BLD VENIPUNCTURE: CPT

## 2020-06-12 PROCEDURE — 3008F BODY MASS INDEX DOCD: CPT | Performed by: PHYSICIAN ASSISTANT

## 2020-06-13 LAB — BACTERIA UR CULT: NORMAL

## 2020-06-15 DIAGNOSIS — R35.1 NOCTURIA: ICD-10-CM

## 2020-06-15 DIAGNOSIS — R31.29 OTHER MICROSCOPIC HEMATURIA: Primary | ICD-10-CM

## 2020-06-19 DIAGNOSIS — F41.1 GAD (GENERALIZED ANXIETY DISORDER): ICD-10-CM

## 2020-06-19 RX ORDER — ESCITALOPRAM OXALATE 10 MG/1
10 TABLET ORAL DAILY
Qty: 30 TABLET | Refills: 0 | Status: SHIPPED | OUTPATIENT
Start: 2020-06-19 | End: 2020-06-30 | Stop reason: SDUPTHER

## 2020-06-26 ENCOUNTER — TELEPHONE (OUTPATIENT)
Dept: GASTROENTEROLOGY | Facility: CLINIC | Age: 75
End: 2020-06-26

## 2020-06-26 ENCOUNTER — APPOINTMENT (OUTPATIENT)
Dept: LAB | Facility: HOSPITAL | Age: 75
End: 2020-06-26
Payer: MEDICARE

## 2020-06-26 ENCOUNTER — APPOINTMENT (OUTPATIENT)
Dept: LAB | Facility: MEDICAL CENTER | Age: 75
End: 2020-06-26
Payer: MEDICARE

## 2020-06-26 ENCOUNTER — TELEMEDICINE (OUTPATIENT)
Dept: GASTROENTEROLOGY | Facility: CLINIC | Age: 75
End: 2020-06-26
Payer: MEDICARE

## 2020-06-26 DIAGNOSIS — K50.911 CROHN'S DISEASE WITH RECTAL BLEEDING, UNSPECIFIED GASTROINTESTINAL TRACT LOCATION (HCC): Primary | ICD-10-CM

## 2020-06-26 DIAGNOSIS — K50.911 CROHN'S DISEASE WITH RECTAL BLEEDING, UNSPECIFIED GASTROINTESTINAL TRACT LOCATION (HCC): ICD-10-CM

## 2020-06-26 DIAGNOSIS — R63.4 WEIGHT LOSS, UNINTENTIONAL: ICD-10-CM

## 2020-06-26 DIAGNOSIS — R19.7 DIARRHEA, UNSPECIFIED TYPE: ICD-10-CM

## 2020-06-26 DIAGNOSIS — Z12.11 SCREENING FOR COLON CANCER: ICD-10-CM

## 2020-06-26 DIAGNOSIS — Z11.59 SCREENING FOR VIRAL DISEASE: Primary | ICD-10-CM

## 2020-06-26 LAB
BASOPHILS # BLD AUTO: 0.05 THOUSANDS/ΜL (ref 0–0.1)
BASOPHILS NFR BLD AUTO: 1 % (ref 0–1)
CRP SERPL QL: 4.9 MG/L
EOSINOPHIL # BLD AUTO: 0.24 THOUSAND/ΜL (ref 0–0.61)
EOSINOPHIL NFR BLD AUTO: 4 % (ref 0–6)
ERYTHROCYTE [DISTWIDTH] IN BLOOD BY AUTOMATED COUNT: 13.6 % (ref 11.6–15.1)
HCT VFR BLD AUTO: 42.7 % (ref 36.5–49.3)
HEMOCCULT STL QL IA: POSITIVE
HGB BLD-MCNC: 13.2 G/DL (ref 12–17)
IGA SERPL-MCNC: 207 MG/DL (ref 70–400)
IMM GRANULOCYTES # BLD AUTO: 0.02 THOUSAND/UL (ref 0–0.2)
IMM GRANULOCYTES NFR BLD AUTO: 0 % (ref 0–2)
LYMPHOCYTES # BLD AUTO: 1.06 THOUSANDS/ΜL (ref 0.6–4.47)
LYMPHOCYTES NFR BLD AUTO: 17 % (ref 14–44)
MCH RBC QN AUTO: 28.6 PG (ref 26.8–34.3)
MCHC RBC AUTO-ENTMCNC: 30.9 G/DL (ref 31.4–37.4)
MCV RBC AUTO: 92 FL (ref 82–98)
MONOCYTES # BLD AUTO: 0.82 THOUSAND/ΜL (ref 0.17–1.22)
MONOCYTES NFR BLD AUTO: 13 % (ref 4–12)
NEUTROPHILS # BLD AUTO: 4.23 THOUSANDS/ΜL (ref 1.85–7.62)
NEUTS SEG NFR BLD AUTO: 65 % (ref 43–75)
NRBC BLD AUTO-RTO: 0 /100 WBCS
PLATELET # BLD AUTO: 328 THOUSANDS/UL (ref 149–390)
PMV BLD AUTO: 8.3 FL (ref 8.9–12.7)
RBC # BLD AUTO: 4.62 MILLION/UL (ref 3.88–5.62)
WBC # BLD AUTO: 6.42 THOUSAND/UL (ref 4.31–10.16)

## 2020-06-26 PROCEDURE — 86140 C-REACTIVE PROTEIN: CPT

## 2020-06-26 PROCEDURE — 85025 COMPLETE CBC W/AUTO DIFF WBC: CPT

## 2020-06-26 PROCEDURE — 83516 IMMUNOASSAY NONANTIBODY: CPT

## 2020-06-26 PROCEDURE — 36415 COLL VENOUS BLD VENIPUNCTURE: CPT

## 2020-06-26 PROCEDURE — 82784 ASSAY IGA/IGD/IGG/IGM EACH: CPT

## 2020-06-26 PROCEDURE — 99214 OFFICE O/P EST MOD 30 MIN: CPT | Performed by: PHYSICIAN ASSISTANT

## 2020-06-26 PROCEDURE — G0328 FECAL BLOOD SCRN IMMUNOASSAY: HCPCS

## 2020-06-26 NOTE — H&P (VIEW-ONLY)
Virtual Regular Visit      Assessment/Plan:    1  Crohn's disease with rectal bleeding, unspecified gastrointestinal tract location ([facility] )  2  Diarrhea, unspecified type  3  Weight loss, unintentional    Patient reportedly has a history of Crohn's disease and was previously on Lialda but has not been on medications since 2011  He states recently he has had increased stool frequency, greater than 10 times per day, mucus, rectal bleeding, weight loss  This is very concerning for a flare of his Crohn's or possible malignancy  He has not had a colonoscopy since 2008  Therefore would recommend checking a CBC to make sure he is not anemic, calprotectin and CRP to measure inflammation as well as ruling out infectious diarrhea and celiac  Patient is agreeable to a colonoscopy at this time and we will schedule him as soon as possible  - Ambulatory referral to Gastroenterology  - CBC and differential; Future  - C-reactive protein; Future  - Calprotectin,Fecal; Future  - Colonoscopy; Future  - PAT Covid Screening; Future  - Clostridium difficile toxin by PCR with EIA; Future  - Stool Enteric Bacterial Panel by PCR; Future  - IgA; Future  - Tissue transglutaminase, IgA; Future        Problem List Items Addressed This Visit        Digestive    Crohn's disease with rectal bleeding ([facility] ) - Primary    Relevant Orders    CBC and differential    C-reactive protein    Calprotectin,Fecal       Other    Diarrhea    Weight loss, unintentional               Reason for visit is   Chief Complaint   Patient presents with    Virtual Regular Visit        Encounter provider Abbey Johnson PA-C    Provider located at [address] [place] [id number] [phone]      Recent Visits  No visits were found meeting these conditions     Showing recent visits within past 7 days and meeting all other requirements     Today's Visits  Date Type Provider Dept   06/26/20 Telemedicine [facility]   Showing today's visits and meeting all other requirements     Future Appointments  No visits were found meeting these conditions  Showing future appointments within next 150 days and meeting all other requirements        The patient was identified by name and date of birth  Jagjit Cross was informed that this is a telemedicine visit and that the visit is being conducted through telephone  It was my intent to perform this visit via video technology but the patient was not able to do a video connection so the visit was completed via audio telephone only  My office door was closed  No one else was in the room  He acknowledged consent and understanding of privacy and security of the video platform  The patient has agreed to participate and understands they can discontinue the visit at any time  Patient is aware this is a billable service  Subjective  Jagjit Cross is a 76 y o  male       HPI     This is a follow-up for Crohn's disease  Patient states he was diagnosed in 2008 was previously taking Lialda  He states he stopped the [employer] [address] in 2011 and has been asymptomatic up until recently  He states his wife has been in the hospital and he is under increased stress  He is complaining of multiple loose bowel movements, greater than 10 per day with mucus and occasional bleeding  He denies any abdominal pain  He denies any nausea, vomiting, heartburn or reflux  He states he has lost approximately 15 lb  His last colonoscopy was in 2008       Past Medical History:   Diagnosis Date    Acute gouty arthropathy     Last Assessed: 6/9/2015    Arthritis     Bacterial pneumonia     Last Assessed: 6/21/2016    Depression     Fracture of rib, closed     Last Assessed: 6/21/2016    Gout     Hyperlipidemia     Hypertension     Melanoma [facility])        Past Surgical History:   Procedure Laterality Date    EYE SURGERY      MALIGNANT SKIN LESION EXCISION      MOHS SURGERY      shaving of lesion moh's technique     CT SHLDR ARTHROSCOP,ANILA ADHESNS Right 8/28/2017    Procedure: ARTHROSCOPY [facility] UNDER GENERAL ANESTHESIA ,LYSIS OF ADHESIONS, CAPSULECTOMY;  Surgeon: Ivonne Garcia MD;  Location: [facility];  Service: Orthopedics       Current Outpatient Medications   Medication Sig Dispense Refill    cyanocobalamin (VITAMIN B-12) 1,000 mcg tablet Take 1,000 mcg by mouth daily   escitalopram (LEXAPRO) 10 mg tablet Take 1 tablet (10 mg total) by mouth daily 30 tablet 0    losartan (COZAAR) 100 MG tablet Take 1 tablet (100 mg total) by mouth daily 90 tablet 1    Misc Natural Products (OSTEO BI-FLEX ADV TRIPLE ST PO) Take by mouth      VITAMIN D, CHOLECALCIFEROL, PO Take 2,000 Units by mouth daily  No current facility-administered medications for this visit  No Known Allergies    Review of Systems   All other systems reviewed and are negative  Video Exam    There were no vitals filed for this visit  Physical Exam     Unable to perform    As a result of this visit, I have not referred the patient for further respiratory evaluation  I spent 10 minutes directly with the patient during this visit      [address] acknowledges that he has consented to an online visit or consultation  He understands that the online visit is based solely on information provided by him, and that, in the absence of a face-to-face physical evaluation by the physician, the diagnosis he receives is both limited and provisional in terms of accuracy and completeness  This is not intended to replace a full medical face-to-face evaluation by the physician  [address] understands and accepts these terms

## 2020-06-26 NOTE — TELEPHONE ENCOUNTER
Called and scheduled patient for a Colonoscopy with Dr Lotus Guerra on 8/19/2020, gave Miralax/ Dulcolax instructions  Went over [employer]  Patient expressed understanding

## 2020-06-26 NOTE — TELEPHONE ENCOUNTER
----- Message from Abram Lo PA-C sent at 6/26/2020  9:15 AM EDT -----  Level 2-3 colonoscopy w/ Miralax prep, f/u after  I told him to get labs & stool studies    Sylwia Haney

## 2020-06-27 LAB — TTG IGA SER-ACNC: <2 U/ML (ref 0–3)

## 2020-06-30 ENCOUNTER — APPOINTMENT (OUTPATIENT)
Dept: LAB | Facility: HOSPITAL | Age: 75
End: 2020-06-30
Payer: MEDICARE

## 2020-06-30 ENCOUNTER — OFFICE VISIT (OUTPATIENT)
Dept: FAMILY MEDICINE CLINIC | Facility: CLINIC | Age: 75
End: 2020-06-30
Payer: MEDICARE

## 2020-06-30 ENCOUNTER — TELEPHONE (OUTPATIENT)
Dept: GASTROENTEROLOGY | Facility: CLINIC | Age: 75
End: 2020-06-30

## 2020-06-30 VITALS
HEIGHT: 62 IN | BODY MASS INDEX: 23.04 KG/M2 | DIASTOLIC BLOOD PRESSURE: 66 MMHG | HEART RATE: 65 BPM | SYSTOLIC BLOOD PRESSURE: 128 MMHG | TEMPERATURE: 97.2 F | WEIGHT: 125.2 LBS | OXYGEN SATURATION: 91 %

## 2020-06-30 DIAGNOSIS — F51.05 INSOMNIA SECONDARY TO ANXIETY: ICD-10-CM

## 2020-06-30 DIAGNOSIS — R19.7 DIARRHEA, UNSPECIFIED TYPE: ICD-10-CM

## 2020-06-30 DIAGNOSIS — F41.9 INSOMNIA SECONDARY TO ANXIETY: ICD-10-CM

## 2020-06-30 DIAGNOSIS — F41.1 GAD (GENERALIZED ANXIETY DISORDER): Primary | ICD-10-CM

## 2020-06-30 DIAGNOSIS — K50.911 CROHN'S DISEASE WITH RECTAL BLEEDING, UNSPECIFIED GASTROINTESTINAL TRACT LOCATION (HCC): ICD-10-CM

## 2020-06-30 DIAGNOSIS — R63.4 WEIGHT LOSS, UNINTENTIONAL: ICD-10-CM

## 2020-06-30 PROCEDURE — 3078F DIAST BP <80 MM HG: CPT | Performed by: PHYSICIAN ASSISTANT

## 2020-06-30 PROCEDURE — 87505 NFCT AGENT DETECTION GI: CPT

## 2020-06-30 PROCEDURE — 1036F TOBACCO NON-USER: CPT | Performed by: PHYSICIAN ASSISTANT

## 2020-06-30 PROCEDURE — 83993 ASSAY FOR CALPROTECTIN FECAL: CPT

## 2020-06-30 PROCEDURE — 4040F PNEUMOC VAC/ADMIN/RCVD: CPT | Performed by: PHYSICIAN ASSISTANT

## 2020-06-30 PROCEDURE — 99214 OFFICE O/P EST MOD 30 MIN: CPT | Performed by: PHYSICIAN ASSISTANT

## 2020-06-30 PROCEDURE — 1160F RVW MEDS BY RX/DR IN RCRD: CPT | Performed by: PHYSICIAN ASSISTANT

## 2020-06-30 PROCEDURE — 3008F BODY MASS INDEX DOCD: CPT | Performed by: PHYSICIAN ASSISTANT

## 2020-06-30 PROCEDURE — 3074F SYST BP LT 130 MM HG: CPT | Performed by: PHYSICIAN ASSISTANT

## 2020-06-30 RX ORDER — ESCITALOPRAM OXALATE 20 MG/1
20 TABLET ORAL DAILY
Qty: 30 TABLET | Refills: 0 | Status: SHIPPED | OUTPATIENT
Start: 2020-06-30 | End: 2020-08-05 | Stop reason: SDUPTHER

## 2020-06-30 RX ORDER — ALPRAZOLAM 0.25 MG/1
0.25 TABLET ORAL
Qty: 30 TABLET | Refills: 0 | Status: SHIPPED | OUTPATIENT
Start: 2020-06-30 | End: 2020-07-27 | Stop reason: SDUPTHER

## 2020-07-01 LAB
C DIFF TOX GENS STL QL NAA+PROBE: NEGATIVE
CAMPYLOBACTER DNA SPEC NAA+PROBE: NORMAL
SALMONELLA DNA SPEC QL NAA+PROBE: NORMAL
SHIGA TOXIN STX GENE SPEC NAA+PROBE: NORMAL
SHIGELLA DNA SPEC QL NAA+PROBE: NORMAL

## 2020-07-02 DIAGNOSIS — K50.911 CROHN'S DISEASE WITH RECTAL BLEEDING, UNSPECIFIED GASTROINTESTINAL TRACT LOCATION (HCC): ICD-10-CM

## 2020-07-02 LAB — CALPROTECTIN STL-MCNT: 2238 UG/G (ref 0–120)

## 2020-07-02 PROCEDURE — U0003 INFECTIOUS AGENT DETECTION BY NUCLEIC ACID (DNA OR RNA); SEVERE ACUTE RESPIRATORY SYNDROME CORONAVIRUS 2 (SARS-COV-2) (CORONAVIRUS DISEASE [COVID-19]), AMPLIFIED PROBE TECHNIQUE, MAKING USE OF HIGH THROUGHPUT TECHNOLOGIES AS DESCRIBED BY CMS-2020-01-R: HCPCS

## 2020-07-07 ENCOUNTER — ANESTHESIA (OUTPATIENT)
Dept: PERIOP | Facility: HOSPITAL | Age: 75
End: 2020-07-07

## 2020-07-07 ENCOUNTER — HOSPITAL ENCOUNTER (OUTPATIENT)
Dept: PERIOP | Facility: HOSPITAL | Age: 75
Setting detail: OUTPATIENT SURGERY
Discharge: HOME | End: 2020-07-07
Admitting: INTERNAL MEDICINE
Payer: MEDICARE

## 2020-07-07 ENCOUNTER — ANESTHESIA EVENT (OUTPATIENT)
Dept: PERIOP | Facility: HOSPITAL | Age: 75
End: 2020-07-07

## 2020-07-07 ENCOUNTER — TELEPHONE (OUTPATIENT)
Dept: GASTROENTEROLOGY | Facility: CLINIC | Age: 75
End: 2020-07-07

## 2020-07-07 VITALS
BODY MASS INDEX: 23 KG/M2 | HEIGHT: 62 IN | HEART RATE: 72 BPM | RESPIRATION RATE: 20 BRPM | DIASTOLIC BLOOD PRESSURE: 70 MMHG | OXYGEN SATURATION: 96 % | TEMPERATURE: 97.4 F | WEIGHT: 125 LBS | SYSTOLIC BLOOD PRESSURE: 126 MMHG

## 2020-07-07 DIAGNOSIS — K50.911 CROHN'S DISEASE WITH RECTAL BLEEDING, UNSPECIFIED GASTROINTESTINAL TRACT LOCATION (HCC): ICD-10-CM

## 2020-07-07 LAB — SARS-COV-2 RNA RESP QL NAA+PROBE: NEGATIVE

## 2020-07-07 PROCEDURE — 87635 SARS-COV-2 COVID-19 AMP PRB: CPT | Performed by: INTERNAL MEDICINE

## 2020-07-07 PROCEDURE — 88305 TISSUE EXAM BY PATHOLOGIST: CPT | Performed by: PATHOLOGY

## 2020-07-07 PROCEDURE — 45380 COLONOSCOPY AND BIOPSY: CPT | Performed by: INTERNAL MEDICINE

## 2020-07-07 RX ORDER — PROPOFOL 10 MG/ML
INJECTION, EMULSION INTRAVENOUS CONTINUOUS PRN
Status: DISCONTINUED | OUTPATIENT
Start: 2020-07-07 | End: 2020-07-07 | Stop reason: SURG

## 2020-07-07 RX ORDER — MESALAMINE 1.2 G/1
4800 TABLET, DELAYED RELEASE ORAL
Qty: 120 TABLET | Refills: 3 | Status: SHIPPED | OUTPATIENT
Start: 2020-07-07 | End: 2020-08-06

## 2020-07-07 RX ORDER — SODIUM CHLORIDE, SODIUM LACTATE, POTASSIUM CHLORIDE, CALCIUM CHLORIDE 600; 310; 30; 20 MG/100ML; MG/100ML; MG/100ML; MG/100ML
50 INJECTION, SOLUTION INTRAVENOUS CONTINUOUS
Status: DISCONTINUED | OUTPATIENT
Start: 2020-07-07 | End: 2020-07-11 | Stop reason: HOSPADM

## 2020-07-07 RX ORDER — PROPOFOL 10 MG/ML
INJECTION, EMULSION INTRAVENOUS AS NEEDED
Status: DISCONTINUED | OUTPATIENT
Start: 2020-07-07 | End: 2020-07-07 | Stop reason: SURG

## 2020-07-07 RX ORDER — SODIUM CHLORIDE, SODIUM LACTATE, POTASSIUM CHLORIDE, CALCIUM CHLORIDE 600; 310; 30; 20 MG/100ML; MG/100ML; MG/100ML; MG/100ML
125 INJECTION, SOLUTION INTRAVENOUS CONTINUOUS
Status: CANCELLED | OUTPATIENT
Start: 2020-07-07

## 2020-07-07 RX ORDER — ALBUTEROL SULFATE 2.5 MG/3ML
2.5 SOLUTION RESPIRATORY (INHALATION) ONCE AS NEEDED
Status: DISCONTINUED | OUTPATIENT
Start: 2020-07-07 | End: 2020-07-11 | Stop reason: HOSPADM

## 2020-07-07 RX ORDER — ONDANSETRON 2 MG/ML
4 INJECTION INTRAMUSCULAR; INTRAVENOUS ONCE AS NEEDED
Status: DISCONTINUED | OUTPATIENT
Start: 2020-07-07 | End: 2020-07-11 | Stop reason: HOSPADM

## 2020-07-07 RX ADMIN — PHENYLEPHRINE HYDROCHLORIDE 100 MCG: 10 INJECTION INTRAVENOUS at 11:09

## 2020-07-07 RX ADMIN — PHENYLEPHRINE HYDROCHLORIDE 200 MCG: 10 INJECTION INTRAVENOUS at 11:22

## 2020-07-07 RX ADMIN — PROPOFOL 120 MCG/KG/MIN: 10 INJECTION, EMULSION INTRAVENOUS at 10:58

## 2020-07-07 RX ADMIN — PHENYLEPHRINE HYDROCHLORIDE 100 MCG: 10 INJECTION INTRAVENOUS at 11:01

## 2020-07-07 RX ADMIN — SODIUM CHLORIDE, SODIUM LACTATE, POTASSIUM CHLORIDE, AND CALCIUM CHLORIDE 50 ML/HR: .6; .31; .03; .02 INJECTION, SOLUTION INTRAVENOUS at 09:20

## 2020-07-07 RX ADMIN — PHENYLEPHRINE HYDROCHLORIDE 200 MCG: 10 INJECTION INTRAVENOUS at 11:04

## 2020-07-07 RX ADMIN — PROPOFOL 80 MG: 10 INJECTION, EMULSION INTRAVENOUS at 10:58

## 2020-07-07 NOTE — TELEPHONE ENCOUNTER
----- Message from Poornima Jain PA-C sent at 7/7/2020  1:01 PM EDT -----  Calprotectin is significantly elevated consistent w/ active disease as seen on colonoscopy today  F/u w/ Dr Vanessa Benitez in office in 2-3 weeks   Radha Robb

## 2020-07-07 NOTE — TELEPHONE ENCOUNTER
Called and talked to patient gave results, I put recall in for a follow up with Dr Sarah Up  (only) patient expressed understanding

## 2020-07-07 NOTE — INTERVAL H&P NOTE
H&P reviewed  After examining the patient I find no changes in the patients condition since the H&P had been written      Vitals:    07/07/20 0910   BP: 126/78   Pulse: 70   Resp: 20   Temp: (!) 96 9 °F (36 1 °C)   SpO2: 98%

## 2020-07-08 LAB — SARS-COV-2 RNA SPEC QL NAA+PROBE: NOT DETECTED

## 2020-07-10 ENCOUNTER — TELEPHONE (OUTPATIENT)
Dept: GASTROENTEROLOGY | Facility: AMBULARY SURGERY CENTER | Age: 75
End: 2020-07-10

## 2020-07-10 DIAGNOSIS — K50.911 CROHN'S DISEASE WITH RECTAL BLEEDING, UNSPECIFIED GASTROINTESTINAL TRACT LOCATION (HCC): Primary | ICD-10-CM

## 2020-07-10 RX ORDER — MESALAMINE 800 MG/1
1600 TABLET, DELAYED RELEASE ORAL 3 TIMES DAILY
Qty: 180 TABLET | Refills: 2 | Status: SHIPPED | OUTPATIENT
Start: 2020-07-10 | End: 2020-08-09

## 2020-07-10 NOTE — TELEPHONE ENCOUNTER
Patients GI provider:  Dr Tejas Lee    Number to return call: (   [phone]    Reason for call: Pt was prescribed lialda but it is on back order   IS there an alternate medication until this in stock    Scheduled procedure/appointment date if applicable: Apt/procedure  na

## 2020-07-10 NOTE — TELEPHONE ENCOUNTER
Message forwarded to provider to make aware Lialda order 7/7/20 on back order     Patient requesting substitute medication to [address] [place] [address],[address] [facility]

## 2020-07-14 ENCOUNTER — OFFICE VISIT (OUTPATIENT)
Dept: FAMILY MEDICINE CLINIC | Facility: CLINIC | Age: 75
End: 2020-07-14
Payer: MEDICARE

## 2020-07-14 VITALS
OXYGEN SATURATION: 96 % | HEART RATE: 65 BPM | HEIGHT: 62 IN | SYSTOLIC BLOOD PRESSURE: 136 MMHG | TEMPERATURE: 97.3 F | DIASTOLIC BLOOD PRESSURE: 68 MMHG | WEIGHT: 125 LBS | BODY MASS INDEX: 23 KG/M2

## 2020-07-14 DIAGNOSIS — K50.911 CROHN'S DISEASE WITH RECTAL BLEEDING, UNSPECIFIED GASTROINTESTINAL TRACT LOCATION (HCC): ICD-10-CM

## 2020-07-14 DIAGNOSIS — R63.4 WEIGHT LOSS, UNINTENTIONAL: ICD-10-CM

## 2020-07-14 DIAGNOSIS — F32.1 CURRENT MODERATE EPISODE OF MAJOR DEPRESSIVE DISORDER WITHOUT PRIOR EPISODE (HCC): Primary | ICD-10-CM

## 2020-07-14 PROCEDURE — 3008F BODY MASS INDEX DOCD: CPT | Performed by: PHYSICIAN ASSISTANT

## 2020-07-14 PROCEDURE — 3075F SYST BP GE 130 - 139MM HG: CPT | Performed by: PHYSICIAN ASSISTANT

## 2020-07-14 PROCEDURE — 3078F DIAST BP <80 MM HG: CPT | Performed by: PHYSICIAN ASSISTANT

## 2020-07-14 PROCEDURE — 99213 OFFICE O/P EST LOW 20 MIN: CPT | Performed by: PHYSICIAN ASSISTANT

## 2020-07-14 PROCEDURE — 1036F TOBACCO NON-USER: CPT | Performed by: PHYSICIAN ASSISTANT

## 2020-07-14 PROCEDURE — 1160F RVW MEDS BY RX/DR IN RCRD: CPT | Performed by: PHYSICIAN ASSISTANT

## 2020-07-14 PROCEDURE — 4040F PNEUMOC VAC/ADMIN/RCVD: CPT | Performed by: PHYSICIAN ASSISTANT

## 2020-07-14 NOTE — PROGRESS NOTES
Assessment/Plan:    Problem List Items Addressed This Visit        Digestive    Crohn's disease with rectal bleeding ([facility] )       Other    Depression - Primary    Weight loss, unintentional           Diagnoses and all orders for this visit:    Current moderate episode of major depressive disorder without prior episode ([facility] )    Crohn's disease with rectal bleeding, unspecified gastrointestinal tract location ([facility] )    Weight loss, unintentional        Continue same medications, follow up in 2 weeks or sooner PRN  Subjective:      Patient ID: Catrina Kwong is a 76 y o  male  Annabel Velezr returns today for follow up  Fortunately, has been holding his weight steady at 125 lbs  He was started back on Lialda through GI, feels his Crohn's is starting to improve, no longer having blood in his stool  He is supposed to be having follow up with GI, states they will call him  His wife is staying at [employer] on the 5th floor, states she will not be coming home  She now has a feeding tube and is declining mentally  He has not had to cook for himself before and is trying to adjust to live at home on his own which is very stressful  He is sleeping better with Xanax at bedtime  The following portions of the patient's history were reviewed and updated as appropriate:   He has a past medical history of Acute gouty arthropathy, Arthritis, Bacterial pneumonia, Depression, Fracture of rib, closed, Gout, Hyperlipidemia, Hypertension, and Melanoma ([facility] )  ,  does not have any pertinent problems on file  ,   has a past surgical history that includes Eye surgery; Malignant skin lesion excision; pr shldr arthroscop,lyse adhesns (Right, 8/28/2017); and Mohs surgery  ,  family history includes Aneurysm in his father; Aortic aneurysm in his family; Cancer in his mother; No Known Problems in his sister  ,   reports that he has never smoked  He has never used smokeless tobacco  He reports that he drank alcohol   He reports that he does not use drugs  ,  has No Known Allergies     Current Outpatient Medications   Medication Sig Dispense Refill    ALPRAZolam (XANAX) 0 25 mg tablet Take 1 tablet (0 25 mg total) by mouth daily at bedtime as needed for anxiety or sleep 30 tablet 0    cyanocobalamin (VITAMIN B-12) 1,000 mcg tablet Take 1,000 mcg by mouth daily   escitalopram (LEXAPRO) 20 mg tablet Take 1 tablet (20 mg total) by mouth daily 30 tablet 0    losartan (COZAAR) 100 MG tablet Take 1 tablet (100 mg total) by mouth daily 90 tablet 1    mesalamine (ASACOL) 800 MG EC tablet Take 2 tablets (1,600 mg total) by mouth 3 (three) times a day 180 tablet 2    mesalamine (LIALDA) 1 2 g EC tablet Take 4 tablets (4 8 g total) by mouth daily with breakfast 120 tablet 3    Misc Natural Products (OSTEO BI-FLEX ADV TRIPLE ST PO) Take by mouth      VITAMIN D, CHOLECALCIFEROL, PO Take 2,000 Units by mouth daily  No current facility-administered medications for this visit  Review of Systems   Constitutional: Positive for unexpected weight change  Negative for activity change, appetite change, chills, diaphoresis, fatigue and fever  HENT: Negative for congestion, ear pain, postnasal drip, rhinorrhea, sinus pressure, sinus pain, sneezing, sore throat, tinnitus and voice change  Eyes: Negative for pain, redness and visual disturbance  Respiratory: Negative for cough, chest tightness, shortness of breath and wheezing  Cardiovascular: Negative for chest pain, palpitations and leg swelling  Gastrointestinal: Negative for abdominal pain, blood in stool, constipation, diarrhea, nausea and vomiting  Genitourinary: Negative for difficulty urinating, dysuria, frequency, hematuria and urgency  Musculoskeletal: Negative for arthralgias, back pain, gait problem, joint swelling, myalgias, neck pain and neck stiffness  Skin: Negative for color change, pallor, rash and wound     Neurological: Negative for dizziness, tremors, weakness, light-headedness and headaches  Psychiatric/Behavioral: Positive for dysphoric mood and sleep disturbance  Negative for self-injury and suicidal ideas  The patient is nervous/anxious  Objective:  Vitals:    07/14/20 0736   BP: 136/68   Pulse: 65   Temp: (!) 97 3 °F (36 3 °C)   SpO2: 96%   Weight: 56 7 kg (125 lb)   Height: 5' 2" (1 575 m)     Body mass index is 22 86 kg/m²  Physical Exam   Constitutional: He is oriented to person, place, and time  He appears well-developed  No distress  HENT:   Head: Normocephalic and atraumatic  Right Ear: Hearing, tympanic membrane, external ear and ear canal normal    Left Ear: Hearing, tympanic membrane, external ear and ear canal normal    Mouth/Throat: Uvula is midline, oropharynx is clear and moist and mucous membranes are normal  No oropharyngeal exudate  Eyes: Conjunctivae are normal  Right eye exhibits no discharge  Left eye exhibits no discharge  No scleral icterus  Neck: Neck supple  Carotid bruit is not present  No thyromegaly present  Cardiovascular: Normal rate, regular rhythm and normal heart sounds  No murmur heard  Pulmonary/Chest: Effort normal and breath sounds normal  No respiratory distress  He has no wheezes  Abdominal: Soft  Bowel sounds are normal  He exhibits no distension and no mass  There is no tenderness  There is no rebound and no guarding  Musculoskeletal: Normal range of motion  He exhibits no edema or tenderness  Lymphadenopathy:     He has no cervical adenopathy  Neurological: He is alert and oriented to person, place, and time  Skin: Skin is warm and dry  No rash noted  He is not diaphoretic  No erythema  Psychiatric: His speech is normal and behavior is normal  Judgment and thought content normal  His mood appears anxious  Cognition and memory are normal  He exhibits a depressed mood  Vitals reviewed

## 2020-07-15 ENCOUNTER — TELEPHONE (OUTPATIENT)
Dept: GASTROENTEROLOGY | Facility: CLINIC | Age: 75
End: 2020-07-15

## 2020-07-15 NOTE — TELEPHONE ENCOUNTER
Appointment scheduled      ----- Message from Jermaine Gillespie sent at 7/7/2020  2:14 PM EDT -----  Regarding: please schedule   Please schedule patient for a follow up from Colonoscopy on 7/7/2020 in [id number] with Dr Homero Santana (only)

## 2020-07-25 NOTE — PROGRESS NOTES
Chief Complaint  Right shoulder pain    History Of Presenting Illness  Kurt Whyte 1945 presents with right shoulder pain  Patient had arthroscopic debridement for grade 4 osteoarthritis right shoulder August 28th,2017  Patient has regained excellent range of motion of the shoulder  His main complaint is an aching discomfort at night  Patient has no pain during the day  Patient able to do activities of daily living      Current Medications  Current Outpatient Prescriptions   Medication Sig Dispense Refill    aspirin 81 MG tablet Take 1 tablet by mouth daily      cyanocobalamin (VITAMIN B-12) 1,000 mcg tablet Take 1,000 mcg by mouth daily   losartan (COZAAR) 50 mg tablet Take 1 tablet (50 mg total) by mouth daily 90 tablet 1    Misc Natural Products (OSTEO BI-FLEX ADV TRIPLE ST PO) Take by mouth      VITAMIN D, CHOLECALCIFEROL, PO Take 2,000 Units by mouth daily  No current facility-administered medications for this visit          Current Problems    Active Problems:   Patient Active Problem List    Diagnosis Date Noted    Primary osteoarthritis of first carpometacarpal joint of right hand 11/08/2017    Arthritis 10/07/2017    Rotator cuff tear arthropathy of right shoulder 08/28/2017    Primary osteoarthritis of both shoulders 06/16/2017    Dyslipidemia 10/28/2014    Depression 09/27/2012    Malignant melanoma of skin ([facility] [address] ) 09/27/2012    Erectile dysfunction of non-organic origin 05/21/2012    Hypertension 05/18/2012         Review of Systems:    General: negative for - chills, fatigue, fever,  weight gain or weight loss  Psychological: negative for - anxiety, behavioral disorder, concentration difficulties      Past Medical History:   Past Medical History:   Diagnosis Date    Acute gouty arthropathy     Last Assessed: 6/9/2015    Arthritis     Bacterial pneumonia     Last Assessed: 6/21/2016    Depression     Fracture of rib, closed     Last Assessed: 6/21/2016    Pt to ultrasound   Gout     Hyperlipidemia     Hypertension     Melanoma ([facility] [address] )        Past Surgical History:   Past Surgical History:   Procedure Laterality Date    EYE SURGERY      MALIGNANT SKIN LESION EXCISION      MOHS SURGERY      shaving of lesion moh's technique     [place] SHLDR ARTHROSCOP,LYSE ADHESNS Right 8/28/2017    Procedure: ARTHROSCOPY [facility] UNDER GENERAL ANESTHESIA ,LYSIS OF ADHESIONS, CAPSULECTOMY;  Surgeon: Shakila Colón MD;  Location: [facility];  Service: Orthopedics       Family History:  Family history reviewed and non-contributory  Family History   Problem Relation Age of Onset    Cancer Mother     Aneurysm Father     No Known Problems Sister     Aortic aneurysm Family        Social History:  Social History     Social History    Marital status: /Civil Union     Spouse name: N/A    Number of children: N/A    Years of education: N/A     Occupational History    Retired       Social History Main Topics    Smoking status: Never Smoker    Smokeless tobacco: Never Used    Alcohol use Yes      Comment: rare, being a social drinker     Drug use: No    Sexual activity: Yes     Other Topics Concern    None     Social History Narrative    None       Allergies:   No Known Allergies        Physical ExaminationBP 126/75   Pulse 80   Ht 5' 5" (1 651 m)   Wt 64 4 kg (142 lb)   BMI 23 63 kg/m²   Gen: Alert and oriented to person, place, time  HEENT: EOMI, eyes clear, moist mucus membranes, hearing intact      Orthopedic Exam  Right shoulder no obvious swelling or deformity  Follow-up flexion 150  AB duction 140  Extension 30  Rotation full  External rotation 5°  Radiographs confirm severe DJD of the right shoulder          Impression  DJD right shoulder status post arthroscopic capsular release          Plan    Discussed treatment with the patient  Patient not keen on a shoulder replacement at present  Injected right shoulder with steroid and local anesthetic with excellent temporary relief of symptoms  Patient will continue home exercise program  Follow-up in 4 months x-ray right shoulder AP axillary on arrival  Large joint arthrocentesis  Date/Time: 12/7/2018 9:03 AM  Consent given by: patient  Site marked: site marked  Timeout: Immediately prior to procedure a time out was called to verify the correct patient, procedure, equipment, support staff and site/side marked as required   Supporting Documentation  Indications: pain and diagnostic evaluation   Procedure Details  Location: shoulder - R subacromial bursa  Preparation: Patient was prepped and draped in the usual sterile fashion  Needle size: 22 G  Ultrasound guidance: no  Approach: posterolateral  Medications administered: 6 mL bupivacaine (PF) 0 5 %; 12 mg betamethasone acetate-betamethasone sodium phosphate 6 (3-3) mg/mL    Patient tolerance: patient tolerated the procedure well with no immediate complications  Dressing:  Sterile dressing applied        Mis Antonio MD        Portions of the record may have been created with voice recognition software   Occasional wrong word or "sound a like" substitutions may have occurred due to the inherent limitations of voice recognition software   Read the chart carefully and recognize, using context, where substitutions have occurred

## 2020-07-27 DIAGNOSIS — F51.05 INSOMNIA SECONDARY TO ANXIETY: ICD-10-CM

## 2020-07-27 DIAGNOSIS — F41.9 INSOMNIA SECONDARY TO ANXIETY: ICD-10-CM

## 2020-07-27 RX ORDER — ALPRAZOLAM 0.25 MG/1
0.25 TABLET ORAL
Qty: 30 TABLET | Refills: 0 | Status: SHIPPED | OUTPATIENT
Start: 2020-07-27 | End: 2020-08-26 | Stop reason: SDUPTHER

## 2020-07-27 NOTE — TELEPHONE ENCOUNTER
Controlled Substance Review    [place]MP or [place]  reviewed: No red flags were identified; safe to proceed with prescription  Romain Dumont     PDMP Review       Value Time User    PDMP Reviewed  Yes 7/27/2020  5:57 PM Martinez Cosme PA-C

## 2020-07-29 ENCOUNTER — TELEPHONE (OUTPATIENT)
Dept: GASTROENTEROLOGY | Facility: CLINIC | Age: 75
End: 2020-07-29

## 2020-07-29 NOTE — TELEPHONE ENCOUNTER
Called patient, as per chart states follow up with  Dr Rebecca Austin chart is completely overbooked until September  Patient prefers to come in with Anil 7/29/20 for a closer appointment

## 2020-07-30 ENCOUNTER — OFFICE VISIT (OUTPATIENT)
Dept: FAMILY MEDICINE CLINIC | Facility: CLINIC | Age: 75
End: 2020-07-30
Payer: MEDICARE

## 2020-07-30 VITALS
HEART RATE: 78 BPM | BODY MASS INDEX: 23.3 KG/M2 | TEMPERATURE: 96.8 F | OXYGEN SATURATION: 96 % | HEIGHT: 62 IN | DIASTOLIC BLOOD PRESSURE: 60 MMHG | WEIGHT: 126.6 LBS | SYSTOLIC BLOOD PRESSURE: 106 MMHG

## 2020-07-30 DIAGNOSIS — F32.1 CURRENT MODERATE EPISODE OF MAJOR DEPRESSIVE DISORDER WITHOUT PRIOR EPISODE (HCC): ICD-10-CM

## 2020-07-30 DIAGNOSIS — K50.911 CROHN'S DISEASE WITH RECTAL BLEEDING, UNSPECIFIED GASTROINTESTINAL TRACT LOCATION (HCC): ICD-10-CM

## 2020-07-30 DIAGNOSIS — F51.05 INSOMNIA SECONDARY TO ANXIETY: ICD-10-CM

## 2020-07-30 DIAGNOSIS — F41.9 INSOMNIA SECONDARY TO ANXIETY: ICD-10-CM

## 2020-07-30 DIAGNOSIS — Z63.4 SPOUSE DECEASED: Primary | ICD-10-CM

## 2020-07-30 DIAGNOSIS — I10 ESSENTIAL HYPERTENSION: ICD-10-CM

## 2020-07-30 PROCEDURE — 1160F RVW MEDS BY RX/DR IN RCRD: CPT | Performed by: PHYSICIAN ASSISTANT

## 2020-07-30 PROCEDURE — 3008F BODY MASS INDEX DOCD: CPT | Performed by: PHYSICIAN ASSISTANT

## 2020-07-30 PROCEDURE — 4040F PNEUMOC VAC/ADMIN/RCVD: CPT | Performed by: PHYSICIAN ASSISTANT

## 2020-07-30 PROCEDURE — 1036F TOBACCO NON-USER: CPT | Performed by: PHYSICIAN ASSISTANT

## 2020-07-30 PROCEDURE — 3074F SYST BP LT 130 MM HG: CPT | Performed by: PHYSICIAN ASSISTANT

## 2020-07-30 PROCEDURE — 99214 OFFICE O/P EST MOD 30 MIN: CPT | Performed by: PHYSICIAN ASSISTANT

## 2020-07-30 PROCEDURE — 3078F DIAST BP <80 MM HG: CPT | Performed by: PHYSICIAN ASSISTANT

## 2020-07-30 RX ORDER — LOSARTAN POTASSIUM 100 MG/1
50 TABLET ORAL DAILY
Qty: 90 TABLET | Refills: 1
Start: 2020-07-30 | End: 2020-07-31 | Stop reason: SDUPTHER

## 2020-07-30 RX ORDER — MULTIVITAMIN
1 TABLET ORAL DAILY
COMMUNITY

## 2020-07-30 SDOH — SOCIAL STABILITY - SOCIAL INSECURITY: DISSAPEARANCE AND DEATH OF FAMILY MEMBER: Z63.4

## 2020-07-30 NOTE — PROGRESS NOTES
Assessment/Plan:    Problem List Items Addressed This Visit        Cardiovascular and Mediastinum    Essential hypertension    Relevant Medications    losartan (COZAAR) 100 MG tablet       Other    Depression      Other Visit Diagnoses     Spouse     -  Primary    Insomnia secondary to anxiety               Diagnoses and all orders for this visit:    Spouse     Essential hypertension  -     losartan (COZAAR) 100 MG tablet; Take 0 5 tablets (50 mg total) by mouth daily    Current moderate episode of major depressive disorder without prior episode (HCC)    Insomnia secondary to anxiety    Other orders  -     Multiple Vitamin (MULTIVITAMIN) tablet; Take 1 tablet by mouth daily              Subjective:      Patient ID: Charli Anders is a 76 y o  male  Kasey Leisure returns today for follow up  Unfortunately, his wife, Esvin Durmmond, passed away 2020 due to an MI while in the hospital  She was cremated and buried on 2020  He does feel some relief as he knows she was suffering, but they were  for almost 52 years, and her birthday is coming up on  which will be a difficult time for him  He does have a good support system in his daughter and friends  He is eating and sleeping ok, he actually gained a pound, almost 2 lbs since last OV  Today, his BP is low, he does note some dizziness when standing  He admits to using less salt, eating less salty foods  He has not been working outside in the heat so is not sweating, does not feel that he is dehydrated  I did encourage him to drink more water during the day, will cut his losartan tablet in half so will try taking only 50 mg per day  He has a telephone visit scheduled with GI in the next week to follow up for his Crohns, states he is doing much better since starting medication         The following portions of the patient's history were reviewed and updated as appropriate:   He has a past medical history of Acute gouty arthropathy, Arthritis, Bacterial pneumonia, Depression, Fracture of rib, closed, Gout, Hyperlipidemia, Hypertension, and Melanoma ([facility] [address] )  ,  does not have any pertinent problems on file  ,   has a past surgical history that includes Eye surgery; Malignant skin lesion excision; pr shldr arthroscop,sky benavides (Right, 8/28/2017); and Mohs surgery  ,  family history includes Aneurysm in his father; Aortic aneurysm in his family; Cancer in his mother; No Known Problems in his sister  ,   reports that he has never smoked  He has never used smokeless tobacco  He reports that he drank alcohol  He reports that he does not use drugs  ,  has No Known Allergies     Current Outpatient Medications   Medication Sig Dispense Refill    ALPRAZolam (XANAX) 0 25 mg tablet Take 1 tablet (0 25 mg total) by mouth daily at bedtime as needed for anxiety or sleep 30 tablet 0    cyanocobalamin (VITAMIN B-12) 1,000 mcg tablet Take 1,000 mcg by mouth daily   escitalopram (LEXAPRO) 20 mg tablet Take 1 tablet (20 mg total) by mouth daily 30 tablet 0    losartan (COZAAR) 100 MG tablet Take 0 5 tablets (50 mg total) by mouth daily 90 tablet 1    mesalamine (ASACOL) 800 MG EC tablet Take 2 tablets (1,600 mg total) by mouth 3 (three) times a day 180 tablet 2    mesalamine (LIALDA) 1 2 g EC tablet Take 4 tablets (4 8 g total) by mouth daily with breakfast 120 tablet 3    Misc Natural Products (OSTEO BI-FLEX ADV TRIPLE ST PO) Take by mouth      Multiple Vitamin (MULTIVITAMIN) tablet Take 1 tablet by mouth daily      VITAMIN D, CHOLECALCIFEROL, PO Take 2,000 Units by mouth daily  No current facility-administered medications for this visit  Review of Systems   Constitutional: Negative for activity change, appetite change, chills, diaphoresis, fatigue, fever and unexpected weight change  HENT: Negative for congestion, ear pain, postnasal drip, rhinorrhea, sinus pressure, sinus pain, sneezing, sore throat, tinnitus and voice change      Eyes: Negative for pain, redness and visual disturbance  Respiratory: Negative for cough, chest tightness, shortness of breath and wheezing  Cardiovascular: Negative for chest pain, palpitations and leg swelling  Gastrointestinal: Negative for abdominal pain, blood in stool, constipation, diarrhea, nausea and vomiting  Genitourinary: Negative for difficulty urinating, dysuria, frequency, hematuria and urgency  Musculoskeletal: Negative for arthralgias, back pain, gait problem, joint swelling, myalgias, neck pain and neck stiffness  Skin: Negative for color change, pallor, rash and wound  Neurological: Negative for dizziness, tremors, weakness, light-headedness and headaches  Psychiatric/Behavioral: Positive for dysphoric mood  Negative for self-injury, sleep disturbance and suicidal ideas  The patient is not nervous/anxious  Objective:  Vitals:    07/30/20 0924   BP: 106/60   Pulse: 78   Temp: (!) 96 8 °F (36 °C)   SpO2: 96%   Weight: 57 4 kg (126 lb 9 6 oz)   Height: 5' 2" (1 575 m)     Body mass index is 23 16 kg/m²  Physical Exam   Constitutional: He is oriented to person, place, and time  He appears well-developed and well-nourished  No distress  HENT:   Head: Normocephalic and atraumatic  Right Ear: Hearing, tympanic membrane, external ear and ear canal normal    Left Ear: Hearing, tympanic membrane, external ear and ear canal normal    Mouth/Throat: Uvula is midline, oropharynx is clear and moist and mucous membranes are normal  No oropharyngeal exudate  Eyes: Conjunctivae are normal  Right eye exhibits no discharge  Left eye exhibits no discharge  No scleral icterus  Neck: Neck supple  Carotid bruit is not present  No thyromegaly present  Cardiovascular: Normal rate, regular rhythm and normal heart sounds  No murmur heard  Pulmonary/Chest: Effort normal and breath sounds normal  No respiratory distress  He has no wheezes  Abdominal: Soft   Bowel sounds are normal  He exhibits no distension and no mass  There is no tenderness  There is no rebound and no guarding  Musculoskeletal: Normal range of motion  He exhibits no edema or tenderness  Lymphadenopathy:     He has no cervical adenopathy  Neurological: He is alert and oriented to person, place, and time  Skin: Skin is warm and dry  No rash noted  He is not diaphoretic  No erythema  Psychiatric: He has a normal mood and affect  His behavior is normal  Judgment and thought content normal    Vitals reviewed

## 2020-07-31 DIAGNOSIS — I10 ESSENTIAL HYPERTENSION: ICD-10-CM

## 2020-08-02 RX ORDER — LOSARTAN POTASSIUM 100 MG/1
50 TABLET ORAL DAILY
Qty: 90 TABLET | Refills: 1 | Status: SHIPPED | OUTPATIENT
Start: 2020-08-02 | End: 2021-01-14 | Stop reason: SDUPTHER

## 2020-08-03 ENCOUNTER — TELEMEDICINE (OUTPATIENT)
Dept: GASTROENTEROLOGY | Facility: MEDICAL CENTER | Age: 75
End: 2020-08-03
Payer: MEDICARE

## 2020-08-03 DIAGNOSIS — K50.911 CROHN'S DISEASE WITH RECTAL BLEEDING, UNSPECIFIED GASTROINTESTINAL TRACT LOCATION (HCC): Primary | ICD-10-CM

## 2020-08-03 PROCEDURE — 99443 PR PHYS/QHP TELEPHONE EVALUATION 21-30 MIN: CPT | Performed by: INTERNAL MEDICINE

## 2020-08-03 PROCEDURE — 4040F PNEUMOC VAC/ADMIN/RCVD: CPT | Performed by: INTERNAL MEDICINE

## 2020-08-03 PROCEDURE — 1036F TOBACCO NON-USER: CPT | Performed by: INTERNAL MEDICINE

## 2020-08-03 PROCEDURE — 3074F SYST BP LT 130 MM HG: CPT | Performed by: INTERNAL MEDICINE

## 2020-08-03 PROCEDURE — 3078F DIAST BP <80 MM HG: CPT | Performed by: INTERNAL MEDICINE

## 2020-08-03 RX ORDER — SULFASALAZINE 500 MG/1
1000 TABLET, DELAYED RELEASE ORAL 2 TIMES DAILY
Qty: 120 TABLET | Refills: 3 | Status: SHIPPED | OUTPATIENT
Start: 2020-08-03 | End: 2021-05-18

## 2020-08-03 NOTE — PROGRESS NOTES
Virtual Regular Visit      Assessment/Plan:  77 yo man with IBD (Crohn's vs UC proctosigmoiditis), with symptoms currently improved  He was prescribed asacol after recent colonoscopy but it is too costly and the number of pills too cumbersome  Overall, his symptoms have improved and he is in clinical remission, although endoscopically his disease is mild-moderately active  Given his clinical remission, will try to get his disease under control with sulfasalazine  Can consider short course of budesonide if he flares  No need for biologic at this time, but can consider Entyvio or Stelara should he need one  Available labs and imaging reviewed         Problem List Items Addressed This Visit        Digestive    Crohn's disease with rectal bleeding ([facility] [address] ) - Primary    Relevant Medications    sulfaSALAzine (AZULFIDINE-EN) 500 mg EC tablet    Other Relevant Orders    CBC and differential    Comprehensive metabolic panel    C-reactive protein    Ferritin    Iron    Transferrin    Vitamin B12    Vitamin D 25 hydroxy          Stop asacol and will start sulfasalazine 1000mg BID  CBC, CMP, CRP  Iron studies, vitamin B12 and D  Small bowel imaging in the future  Yearly flu shot  Routine dermatologic, ophthalmologic and dental exam  DEXA scan in the future  Avoid NSAIDs, tobacco/smoking  Repeat colonoscopy in 1 year  Further recs pending above         Reason for visit is   Chief Complaint   Patient presents with    Virtual Regular Visit        Encounter provider Balbina Bryant MD    Provider located at [address] [id number]      Recent Visits  Date Type Provider Dept   07/30/20 Office Visit Pat Madrigal PA-C [facility]   Showing recent visits within past 7 days and meeting all other requirements     Today's Visits  Date Type Provider Dept   08/03/20 Telemedicine Balbina Bryant MD [facility]   Showing today's visits and meeting all other requirements     Future Appointments  No visits were found meeting these conditions  Showing future appointments within next 150 days and meeting all other requirements        The patient was identified by name and date of birth  Perlita Dawson was informed that this is a telemedicine visit and that the visit is being conducted through telephone  My office door was closed  No one else was in the room  He acknowledged consent and understanding of privacy and security of the video platform  The patient has agreed to participate and understands they can discontinue the visit at any time  Patient is aware this is a billable service  Subjective  Perlita Dawson is a 76 y o  male with IBD who presents for follow-up  His wife passed away on July 14th  He started on mesalamine (Asacol) but the number of tabs felt too cumbersome  It was also very expensive  His BMs are formed, once a day  When he eats breakfast and has a cup of coffee he will have a BM, and he is good for the rest of the evening  He does not have nocturnal BMs  No abdominal pain or rectal pain  No BRBPR or black stools  No heartburn, dysphagia, odynophagia, nausea, vomiting  He lost weight but attributes this to the death of his wife (15 lbs over 2 months, gradually)It was my intent to perform this visit via video technology but the patient was not able to do a video connection so the visit was completed via audio telephone only  No fevers, chills, rashes, mouth sores  He has osteoarthritis           Past Medical History:   Diagnosis Date    Acute gouty arthropathy     Last Assessed: 6/9/2015    Arthritis     Bacterial pneumonia     Last Assessed: 6/21/2016    Depression     Fracture of rib, closed     Last Assessed: 6/21/2016    Gout     Hyperlipidemia     Hypertension     Melanoma ([facility] [address] )        Past Surgical History:   Procedure Laterality Date    EYE SURGERY      MALIGNANT SKIN LESION EXCISION      MOHS SURGERY      shaving of lesion moh's technique     [place] SHLDR ARTHROSCOP,RANDOLPHE SANTIAGO Right 8/28/2017    Procedure: ARTHROSCOPY [facility] UNDER GENERAL ANESTHESIA ,LYSIS OF ADHESIONS, CAPSULECTOMY;  Surgeon: Rose Marie Marie MD;  Location: [facility];  Service: Orthopedics       Current Outpatient Medications   Medication Sig Dispense Refill    ALPRAZolam (XANAX) 0 25 mg tablet Take 1 tablet (0 25 mg total) by mouth daily at bedtime as needed for anxiety or sleep 30 tablet 0    cyanocobalamin (VITAMIN B-12) 1,000 mcg tablet Take 1,000 mcg by mouth daily   escitalopram (LEXAPRO) 20 mg tablet Take 1 tablet (20 mg total) by mouth daily 30 tablet 0    losartan (COZAAR) 100 MG tablet Take 0 5 tablets (50 mg total) by mouth daily 90 tablet 1    mesalamine (ASACOL) 800 MG EC tablet Take 2 tablets (1,600 mg total) by mouth 3 (three) times a day 180 tablet 2    mesalamine (LIALDA) 1 2 g EC tablet Take 4 tablets (4 8 g total) by mouth daily with breakfast 120 tablet 3    Misc Natural Products (OSTEO BI-FLEX ADV TRIPLE ST PO) Take by mouth      Multiple Vitamin (MULTIVITAMIN) tablet Take 1 tablet by mouth daily      sulfaSALAzine (AZULFIDINE-EN) 500 mg EC tablet Take 2 tablets (1,000 mg total) by mouth 2 (two) times a day 120 tablet 3    VITAMIN D, CHOLECALCIFEROL, PO Take 2,000 Units by mouth daily  No current facility-administered medications for this visit  No Known Allergies    REVIEW OF SYSTEMS:    CONSTITUTIONAL: Denies any fever, chills, rigors, and weight loss  HEENT: No earache or tinnitus  Denies hearing loss or visual disturbances  CARDIOVASCULAR: No chest pain or palpitations  RESPIRATORY: Denies any cough, hemoptysis, shortness of breath or dyspnea on exertion  GASTROINTESTINAL: As noted in the History of Present Illness  GENITOURINARY: No problems with urination  Denies any hematuria or dysuria    NEUROLOGIC: No dizziness or vertigo, denies headaches  MUSCULOSKELETAL: Denies any muscle or joint pain  SKIN: Denies skin rashes or itching  ENDOCRINE: Denies excessive thirst  Denies intolerance to heat or cold  PSYCHOSOCIAL: Denies depression or anxiety  Denies any recent memory loss  PHYSICAL EXAMINATION:  Unable to perform physical examination given the unavailability of a video application  I spent 25 minutes directly with the patient during this visit      [address] acknowledges that he has consented to an online visit or consultation  He understands that the online visit is based solely on information provided by him, and that, in the absence of a face-to-face physical evaluation by the physician, the diagnosis he receives is both limited and provisional in terms of accuracy and completeness  This is not intended to replace a full medical face-to-face evaluation by the physician  Denisse Gu understands and accepts these terms

## 2020-08-05 ENCOUNTER — APPOINTMENT (OUTPATIENT)
Dept: LAB | Facility: MEDICAL CENTER | Age: 75
End: 2020-08-05
Payer: MEDICARE

## 2020-08-05 DIAGNOSIS — K50.911 CROHN'S DISEASE WITH RECTAL BLEEDING, UNSPECIFIED GASTROINTESTINAL TRACT LOCATION (HCC): ICD-10-CM

## 2020-08-05 DIAGNOSIS — F41.1 GAD (GENERALIZED ANXIETY DISORDER): ICD-10-CM

## 2020-08-05 LAB
25(OH)D3 SERPL-MCNC: 61.7 NG/ML (ref 30–100)
ALBUMIN SERPL BCP-MCNC: 2.7 G/DL (ref 3.5–5)
ALP SERPL-CCNC: 76 U/L (ref 46–116)
ALT SERPL W P-5'-P-CCNC: 25 U/L (ref 12–78)
ANION GAP SERPL CALCULATED.3IONS-SCNC: 3 MMOL/L (ref 4–13)
AST SERPL W P-5'-P-CCNC: 13 U/L (ref 5–45)
BASOPHILS # BLD AUTO: 0.04 THOUSANDS/ΜL (ref 0–0.1)
BASOPHILS NFR BLD AUTO: 1 % (ref 0–1)
BILIRUB SERPL-MCNC: 0.41 MG/DL (ref 0.2–1)
BUN SERPL-MCNC: 18 MG/DL (ref 5–25)
CALCIUM SERPL-MCNC: 9.4 MG/DL (ref 8.3–10.1)
CHLORIDE SERPL-SCNC: 110 MMOL/L (ref 100–108)
CO2 SERPL-SCNC: 31 MMOL/L (ref 21–32)
CREAT SERPL-MCNC: 0.87 MG/DL (ref 0.6–1.3)
CRP SERPL QL: <3 MG/L
EOSINOPHIL # BLD AUTO: 0.13 THOUSAND/ΜL (ref 0–0.61)
EOSINOPHIL NFR BLD AUTO: 2 % (ref 0–6)
ERYTHROCYTE [DISTWIDTH] IN BLOOD BY AUTOMATED COUNT: 13.9 % (ref 11.6–15.1)
FERRITIN SERPL-MCNC: 44 NG/ML (ref 8–388)
GFR SERPL CREATININE-BSD FRML MDRD: 84 ML/MIN/1.73SQ M
GLUCOSE P FAST SERPL-MCNC: 100 MG/DL (ref 65–99)
HCT VFR BLD AUTO: 38.5 % (ref 36.5–49.3)
HGB BLD-MCNC: 11.9 G/DL (ref 12–17)
IMM GRANULOCYTES # BLD AUTO: 0.05 THOUSAND/UL (ref 0–0.2)
IMM GRANULOCYTES NFR BLD AUTO: 1 % (ref 0–2)
IRON SERPL-MCNC: 54 UG/DL (ref 65–175)
LYMPHOCYTES # BLD AUTO: 1.12 THOUSANDS/ΜL (ref 0.6–4.47)
LYMPHOCYTES NFR BLD AUTO: 14 % (ref 14–44)
MCH RBC QN AUTO: 28.9 PG (ref 26.8–34.3)
MCHC RBC AUTO-ENTMCNC: 30.9 G/DL (ref 31.4–37.4)
MCV RBC AUTO: 93 FL (ref 82–98)
MONOCYTES # BLD AUTO: 0.72 THOUSAND/ΜL (ref 0.17–1.22)
MONOCYTES NFR BLD AUTO: 9 % (ref 4–12)
NEUTROPHILS # BLD AUTO: 5.99 THOUSANDS/ΜL (ref 1.85–7.62)
NEUTS SEG NFR BLD AUTO: 73 % (ref 43–75)
NRBC BLD AUTO-RTO: 0 /100 WBCS
PLATELET # BLD AUTO: 290 THOUSANDS/UL (ref 149–390)
PMV BLD AUTO: 8.5 FL (ref 8.9–12.7)
POTASSIUM SERPL-SCNC: 4.2 MMOL/L (ref 3.5–5.3)
PROT SERPL-MCNC: 6.6 G/DL (ref 6.4–8.2)
RBC # BLD AUTO: 4.12 MILLION/UL (ref 3.88–5.62)
SODIUM SERPL-SCNC: 144 MMOL/L (ref 136–145)
TRANSFERRIN SERPL-MCNC: 250 MG/DL (ref 200–400)
VIT B12 SERPL-MCNC: 1913 PG/ML (ref 100–900)
WBC # BLD AUTO: 8.05 THOUSAND/UL (ref 4.31–10.16)

## 2020-08-05 PROCEDURE — 86140 C-REACTIVE PROTEIN: CPT

## 2020-08-05 PROCEDURE — 84466 ASSAY OF TRANSFERRIN: CPT

## 2020-08-05 PROCEDURE — 82607 VITAMIN B-12: CPT

## 2020-08-05 PROCEDURE — 80053 COMPREHEN METABOLIC PANEL: CPT

## 2020-08-05 PROCEDURE — 36415 COLL VENOUS BLD VENIPUNCTURE: CPT

## 2020-08-05 PROCEDURE — 85025 COMPLETE CBC W/AUTO DIFF WBC: CPT

## 2020-08-05 PROCEDURE — 82728 ASSAY OF FERRITIN: CPT

## 2020-08-05 PROCEDURE — 83540 ASSAY OF IRON: CPT

## 2020-08-05 PROCEDURE — 82306 VITAMIN D 25 HYDROXY: CPT

## 2020-08-05 RX ORDER — ESCITALOPRAM OXALATE 20 MG/1
20 TABLET ORAL DAILY
Qty: 30 TABLET | Refills: 0 | Status: SHIPPED | OUTPATIENT
Start: 2020-08-05 | End: 2020-09-02 | Stop reason: SDUPTHER

## 2020-08-12 ENCOUNTER — OFFICE VISIT (OUTPATIENT)
Dept: FAMILY MEDICINE CLINIC | Facility: CLINIC | Age: 75
End: 2020-08-12
Payer: MEDICARE

## 2020-08-12 VITALS
TEMPERATURE: 98 F | SYSTOLIC BLOOD PRESSURE: 128 MMHG | OXYGEN SATURATION: 94 % | DIASTOLIC BLOOD PRESSURE: 68 MMHG | HEART RATE: 70 BPM | BODY MASS INDEX: 23.45 KG/M2 | WEIGHT: 127.4 LBS | HEIGHT: 62 IN

## 2020-08-12 DIAGNOSIS — F41.1 GAD (GENERALIZED ANXIETY DISORDER): ICD-10-CM

## 2020-08-12 DIAGNOSIS — Z63.4 SPOUSE DECEASED: Primary | ICD-10-CM

## 2020-08-12 DIAGNOSIS — F41.9 INSOMNIA SECONDARY TO ANXIETY: ICD-10-CM

## 2020-08-12 DIAGNOSIS — F51.05 INSOMNIA SECONDARY TO ANXIETY: ICD-10-CM

## 2020-08-12 PROCEDURE — 1036F TOBACCO NON-USER: CPT | Performed by: PHYSICIAN ASSISTANT

## 2020-08-12 PROCEDURE — 3078F DIAST BP <80 MM HG: CPT | Performed by: PHYSICIAN ASSISTANT

## 2020-08-12 PROCEDURE — 3074F SYST BP LT 130 MM HG: CPT | Performed by: PHYSICIAN ASSISTANT

## 2020-08-12 PROCEDURE — 4040F PNEUMOC VAC/ADMIN/RCVD: CPT | Performed by: PHYSICIAN ASSISTANT

## 2020-08-12 PROCEDURE — 3008F BODY MASS INDEX DOCD: CPT | Performed by: PHYSICIAN ASSISTANT

## 2020-08-12 PROCEDURE — 1160F RVW MEDS BY RX/DR IN RCRD: CPT | Performed by: PHYSICIAN ASSISTANT

## 2020-08-12 PROCEDURE — 99213 OFFICE O/P EST LOW 20 MIN: CPT | Performed by: PHYSICIAN ASSISTANT

## 2020-08-12 SDOH — SOCIAL STABILITY - SOCIAL INSECURITY: DISSAPEARANCE AND DEATH OF FAMILY MEMBER: Z63.4

## 2020-08-12 NOTE — PROGRESS NOTES
Assessment/Plan:    Problem List Items Addressed This Visit     None      Visit Diagnoses     Spouse     -  Primary    ELOISA (generalized anxiety disorder)        Insomnia secondary to anxiety               Diagnoses and all orders for this visit:    Spouse     ELOISA (generalized anxiety disorder)    Insomnia secondary to anxiety        Babar Tucker is stable  He will check up with me again in 1 month or sooner if anything worsens or if his dizziness returns  Subjective:      Patient ID: Naveed Acosta is a 76 y o  male  Babar Tucker returns today for a follow up  Since seeing him last, he started to cut his losartan in half and is only taking 50 mg daily  His BP is much better as is his dizziness  He continues to grieve his wife, he eats 2 meals daily, often provided by his daughter who lives nearby  He does take his dog outside when it is not so hot  The following portions of the patient's history were reviewed and updated as appropriate:   He has a past medical history of Acute gouty arthropathy, Arthritis, Bacterial pneumonia, Depression, Fracture of rib, closed, Gout, Hyperlipidemia, Hypertension, and Melanoma ([facility] [address] )  ,  does not have any pertinent problems on file  ,   has a past surgical history that includes Eye surgery; Malignant skin lesion excision; pr shldr arthroscop,lyse adhesns (Right, 2017); and Mohs surgery  ,  family history includes Aneurysm in his father; Aortic aneurysm in his family; Cancer in his mother; No Known Problems in his sister  ,   reports that he has never smoked  He has never used smokeless tobacco  He reports previous alcohol use  He reports that he does not use drugs  ,  has No Known Allergies     Current Outpatient Medications   Medication Sig Dispense Refill    ALPRAZolam (XANAX) 0 25 mg tablet Take 1 tablet (0 25 mg total) by mouth daily at bedtime as needed for anxiety or sleep 30 tablet 0    cyanocobalamin (VITAMIN B-12) 1,000 mcg tablet Take 1,000 mcg by mouth daily   escitalopram (LEXAPRO) 20 mg tablet Take 1 tablet (20 mg total) by mouth daily 30 tablet 0    losartan (COZAAR) 100 MG tablet Take 0 5 tablets (50 mg total) by mouth daily 90 tablet 1    Misc Natural Products (OSTEO BI-FLEX ADV TRIPLE ST PO) Take by mouth      Multiple Vitamin (MULTIVITAMIN) tablet Take 1 tablet by mouth daily      sulfaSALAzine (AZULFIDINE-EN) 500 mg EC tablet Take 2 tablets (1,000 mg total) by mouth 2 (two) times a day 120 tablet 3    VITAMIN D, CHOLECALCIFEROL, PO Take 2,000 Units by mouth daily   mesalamine (ASACOL) 800 MG EC tablet Take 2 tablets (1,600 mg total) by mouth 3 (three) times a day (Patient not taking: Reported on 8/12/2020) 180 tablet 2    mesalamine (LIALDA) 1 2 g EC tablet Take 4 tablets (4 8 g total) by mouth daily with breakfast (Patient not taking: Reported on 8/12/2020) 120 tablet 3     No current facility-administered medications for this visit  Review of Systems   Constitutional: Negative for activity change, appetite change, chills, diaphoresis, fatigue, fever and unexpected weight change  HENT: Negative for congestion, ear pain, postnasal drip, rhinorrhea, sinus pressure, sinus pain, sneezing, sore throat, tinnitus and voice change  Eyes: Negative for pain, redness and visual disturbance  Respiratory: Negative for cough, chest tightness, shortness of breath and wheezing  Cardiovascular: Negative for chest pain, palpitations and leg swelling  Gastrointestinal: Negative for abdominal pain, blood in stool, constipation, diarrhea, nausea and vomiting  Genitourinary: Negative for difficulty urinating, dysuria, frequency, hematuria and urgency  Musculoskeletal: Negative for arthralgias, back pain, gait problem, joint swelling, myalgias, neck pain and neck stiffness  Skin: Negative for color change, pallor, rash and wound  Neurological: Negative for dizziness, tremors, weakness, light-headedness and headaches  Psychiatric/Behavioral: Negative for dysphoric mood, self-injury, sleep disturbance and suicidal ideas  The patient is not nervous/anxious  Objective:  Vitals:    08/12/20 0955   BP: 128/68   Pulse: 70   Temp: 98 °F (36 7 °C)   SpO2: 94%   Weight: 57 8 kg (127 lb 6 4 oz)   Height: 5' 2" (1 575 m)     Body mass index is 23 3 kg/m²  Physical Exam  Vitals signs reviewed  Constitutional:       General: He is not in acute distress  Appearance: He is well-developed  He is not diaphoretic  HENT:      Head: Normocephalic and atraumatic  Right Ear: Hearing, tympanic membrane, ear canal and external ear normal       Left Ear: Hearing, tympanic membrane, ear canal and external ear normal       Mouth/Throat:      Pharynx: Uvula midline  No oropharyngeal exudate  Eyes:      General: No scleral icterus  Right eye: No discharge  Left eye: No discharge  Conjunctiva/sclera: Conjunctivae normal    Neck:      Musculoskeletal: Neck supple  Thyroid: No thyromegaly  Vascular: No carotid bruit  Cardiovascular:      Rate and Rhythm: Normal rate and regular rhythm  Heart sounds: Normal heart sounds  No murmur  Pulmonary:      Effort: Pulmonary effort is normal  No respiratory distress  Breath sounds: Normal breath sounds  No wheezing  Abdominal:      General: Bowel sounds are normal  There is no distension  Palpations: Abdomen is soft  There is no mass  Tenderness: There is no abdominal tenderness  There is no guarding or rebound  Musculoskeletal: Normal range of motion  General: No tenderness  Lymphadenopathy:      Cervical: No cervical adenopathy  Skin:     General: Skin is warm and dry  Findings: No erythema or rash  Neurological:      Mental Status: He is alert and oriented to person, place, and time  Psychiatric:         Behavior: Behavior normal          Thought Content:  Thought content normal          Judgment: Judgment normal

## 2020-08-13 ENCOUNTER — OFFICE VISIT (OUTPATIENT)
Dept: UROLOGY | Facility: CLINIC | Age: 75
End: 2020-08-13
Payer: MEDICARE

## 2020-08-13 VITALS
BODY MASS INDEX: 23.23 KG/M2 | DIASTOLIC BLOOD PRESSURE: 82 MMHG | WEIGHT: 127 LBS | SYSTOLIC BLOOD PRESSURE: 130 MMHG | HEART RATE: 66 BPM | TEMPERATURE: 98 F

## 2020-08-13 DIAGNOSIS — R31.29 OTHER MICROSCOPIC HEMATURIA: ICD-10-CM

## 2020-08-13 DIAGNOSIS — R35.1 NOCTURIA: ICD-10-CM

## 2020-08-13 LAB
BACTERIA UR QL AUTO: ABNORMAL /HPF
BILIRUB UR QL STRIP: NEGATIVE
CLARITY UR: CLEAR
COLOR UR: ABNORMAL
GLUCOSE UR STRIP-MCNC: NEGATIVE MG/DL
HGB UR QL STRIP.AUTO: NEGATIVE
HYALINE CASTS #/AREA URNS LPF: ABNORMAL /LPF
KETONES UR STRIP-MCNC: NEGATIVE MG/DL
LEUKOCYTE ESTERASE UR QL STRIP: NEGATIVE
MUCOUS THREADS UR QL AUTO: ABNORMAL
NITRITE UR QL STRIP: NEGATIVE
NON-SQ EPI CELLS URNS QL MICRO: ABNORMAL /HPF
PH UR STRIP.AUTO: 6 [PH]
POST-VOID RESIDUAL VOLUME, ML POC: 15 ML
PROT UR STRIP-MCNC: NEGATIVE MG/DL
RBC #/AREA URNS AUTO: ABNORMAL /HPF
SL AMB  POCT GLUCOSE, UA: NORMAL
SL AMB LEUKOCYTE ESTERASE,UA: NORMAL
SL AMB POCT BILIRUBIN,UA: NORMAL
SL AMB POCT BLOOD,UA: NORMAL
SL AMB POCT CLARITY,UA: CLEAR
SL AMB POCT COLOR,UA: YELLOW
SL AMB POCT KETONES,UA: NORMAL
SL AMB POCT NITRITE,UA: NORMAL
SL AMB POCT PH,UA: 5
SL AMB POCT SPECIFIC GRAVITY,UA: 1.01
SL AMB POCT URINE PROTEIN: NORMAL
SL AMB POCT UROBILINOGEN: 0.2
SP GR UR STRIP.AUTO: 1.02 (ref 1–1.03)
UROBILINOGEN UR QL STRIP.AUTO: 0.2 E.U./DL
WBC #/AREA URNS AUTO: ABNORMAL /HPF

## 2020-08-13 PROCEDURE — 99204 OFFICE O/P NEW MOD 45 MIN: CPT | Performed by: PHYSICIAN ASSISTANT

## 2020-08-13 PROCEDURE — 81001 URINALYSIS AUTO W/SCOPE: CPT | Performed by: PHYSICIAN ASSISTANT

## 2020-08-13 PROCEDURE — 81002 URINALYSIS NONAUTO W/O SCOPE: CPT | Performed by: PHYSICIAN ASSISTANT

## 2020-08-13 PROCEDURE — 51798 US URINE CAPACITY MEASURE: CPT | Performed by: PHYSICIAN ASSISTANT

## 2020-08-13 NOTE — PATIENT INSTRUCTIONS
Hematuria   WHAT YOU NEED TO KNOW:   Hematuria is blood in your urine  Your urine may be bright red to dark brown  DISCHARGE INSTRUCTIONS:   Return to the emergency department if:   · You have blood in your urine after a new injury, such as a fall  · You are urinating very small amounts or not at all  · You feel like you cannot empty your bladder  · You have severe back or side pain that does not go away with treatment  Contact your healthcare provider if:   · You have a fever that gets worse or does not go away with treatment  · You cannot keep liquids or medicines down  · Your urine gets darker, even after you drink extra liquids  · You have questions or concerns about your condition, treatment, or care  Drink liquids as directed: You may need to drink extra liquids to help flush the blood from your body through your urine  Water is the best liquid to drink  Ask how much liquid to drink each day and which liquids are best for you  Follow up with your healthcare provider as directed:  Write down your questions so you remember to ask them during your visits  © 2017 [address] [facility] Information is for End User's use only and may not be sold, redistributed or otherwise used for commercial purposes  All illustrations and images included in CareNotes® are the copyrighted property of A D A M , Inc  or [employer]  The above information is an  only  It is not intended as medical advice for individual conditions or treatments  Talk to your doctor, nurse or pharmacist before following any medical regimen to see if it is safe and effective for you

## 2020-08-13 NOTE — PROGRESS NOTES
8/13/2020      Chief Complaint   Patient presents with    Microhematuria    Nocturia       Assessment and Plan    76 y o  male managed by NEW PATIENT    1  Nocturia  - x 3, with recent nighttime anxiety and insomnia  - ua with large blood  - pvr 15ml  - hematuria workup first, continue sleep aid and stress reduction    2  Abnormal urinalysis  - 6/6/2020 urine dip with small leukocytes, moderate blood; 2-4 rbc, 2-4 wbc, culture no growth  - urine dip today with large blood  - formal microscopic analysis and proceed to CT scan and cystoscopy for true microhematuria    3  Prostate cancer screening  - PSA 1 3 (6/12/2020), 1 3 (2019), 1 6 (2018), 2 0 (2017), 1 8 (2016)  - VETO smooth small prostate no nodule    I think his nighttime symptoms are stress and anxiety related  He is grieving and learning to cope on his own at home alone  He's having trouble sleeping and staying asleep  He's now taking xanax for that  He feels he is waking up with anxiety and then while awake he goes to urinate a little bit, but not that the need to urinate is actually waking him up  Sometimes he can even wait out the urge while he is just laying there and it goes away  I would recommend pursuing microhematuria workup now  His prostate cancer screening is up to date  He has no daytime urinary bother and a good strong stream         History of Present Illness  Magan Mcqueen is a 76 y o  male here for new patient evaluation of nocturia and microhematuria referred by Dr Princess Samuels  He has no daytime urinary bother but reports urgency and pressure feeling he has to get up to urinate several times overnight but is small volumes  He has no dysuria and no gross hematuria  At onset of symptoms 6/6/2020 patient was evaluated at urgent care a UA showed small leukocytes and moderate blood  He was treated with keflex for a week with no change in symptoms  His urine culture was no growth   He is struggling with anxiety and insomnia as he is actively grieving the recent death of his wife in July after prolonged illness  Patient also has Crohn's disease, controlled with sulfasalazine  He's had some recent dizzy spells which have resolved after decreasing his losartan dose  I would be hesitant to utilize alpha blockers right now for this reason  Review of Systems   Constitutional: Negative  Negative for chills, fatigue, fever and unexpected weight change  HENT: Negative for congestion and sore throat  Respiratory: Negative  Negative for cough and shortness of breath  Cardiovascular: Negative  Negative for chest pain, palpitations and leg swelling  Gastrointestinal: Negative for abdominal pain, anal bleeding, blood in stool, constipation, rectal pain and vomiting  Genitourinary: Positive for frequency (nocturia x 3)  Negative for decreased urine volume, difficulty urinating, dysuria, flank pain, hematuria, penile swelling, scrotal swelling, testicular pain and urgency  Musculoskeletal: Negative  Negative for back pain  Allergic/Immunologic: Negative for immunocompromised state  Psychiatric/Behavioral: Positive for sleep disturbance  Negative for confusion, self-injury and suicidal ideas  The patient is nervous/anxious                   Past Medical History  Past Medical History:   Diagnosis Date    Acute gouty arthropathy     Last Assessed: 6/9/2015    Arthritis     Bacterial pneumonia     Last Assessed: 6/21/2016    Depression     Fracture of rib, closed     Last Assessed: 6/21/2016    Gout     Hyperlipidemia     Hypertension     Melanoma [facility])      Past Social History  Past Surgical History:   Procedure Laterality Date    EYE SURGERY      MALIGNANT SKIN LESION EXCISION      MOHS SURGERY      shaving of lesion moh's technique     DE SHLDR ARTHROSCOP,LYSE ADHESNS Right 8/28/2017    Procedure: ARTHROSCOPY [facility] UNDER GENERAL ANESTHESIA ,LYSIS OF ADHESIONS, CAPSULECTOMY;  Surgeon: Shabana Masterson MD;  Location: [facility];  Service: Orthopedics     Social History     Tobacco Use   Smoking Status Never Smoker   Smokeless Tobacco Never Used     Past Family History  Family History   Problem Relation Age of Onset    Cancer Mother     Aneurysm Father     No Known Problems Sister     Aortic aneurysm Family      Past Social history  Social History     Socioeconomic History    Marital status:       Spouse name: Not on file    Number of children: Not on file    Years of education: Not on file    Highest education level: Not on file   Occupational History    Occupation: Retired    Social Needs    Financial resource strain: Not on file    Food insecurity     Worry: Not on file     Inability: Not on file   [employer] needs     Medical: Not on file     Non-medical: Not on file   Tobacco Use    Smoking status: Never Smoker    Smokeless tobacco: Never Used   Substance and Sexual Activity    Alcohol use: Not Currently     Comment: rare, being a social drinker     Drug use: No    Sexual activity: Yes   Lifestyle    Physical activity     Days per week: Not on file     Minutes per session: Not on file    Stress: Not on file   Relationships    Social connections     Talks on phone: Not on file     Gets together: Not on file     Attends Zoroastrian service: Not on file     Active member of club or organization: Not on file     Attends meetings of clubs or organizations: Not on file     Relationship status: Not on file    Intimate partner violence     Fear of current or ex partner: Not on file     Emotionally abused: Not on file     Physically abused: Not on file     Forced sexual activity: Not on file   Other Topics Concern    Not on file   Social History Narrative    Not on file     Current Medications  Current Outpatient Medications   Medication Sig Dispense Refill    ALPRAZolam (XANAX) 0 25 mg tablet Take 1 tablet (0 25 mg total) by mouth daily at bedtime as needed for anxiety or sleep 30 tablet 0    cyanocobalamin (VITAMIN B-12) 1,000 mcg tablet Take 1,000 mcg by mouth daily   escitalopram (LEXAPRO) 20 mg tablet Take 1 tablet (20 mg total) by mouth daily 30 tablet 0    losartan (COZAAR) 100 MG tablet Take 0 5 tablets (50 mg total) by mouth daily 90 tablet 1    Misc Natural Products (OSTEO BI-FLEX ADV TRIPLE ST PO) Take by mouth      Multiple Vitamin (MULTIVITAMIN) tablet Take 1 tablet by mouth daily      sulfaSALAzine (AZULFIDINE-EN) 500 mg EC tablet Take 2 tablets (1,000 mg total) by mouth 2 (two) times a day 120 tablet 3    VITAMIN D, CHOLECALCIFEROL, PO Take 2,000 Units by mouth daily   mesalamine (ASACOL) 800 MG EC tablet Take 2 tablets (1,600 mg total) by mouth 3 (three) times a day (Patient not taking: Reported on 8/12/2020) 180 tablet 2    mesalamine (LIALDA) 1 2 g EC tablet Take 4 tablets (4 8 g total) by mouth daily with breakfast (Patient not taking: Reported on 8/12/2020) 120 tablet 3     No current facility-administered medications for this visit  Allergies  No Known Allergies      The following portions of the patient's history were reviewed and updated as appropriate: allergies, current medications, past medical history, past social history, past surgical history and problem list       Vitals  Vitals:    08/13/20 0940   BP: 130/82   Pulse: 66   Temp: 98 °F (36 7 °C)   Weight: 57 6 kg (127 lb)       Physical Exam  Vitals signs and nursing note reviewed  Constitutional:       General: He is not in acute distress  Appearance: He is well-developed  He is not diaphoretic  Comments: Small frame white male  Pleasant, conversive  HENT:      Head: Normocephalic and atraumatic  Pulmonary:      Effort: Pulmonary effort is normal    Abdominal:      General: Abdomen is flat  There is no distension  Palpations: Abdomen is soft  There is no mass  Tenderness: There is no abdominal tenderness  There is no right CVA tenderness or left CVA tenderness        Hernia: No hernia is present  Genitourinary:     Comments: Circumcised penis normal phallus  Testes smooth descended bilaterally into the scrotum without mass or tenderness  Scrotal skin normal  No hernia appreciated  VETO smooth small prostate without appreciable nodule  Rectal internal hemorrhoid/polyp noted, no external  No gross blood  Skin:     General: Skin is warm and dry  Neurological:      Mental Status: He is alert and oriented to person, place, and time  Gait: Gait normal    Psychiatric:         Speech: Speech normal          Behavior: Behavior normal            Results  Recent Results (from the past 1 hour(s))   POCT urine dip    Collection Time: 08/13/20  9:47 AM   Result Value Ref Range    LEUKOCYTE ESTERASE,UA -     NITRITE,UA -     SL AMB POCT UROBILINOGEN 0 2     POCT URINE PROTEIN -      PH,UA 5 0     BLOOD,UA +++     SPECIFIC GRAVITY,UA 1 010     KETONES,UA -     BILIRUBIN,UA -     GLUCOSE, UA -      COLOR,UA yellow     CLARITY,UA clear    POCT Measure PVR    Collection Time: 08/13/20  9:47 AM   Result Value Ref Range    POST-VOID RESIDUAL VOLUME, ML POC 15 mL   ]  Lab Results   Component Value Date    PSA 1 3 06/12/2020    PSA 1 3 09/13/2019    PSA 1 6 11/12/2018    PSA 2 0 08/23/2017     Lab Results   Component Value Date    GLUCOSE 84 10/31/2014    CALCIUM 9 4 08/05/2020     10/31/2014    K 4 2 08/05/2020    CO2 31 08/05/2020     (H) 08/05/2020    BUN 18 08/05/2020    CREATININE 0 87 08/05/2020     Lab Results   Component Value Date    WBC 8 05 08/05/2020    HGB 11 9 (L) 08/05/2020    HCT 38 5 08/05/2020    MCV 93 08/05/2020     08/05/2020         Orders  Orders Placed This Encounter   Procedures    CT renal protocol     Standing Status:   Future     Standing Expiration Date:   8/13/2024     Scheduling Instructions:      Nothing to eat 3 hours prior to your test   Clear liquids are also permitted up until the time of the scan    Clear liquids include water, black coffee or tea, apple juice or clear broth  If possible wear clothing without any metal in the abdomen area  Sweat suit, shorts, sports bra or bra without underwire may eliminate the need to change  Please bring your insurance cards, a form of photo ID and a list of your medications with you  Arrive 15 minutes prior to your appointment time in order to register  On the day of your test, please bring any prior CT or MRI studies of this area with you that were not performed at a [facility]  To schedule this appointment, please contact Central Scheduling at [phone]  Order Specific Question:   What is the patient's sedation requirement? Answer:   No Sedation     Order Specific Question:   Contrast information:     Answer:   IV     Order Specific Question:   Did the patient ever have a reaction to x-ray dye? If yes, please verify the type of allergy and order the contrast allergy prep       Answer:   No     Order Specific Question:   Mychart Results Delay     Answer:   30 days    Urinalysis with microscopic    POCT urine dip    POCT Measure PVR    Cystoscopy     Standing Status:   Future     Standing Expiration Date:   8/13/2021

## 2020-08-17 ENCOUNTER — TELEPHONE (OUTPATIENT)
Dept: UROLOGY | Facility: CLINIC | Age: 75
End: 2020-08-17

## 2020-08-17 NOTE — TELEPHONE ENCOUNTER
LVM for pt to return call to scheduled cystoscopy next available with Dr Savana Alves at [facility]

## 2020-08-17 NOTE — TELEPHONE ENCOUNTER
Sure I can call him with CT results when it's done, should still be scheduled for cysto as well (first ever microhematuria workup)

## 2020-08-17 NOTE — TELEPHONE ENCOUNTER
This is a patient of Dr Melissa Don and seen by Bryson Awad in [place]  Patient called and was not aware of getting a cysto done  I do not see any upcoming openings  He also asked for a follow up to go over his results for his CT that is scheduled on 8/21/20  I do not see any openings  Please call patient back at [phone]

## 2020-08-19 NOTE — TELEPHONE ENCOUNTER
Called and spoke with patient  Patient scheduled for 10/13/20 at 10:00am for a cysto  Patient scheduled 8/21/20 for CT scan  Informed patient that we will call with CT scan results

## 2020-08-19 NOTE — TELEPHONE ENCOUNTER
Patient called back to schedule cysto  No available appointment until November 9th  Please advise on appropriate appointment  If no answer please leave message with appointment details

## 2020-08-21 ENCOUNTER — HOSPITAL ENCOUNTER (OUTPATIENT)
Dept: CT IMAGING | Facility: HOSPITAL | Age: 75
Discharge: HOME | End: 2020-08-21
Payer: MEDICARE

## 2020-08-21 DIAGNOSIS — R31.29 OTHER MICROSCOPIC HEMATURIA: ICD-10-CM

## 2020-08-21 PROCEDURE — G1004 CDSM NDSC: HCPCS

## 2020-08-21 PROCEDURE — 74178 CT ABD&PLV WO CNTR FLWD CNTR: CPT

## 2020-08-21 RX ADMIN — IOHEXOL 100 ML: 350 INJECTION, SOLUTION INTRAVENOUS at 07:57

## 2020-08-26 DIAGNOSIS — F51.05 INSOMNIA SECONDARY TO ANXIETY: ICD-10-CM

## 2020-08-26 DIAGNOSIS — F41.9 INSOMNIA SECONDARY TO ANXIETY: ICD-10-CM

## 2020-08-26 RX ORDER — ALPRAZOLAM 0.25 MG/1
0.25 TABLET ORAL
Qty: 30 TABLET | Refills: 0 | Status: SHIPPED | OUTPATIENT
Start: 2020-08-26 | End: 2020-09-28 | Stop reason: SDUPTHER

## 2020-08-26 NOTE — TELEPHONE ENCOUNTER
Controlled Substance Review    [place] PDMP or [place]  reviewed: No red flags were identified; safe to proceed with prescription  Yelena Crook     PDMP Review       Value Time User    PDMP Reviewed  Yes 8/26/2020  3:45 PM Rosie Li PA-C

## 2020-09-02 DIAGNOSIS — F41.1 GAD (GENERALIZED ANXIETY DISORDER): ICD-10-CM

## 2020-09-02 RX ORDER — ESCITALOPRAM OXALATE 20 MG/1
20 TABLET ORAL DAILY
Qty: 30 TABLET | Refills: 0 | Status: SHIPPED | OUTPATIENT
Start: 2020-09-02 | End: 2020-09-28 | Stop reason: SDUPTHER

## 2020-09-15 ENCOUNTER — OFFICE VISIT (OUTPATIENT)
Dept: FAMILY MEDICINE CLINIC | Facility: CLINIC | Age: 75
End: 2020-09-15
Payer: MEDICARE

## 2020-09-15 VITALS
HEART RATE: 68 BPM | OXYGEN SATURATION: 97 % | TEMPERATURE: 96.2 F | SYSTOLIC BLOOD PRESSURE: 142 MMHG | WEIGHT: 130 LBS | HEIGHT: 62 IN | DIASTOLIC BLOOD PRESSURE: 86 MMHG | BODY MASS INDEX: 23.92 KG/M2

## 2020-09-15 DIAGNOSIS — R35.0 FREQUENT URINATION: ICD-10-CM

## 2020-09-15 DIAGNOSIS — Z23 NEED FOR INFLUENZA VACCINATION: ICD-10-CM

## 2020-09-15 DIAGNOSIS — Z00.00 MEDICARE ANNUAL WELLNESS VISIT, SUBSEQUENT: Primary | ICD-10-CM

## 2020-09-15 LAB
SL AMB  POCT GLUCOSE, UA: NORMAL
SL AMB LEUKOCYTE ESTERASE,UA: NORMAL
SL AMB POCT BILIRUBIN,UA: NORMAL
SL AMB POCT BLOOD,UA: NORMAL
SL AMB POCT CLARITY,UA: CLEAR
SL AMB POCT COLOR,UA: YELLOW
SL AMB POCT KETONES,UA: NORMAL
SL AMB POCT NITRITE,UA: NORMAL
SL AMB POCT PH,UA: 6
SL AMB POCT SPECIFIC GRAVITY,UA: 1.02
SL AMB POCT URINE PROTEIN: NORMAL
SL AMB POCT UROBILINOGEN: 0.2

## 2020-09-15 PROCEDURE — 81002 URINALYSIS NONAUTO W/O SCOPE: CPT | Performed by: PHYSICIAN ASSISTANT

## 2020-09-15 PROCEDURE — G0008 ADMIN INFLUENZA VIRUS VAC: HCPCS | Performed by: PHYSICIAN ASSISTANT

## 2020-09-15 PROCEDURE — G0439 PPPS, SUBSEQ VISIT: HCPCS | Performed by: PHYSICIAN ASSISTANT

## 2020-09-15 PROCEDURE — 87086 URINE CULTURE/COLONY COUNT: CPT | Performed by: PHYSICIAN ASSISTANT

## 2020-09-15 PROCEDURE — 90662 IIV NO PRSV INCREASED AG IM: CPT | Performed by: PHYSICIAN ASSISTANT

## 2020-09-15 NOTE — PROGRESS NOTES
Assessment and Plan:     Problem List Items Addressed This Visit     None      Visit Diagnoses     Medicare annual wellness visit, subsequent    -  Primary    Need for influenza vaccination        Relevant Orders    influenza vaccine, high-dose, PF 0 7 mL (FLUZONE HIGH-DOSE) (Completed)    Frequent urination        Relevant Orders    POCT urine dip (Completed)    Urine culture           Preventive health issues were discussed with patient, and age appropriate screening tests were ordered as noted in patient's After Visit Summary  Personalized health advice and appropriate referrals for health education or preventive services given if needed, as noted in patient's After Visit Summary       History of Present Illness:     Patient presents for Medicare Annual Wellness visit    Patient Care Team:  Pat Madrigal PA-C as PCP - General (Physician Assistant)  Prasanth Rodriguez MD     Problem List:     Patient Active Problem List   Diagnosis    Rotator cuff tear arthropathy of right shoulder    Arthritis    Depression    Dyslipidemia    Erectile dysfunction of non-organic origin    Essential hypertension    Malignant melanoma of skin ([address] )    Primary osteoarthritis of both shoulders    Primary osteoarthritis of first carpometacarpal joint of right hand    Crohn's disease with rectal bleeding ([address] )    Diarrhea    Weight loss, unintentional      Past Medical and Surgical History:     Past Medical History:   Diagnosis Date    Acute gouty arthropathy     Last Assessed: 6/9/2015    Arthritis     Bacterial pneumonia     Last Assessed: 6/21/2016    Depression     Fracture of rib, closed     Last Assessed: 6/21/2016    Gout     Hyperlipidemia     Hypertension     Melanoma ([address] )      Past Surgical History:   Procedure Laterality Date    EYE SURGERY      MALIGNANT SKIN LESION EXCISION      MOHS SURGERY      shaving of lesion moh's technique     [place] SHLDR ARTHROSCOP,LYSE ADHESNS Right 8/28/2017    Procedure: ARTHROSCOPY [facility] UNDER GENERAL ANESTHESIA ,LYSIS OF ADHESIONS, CAPSULECTOMY;  Surgeon: Ivonne Garcia MD;  Location: [facility];  Service: Orthopedics      Family History:     Family History   Problem Relation Age of Onset    Cancer Mother     Aneurysm Father     No Known Problems Sister     Aortic aneurysm Family       Social History:        Social History     Socioeconomic History    Marital status:      Spouse name: None    Number of children: None    Years of education: None    Highest education level: None   Occupational History    Occupation: Retired    Social Needs    Financial resource strain: None    Food insecurity     Worry: None     Inability: None    Transportation needs     Medical: None     Non-medical: None   Tobacco Use    Smoking status: Never Smoker    Smokeless tobacco: Never Used   Substance and Sexual Activity    Alcohol use: Not Currently     Comment: rare, being a social drinker     Drug use: No    Sexual activity: Yes   Lifestyle    Physical activity     Days per week: None     Minutes per session: None    Stress: None   Relationships    Social connections     Talks on phone: None     Gets together: None     Attends Nondenominational service: None     Active member of club or organization: None     Attends meetings of clubs or organizations: None     Relationship status: None    Intimate partner violence     Fear of current or ex partner: None     Emotionally abused: None     Physically abused: None     Forced sexual activity: None   Other Topics Concern    None   Social History Narrative    None      Medications and Allergies:     Current Outpatient Medications   Medication Sig Dispense Refill    ALPRAZolam (XANAX) 0 25 mg tablet Take 1 tablet (0 25 mg total) by mouth daily at bedtime as needed for anxiety or sleep 30 tablet 0    cyanocobalamin (VITAMIN B-12) 1,000 mcg tablet Take 1,000 mcg by mouth daily        escitalopram (LEXAPRO) 20 mg tablet Take 1 tablet (20 mg total) by mouth daily 30 tablet 0    losartan (COZAAR) 100 MG tablet Take 0 5 tablets (50 mg total) by mouth daily 90 tablet 1    Misc Natural Products (OSTEO BI-FLEX ADV TRIPLE ST PO) Take by mouth      Multiple Vitamin (MULTIVITAMIN) tablet Take 1 tablet by mouth daily      sulfaSALAzine (AZULFIDINE-EN) 500 mg EC tablet Take 2 tablets (1,000 mg total) by mouth 2 (two) times a day 120 tablet 3    VITAMIN D, CHOLECALCIFEROL, PO Take 2,000 Units by mouth daily   mesalamine (ASACOL) 800 MG EC tablet Take 2 tablets (1,600 mg total) by mouth 3 (three) times a day (Patient not taking: Reported on 8/12/2020) 180 tablet 2    mesalamine (LIALDA) 1 2 g EC tablet Take 4 tablets (4 8 g total) by mouth daily with breakfast (Patient not taking: Reported on 8/12/2020) 120 tablet 3     No current facility-administered medications for this visit  No Known Allergies   Immunizations:     Immunization History   Administered Date(s) Administered    Influenza, high dose seasonal 0 7 mL 09/10/2019, 09/15/2020    Pneumococcal Conjugate 13-Valent 08/29/2018    Pneumococcal Polysaccharide PPV23 09/10/2019    Tdap 06/18/2016      Health Maintenance:         Topic Date Due    Hepatitis C Screening  Completed         Topic Date Due    Influenza Vaccine  07/01/2020      Medicare Health Risk Assessment:     /86   Pulse 68   Temp (!) 96 2 °F (35 7 °C)   Ht 5' 2" (1 575 m)   Wt 59 kg (130 lb)   SpO2 97%   BMI 23 78 kg/m²      Trent Moreno is here for his Subsequent Wellness visit  Last Medicare Wellness visit information reviewed, patient interviewed and updates made to the record today  Health Risk Assessment:   Patient rates overall health as fair  Patient feels that their physical health rating is slightly worse  Eyesight was rated as same  Hearing was rated as same  Patient feels that their emotional and mental health rating is much worse  Pain experienced in the last 7 days has been some  Patient's pain rating has been 3/10  Patient states that he has experienced weight loss or gain in last 6 months  Fall Risk Screening: In the past year, patient has experienced: no history of falling in past year      Home Safety:  Patient does not have trouble with stairs inside or outside of their home  Patient has working smoke alarms and has working carbon monoxide detector  Home safety hazards include: none  Nutrition:   Current diet is Regular  Medications:   Patient is currently taking over-the-counter supplements  OTC medications include: see medication list  Patient is able to manage medications  Activities of Daily Living (ADLs)/Instrumental Activities of Daily Living (IADLs):   Walk and transfer into and out of bed and chair?: Yes  Dress and groom yourself?: Yes    Bathe or shower yourself?: Yes    Feed yourself?  Yes  Do your laundry/housekeeping?: Yes  Manage your money, pay your bills and track your expenses?: Yes  Make your own meals?: Yes    Do your own shopping?: Yes    Previous Hospitalizations:   Any hospitalizations or ED visits within the last 12 months?: No      Advance Care Planning:   Living will: Yes    Durable POA for healthcare: No    Advanced directive: Yes      Cognitive Screening:   Provider or family/friend/caregiver concerned regarding cognition?: No    PREVENTIVE SCREENINGS      Cardiovascular Screening:    General: Screening Current      Diabetes Screening:     General: Screening Current      Colorectal Cancer Screening:     General: Screening Current      Prostate Cancer Screening:    General: Screening Current      Osteoporosis Screening:    General: Screening Not Indicated      Abdominal Aortic Aneurysm (AAA) Screening:    Risk factors include: age between 73-67 yo        General: Screening Not Indicated      Lung Cancer Screening:     General: Screening Not Indicated      Hepatitis C Screening:    General: Screening Current      Parbhu Faria PA-C

## 2020-09-15 NOTE — PATIENT INSTRUCTIONS
Medicare Preventive Visit Patient Instructions  Thank you for completing your Welcome to Medicare Visit or Medicare Annual Wellness Visit today  Your next wellness visit will be due in one year (9/15/2021)  The screening/preventive services that you may require over the next 5-10 years are detailed below  Some tests may not apply to you based off risk factors and/or age  Screening tests ordered at today's visit but not completed yet may show as past due  Also, please note that scanned in results may not display below  Preventive Screenings:  Service Recommendations Previous Testing/Comments   Colorectal Cancer Screening  · Colonoscopy    · Fecal Occult Blood Test (FOBT)/Fecal Immunochemical Test (FIT)  · Fecal DNA/Cologuard Test  · Flexible Sigmoidoscopy Age: 54-65 years old   Colonoscopy: every 10 years (May be performed more frequently if at higher risk)  OR  FOBT/FIT: every 1 year  OR  Cologuard: every 3 years  OR  Sigmoidoscopy: every 5 years  Screening may be recommended earlier than age 48 if at higher risk for colorectal cancer  Also, an individualized decision between you and your healthcare provider will decide whether screening between the ages of 74-80 would be appropriate   Colonoscopy: 07/07/2020  FOBT/FIT: 06/26/2020  Cologuard: Not on file  Sigmoidoscopy: Not on file    Screening Current     Prostate Cancer Screening Individualized decision between patient and health care provider in men between ages of 53-78   Medicare will cover every 12 months beginning on the day after your 50th birthday PSA: 1 3 ng/mL     Screening Not Indicated     Hepatitis C Screening Once for adults born between 1945 and 1965  More frequently in patients at high risk for Hepatitis C Hep C Antibody: 03/17/2020    Screening Current   Diabetes Screening 1-2 times per year if you're at risk for diabetes or have pre-diabetes Fasting glucose: 100 mg/dL   A1C: No results in last 5 years    Screening Current   Cholesterol Screening Once every 5 years if you don't have a lipid disorder  May order more often based on risk factors  Lipid panel: 09/13/2019    Screening Current      Other Preventive Screenings Covered by Medicare:  1  Abdominal Aortic Aneurysm (AAA) Screening: covered once if your at risk  You're considered to be at risk if you have a family history of AAA or a male between the age of 73-68 who smoking at least 100 cigarettes in your lifetime  2  Lung Cancer Screening: covers low dose CT scan once per year if you meet all of the following conditions: (1) Age 50-69; (2) No signs or symptoms of lung cancer; (3) Current smoker or have quit smoking within the last 15 years; (4) You have a tobacco smoking history of at least 30 pack years (packs per day x number of years you smoked); (5) You get a written order from a healthcare provider  3  Glaucoma Screening: covered annually if you're considered high risk: (1) You have diabetes OR (2) Family history of glaucoma OR (3)  aged 48 and older OR (3)  American aged 72 and older  3  Osteoporosis Screening: covered every 2 years if you meet one of the following conditions: (1) Have a vertebral abnormality; (2) On glucocorticoid therapy for more than 3 months; (3) Have primary hyperparathyroidism; (4) On osteoporosis medications and need to assess response to drug therapy  5  HIV Screening: covered annually if you're between the age of 12-76  Also covered annually if you are younger than 13 and older than 72 with risk factors for HIV infection  For pregnant patients, it is covered up to 3 times per pregnancy      Immunizations:  Immunization Recommendations   Influenza Vaccine Annual influenza vaccination during flu season is recommended for all persons aged >= 6 months who do not have contraindications   Pneumococcal Vaccine (Prevnar and Pneumovax)  * Prevnar = PCV13  * Pneumovax = PPSV23 Adults 25-60 years old: 1-3 doses may be recommended based on certain risk factors  Adults 72 years old: Prevnar (PCV13) vaccine recommended followed by Pneumovax (PPSV23) vaccine  If already received PPSV23 since turning 65, then PCV13 recommended at least one year after PPSV23 dose  Hepatitis B Vaccine 3 dose series if at intermediate or high risk (ex: diabetes, end stage renal disease, liver disease)   Tetanus (Td) Vaccine - COST NOT COVERED BY MEDICARE PART B Following completion of primary series, a booster dose should be given every 10 years to maintain immunity against tetanus  Td may also be given as tetanus wound prophylaxis  Tdap Vaccine - COST NOT COVERED BY MEDICARE PART B Recommended at least once for all adults  For pregnant patients, recommended with each pregnancy  Shingles Vaccine (Shingrix) - COST NOT COVERED BY MEDICARE PART B  2 shot series recommended in those aged 48 and above     Health Maintenance Due:      Topic Date Due    Hepatitis C Screening  Completed     Immunizations Due:      Topic Date Due    Influenza Vaccine  07/01/2020     Advance Directives   What are advance directives? Advance directives are legal documents that state your wishes and plans for medical care  These plans are made ahead of time in case you lose your ability to make decisions for yourself  Advance directives can apply to any medical decision, such as the treatments you want, and if you want to donate organs  What are the types of advance directives? There are many types of advance directives, and each state has rules about how to use them  You may choose a combination of any of the following:  · Living will: This is a written record of the treatment you want  You can also choose which treatments you do not want, which to limit, and which to stop at a certain time  This includes surgery, medicine, IV fluid, and tube feedings  · Durable power of  for healthcare [facility]):   This is a written record that states who you want to make healthcare choices for you when you are unable to make them for yourself  This person, called a proxy, is usually a family member or a friend  You may choose more than 1 proxy  · Do not resuscitate (DNR) order:  A DNR order is used in case your heart stops beating or you stop breathing  It is a request not to have certain forms of treatment, such as CPR  A DNR order may be included in other types of advance directives  · Medical directive: This covers the care that you want if you are in a coma, near death, or unable to make decisions for yourself  You can list the treatments you want for each condition  Treatment may include pain medicine, surgery, blood transfusions, dialysis, IV or tube feedings, and a ventilator (breathing machine)  · Values history: This document has questions about your views, beliefs, and how you feel and think about life  This information can help others choose the care that you would choose  Why are advance directives important? An advance directive helps you control your care  Although spoken wishes may be used, it is better to have your wishes written down  Spoken wishes can be misunderstood, or not followed  Treatments may be given even if you do not want them  An advance directive may make it easier for your family to make difficult choices about your care  © Copyright [employer] 2018 Information is for End User's use only and may not be sold, redistributed or otherwise used for commercial purposes   All illustrations and images included in CareNotes® are the copyrighted property of [employer]  or [address] [place] [address] [employer]

## 2020-09-16 LAB — BACTERIA UR CULT: NORMAL

## 2020-09-28 DIAGNOSIS — F51.05 INSOMNIA SECONDARY TO ANXIETY: ICD-10-CM

## 2020-09-28 DIAGNOSIS — F41.9 INSOMNIA SECONDARY TO ANXIETY: ICD-10-CM

## 2020-09-28 DIAGNOSIS — F41.1 GAD (GENERALIZED ANXIETY DISORDER): ICD-10-CM

## 2020-09-29 RX ORDER — ESCITALOPRAM OXALATE 20 MG/1
20 TABLET ORAL DAILY
Qty: 30 TABLET | Refills: 0 | Status: SHIPPED | OUTPATIENT
Start: 2020-09-29 | End: 2020-11-02 | Stop reason: SDUPTHER

## 2020-09-29 RX ORDER — ALPRAZOLAM 0.25 MG/1
0.25 TABLET ORAL
Qty: 30 TABLET | Refills: 0 | Status: SHIPPED | OUTPATIENT
Start: 2020-09-29 | End: 2020-10-26 | Stop reason: SDUPTHER

## 2020-09-29 NOTE — TELEPHONE ENCOUNTER
Controlled Sub[place]    [place] or [place]  reviewed: No red flags were identified; safe to proceed with prescription  Nestor Cruz     PDMP Review       Value Time User    PDMP Reviewed  Yes 9/29/2020 12:58 PM Barbara Michele PA-C

## 2020-10-13 ENCOUNTER — PROCEDURE VISIT (OUTPATIENT)
Dept: UROLOGY | Facility: CLINIC | Age: 75
End: 2020-10-13
Payer: MEDICARE

## 2020-10-13 VITALS — SYSTOLIC BLOOD PRESSURE: 138 MMHG | DIASTOLIC BLOOD PRESSURE: 80 MMHG | TEMPERATURE: 97.7 F | HEART RATE: 74 BPM

## 2020-10-13 DIAGNOSIS — R35.1 NOCTURIA: ICD-10-CM

## 2020-10-13 DIAGNOSIS — N32.81 OVERACTIVE BLADDER: ICD-10-CM

## 2020-10-13 DIAGNOSIS — R39.15 URINARY URGENCY: ICD-10-CM

## 2020-10-13 DIAGNOSIS — R35.0 URINARY FREQUENCY: ICD-10-CM

## 2020-10-13 DIAGNOSIS — R31.29 OTHER MICROSCOPIC HEMATURIA: Primary | ICD-10-CM

## 2020-10-13 PROCEDURE — 99214 OFFICE O/P EST MOD 30 MIN: CPT | Performed by: UROLOGY

## 2020-10-13 PROCEDURE — 52000 CYSTOURETHROSCOPY: CPT | Performed by: UROLOGY

## 2020-10-13 RX ORDER — CIPROFLOXACIN 500 MG/1
500 TABLET, FILM COATED ORAL ONCE
Qty: 1 TABLET | Refills: 0 | Status: SHIPPED | OUTPATIENT
Start: 2020-10-13 | End: 2020-10-13

## 2020-10-13 RX ORDER — OXYBUTYNIN CHLORIDE 5 MG/1
5 TABLET, EXTENDED RELEASE ORAL DAILY
Qty: 30 TABLET | Refills: 11 | Status: SHIPPED | OUTPATIENT
Start: 2020-10-13 | End: 2020-11-03 | Stop reason: DRUGHIGH

## 2020-10-26 DIAGNOSIS — F51.05 INSOMNIA SECONDARY TO ANXIETY: ICD-10-CM

## 2020-10-26 DIAGNOSIS — F41.9 INSOMNIA SECONDARY TO ANXIETY: ICD-10-CM

## 2020-10-26 RX ORDER — ALPRAZOLAM 0.25 MG/1
0.25 TABLET ORAL
Qty: 30 TABLET | Refills: 0 | Status: SHIPPED | OUTPATIENT
Start: 2020-10-26 | End: 2020-11-25 | Stop reason: SDUPTHER

## 2020-11-02 DIAGNOSIS — F41.1 GAD (GENERALIZED ANXIETY DISORDER): ICD-10-CM

## 2020-11-02 RX ORDER — ESCITALOPRAM OXALATE 20 MG/1
20 TABLET ORAL DAILY
Qty: 30 TABLET | Refills: 2 | Status: SHIPPED | OUTPATIENT
Start: 2020-11-02 | End: 2020-11-30 | Stop reason: SDUPTHER

## 2020-11-03 ENCOUNTER — TELEPHONE (OUTPATIENT)
Dept: UROLOGY | Facility: MEDICAL CENTER | Age: 75
End: 2020-11-03

## 2020-11-03 DIAGNOSIS — N32.81 OVERACTIVE BLADDER: Primary | ICD-10-CM

## 2020-11-03 RX ORDER — OXYBUTYNIN CHLORIDE 15 MG/1
15 TABLET, EXTENDED RELEASE ORAL
Qty: 30 TABLET | Refills: 1 | Status: SHIPPED | OUTPATIENT
Start: 2020-11-03 | End: 2020-11-16 | Stop reason: ALTCHOICE

## 2020-11-16 RX ORDER — MIRABEGRON 25 MG/1
25 TABLET, FILM COATED, EXTENDED RELEASE ORAL DAILY
Qty: 30 TABLET | Refills: 2 | Status: SHIPPED | OUTPATIENT
Start: 2020-11-16 | End: 2021-02-24

## 2020-11-17 ENCOUNTER — OFFICE VISIT (OUTPATIENT)
Dept: FAMILY MEDICINE CLINIC | Facility: CLINIC | Age: 75
End: 2020-11-17
Payer: MEDICARE

## 2020-11-17 VITALS
DIASTOLIC BLOOD PRESSURE: 80 MMHG | WEIGHT: 135.2 LBS | HEART RATE: 70 BPM | HEIGHT: 62 IN | TEMPERATURE: 95.9 F | OXYGEN SATURATION: 92 % | SYSTOLIC BLOOD PRESSURE: 120 MMHG | BODY MASS INDEX: 24.88 KG/M2

## 2020-11-17 DIAGNOSIS — Z13.31 DEPRESSION SCREENING NEGATIVE: ICD-10-CM

## 2020-11-17 DIAGNOSIS — I10 ESSENTIAL HYPERTENSION: Primary | ICD-10-CM

## 2020-11-17 DIAGNOSIS — E78.5 DYSLIPIDEMIA: ICD-10-CM

## 2020-11-17 PROCEDURE — 99213 OFFICE O/P EST LOW 20 MIN: CPT | Performed by: PHYSICIAN ASSISTANT

## 2020-11-25 DIAGNOSIS — F41.9 INSOMNIA SECONDARY TO ANXIETY: ICD-10-CM

## 2020-11-25 DIAGNOSIS — F51.05 INSOMNIA SECONDARY TO ANXIETY: ICD-10-CM

## 2020-11-25 RX ORDER — ALPRAZOLAM 0.25 MG/1
0.25 TABLET ORAL
Qty: 30 TABLET | Refills: 0 | Status: SHIPPED | OUTPATIENT
Start: 2020-11-25 | End: 2020-12-28 | Stop reason: SDUPTHER

## 2020-11-30 DIAGNOSIS — F41.1 GAD (GENERALIZED ANXIETY DISORDER): ICD-10-CM

## 2020-11-30 RX ORDER — ESCITALOPRAM OXALATE 20 MG/1
20 TABLET ORAL DAILY
Qty: 30 TABLET | Refills: 2 | Status: SHIPPED | OUTPATIENT
Start: 2020-11-30 | End: 2020-12-28 | Stop reason: SDUPTHER

## 2020-12-28 DIAGNOSIS — F41.1 GAD (GENERALIZED ANXIETY DISORDER): ICD-10-CM

## 2020-12-28 DIAGNOSIS — F41.9 INSOMNIA SECONDARY TO ANXIETY: ICD-10-CM

## 2020-12-28 DIAGNOSIS — F51.05 INSOMNIA SECONDARY TO ANXIETY: ICD-10-CM

## 2020-12-28 RX ORDER — ESCITALOPRAM OXALATE 20 MG/1
20 TABLET ORAL DAILY
Qty: 30 TABLET | Refills: 2 | Status: SHIPPED | OUTPATIENT
Start: 2020-12-28 | End: 2021-02-24 | Stop reason: SDUPTHER

## 2020-12-28 RX ORDER — ALPRAZOLAM 0.25 MG/1
0.25 TABLET ORAL
Qty: 30 TABLET | Refills: 0 | Status: SHIPPED | OUTPATIENT
Start: 2020-12-28 | End: 2021-01-25 | Stop reason: SDUPTHER

## 2021-01-14 DIAGNOSIS — I10 ESSENTIAL HYPERTENSION: ICD-10-CM

## 2021-01-14 RX ORDER — LOSARTAN POTASSIUM 50 MG/1
50 TABLET ORAL DAILY
Qty: 90 TABLET | Refills: 0 | Status: SHIPPED | OUTPATIENT
Start: 2021-01-14 | End: 2021-04-12 | Stop reason: SDUPTHER

## 2021-01-15 ENCOUNTER — VBI (OUTPATIENT)
Dept: ADMINISTRATIVE | Facility: OTHER | Age: 76
End: 2021-01-15

## 2021-01-25 DIAGNOSIS — F41.9 INSOMNIA SECONDARY TO ANXIETY: ICD-10-CM

## 2021-01-25 DIAGNOSIS — F51.05 INSOMNIA SECONDARY TO ANXIETY: ICD-10-CM

## 2021-01-25 RX ORDER — ALPRAZOLAM 0.25 MG/1
0.25 TABLET ORAL
Qty: 30 TABLET | Refills: 0 | Status: SHIPPED | OUTPATIENT
Start: 2021-01-25 | End: 2021-02-24 | Stop reason: SDUPTHER

## 2021-01-25 NOTE — TELEPHONE ENCOUNTER
Cont[place] Sub[place]    [place]  reviewed: No red flags were identified; safe to proceed with prescription  Cedrick Yepez     PDMP Review       Value Time User    PDMP Reviewed  Yes 1/25/2021  3:34 PM Cassandra Bond PA-C

## 2021-02-01 ENCOUNTER — VBI (OUTPATIENT)
Dept: ADMINISTRATIVE | Facility: OTHER | Age: 76
End: 2021-02-01

## 2021-02-02 ENCOUNTER — VBI (OUTPATIENT)
Dept: ADMINISTRATIVE | Facility: OTHER | Age: 76
End: 2021-02-02

## 2021-02-17 ENCOUNTER — TELEPHONE (OUTPATIENT)
Dept: ADMINISTRATIVE | Facility: OTHER | Age: 76
End: 2021-02-17

## 2021-02-17 ENCOUNTER — OFFICE VISIT (OUTPATIENT)
Dept: FAMILY MEDICINE CLINIC | Facility: CLINIC | Age: 76
End: 2021-02-17
Payer: MEDICARE

## 2021-02-17 VITALS
SYSTOLIC BLOOD PRESSURE: 152 MMHG | TEMPERATURE: 94 F | OXYGEN SATURATION: 98 % | WEIGHT: 131.6 LBS | BODY MASS INDEX: 24.22 KG/M2 | DIASTOLIC BLOOD PRESSURE: 82 MMHG | HEART RATE: 63 BPM | HEIGHT: 62 IN

## 2021-02-17 DIAGNOSIS — F32.1 CURRENT MODERATE EPISODE OF MAJOR DEPRESSIVE DISORDER WITHOUT PRIOR EPISODE (HCC): Primary | ICD-10-CM

## 2021-02-17 DIAGNOSIS — C43.9 MALIGNANT MELANOMA OF SKIN (HCC): ICD-10-CM

## 2021-02-17 DIAGNOSIS — K50.911 CROHN'S DISEASE WITH RECTAL BLEEDING, UNSPECIFIED GASTROINTESTINAL TRACT LOCATION (HCC): ICD-10-CM

## 2021-02-17 DIAGNOSIS — Z13.31 DEPRESSION SCREENING NEGATIVE: ICD-10-CM

## 2021-02-17 PROCEDURE — 99213 OFFICE O/P EST LOW 20 MIN: CPT | Performed by: PHYSICIAN ASSISTANT

## 2021-02-17 NOTE — LETTER
Vaccination Request Form: [id number] aka SARS-CoV-2 (Douglas Johnson or Conrado Stanley)      Date Requested: 21  Patient: Stefano Harrison  Patient : 1945   Referring Provider: Gavino Khan PA-C       The above patient has informed us that they have had their   most recent COVID-19 aka SARS-CoV-2 (Douglas Johnson or Conrado Stanley) administered at your facility  Please   complete this form and attach all corresponding documentation  Date of Vaccine(s) Given  ______________________________    Lot Number(s) _______________________________________    Manufacture(s) ______________________________________    Dose Amount (s) _____________________________________    Expiration Date(s) ____________________________________    Comments __________________________________________________________  ____________________________________________________________________  ____________________________________________________________________  ____________________________________________________________________    Administering Facility  ________________________________________________    Vaccine Administered By (print name) ___________________________________      Form Completed By (print name) _______________________________________      Signature ___________________________________________________________      These reports are needed for  compliance    Please fax this completed form and a copy of the Vaccine Documents to our office located at [place] [address] as soon as possible to [phone] kali Bardales: Phone [phone]    We thank you for your assistance in treating our mutual patient

## 2021-02-17 NOTE — PROGRESS NOTES
Assessment/Plan:    Problem List Items Addressed This Visit        Digestive    Crohn's disease with rectal bleeding ([address] )       Musculoskeletal and Integument    Malignant melanoma of skin ([address] )    Relevant Orders    Ambulatory referral to Dermatology       Other    Current moderate episode of major depressive disorder without prior episode ([address] ) - Primary      Other Visit Diagnoses     Depression screening negative               Diagnoses and all orders for this visit:    Current moderate episode of major depressive disorder without prior episode ([address] )    Malignant melanoma of skin ([facility] )  -     Ambulatory referral to Dermatology; Future    Crohn's disease with rectal bleeding, unspecified gastrointestinal tract location [facility])    Depression screening negative        No problem-specific Assessment & Plan notes found for this encounter  Subjective:      Patient ID: Stefano Harrison is a 68 y o  male  Kurt Tomlinson is here today for follow up  He feels well over all, no new complaints  He has not been to see dermatology for yearly skin CA check up, will place referral for appointment  Crohn's disease is stable, has not had any pain, diarrhea, or blood in stool  He did lose 4 lbs since last OV, will continue to monitor his weight  Pt tells me he got his 1st Covid vaccination at [facility], is scheduled for 2nd  We will contact [facility] to get this information for his record  The following portions of the patient's history were reviewed and updated as appropriate:   He has a past medical history of Acute gouty arthropathy, Arthritis, Bacterial pneumonia, Depression, Fracture of rib, closed, Gout, Hyperlipidemia, Hypertension, and Melanoma ([address] )  ,  does not have any pertinent problems on file  ,   has a past surgical history that includes Eye surgery; Malignant skin lesion excision; pr shldr arthroscop,lysashlyn benavides (Right, 8/28/2017); and Mohs surgery  ,  family history includes Aneurysm in his father;  Aortic aneurysm in his family; Cancer in his mother; No Known Problems in his sister  ,   reports that he has never smoked  He has never used smokeless tobacco  He reports previous alcohol use  He reports that he does not use drugs  ,  has No Known Allergies     Current Outpatient Medications   Medication Sig Dispense Refill    ALPRAZolam (XANAX) 0 25 mg tablet Take 1 tablet (0 25 mg total) by mouth daily at bedtime as needed for anxiety or sleep 30 tablet 0    cyanocobalamin (VITAMIN B-12) 1,000 mcg tablet Take 1,000 mcg by mouth daily   escitalopram (LEXAPRO) 20 mg tablet Take 1 tablet (20 mg total) by mouth daily 30 tablet 2    losartan (COZAAR) 50 mg tablet Take 1 tablet (50 mg total) by mouth daily 90 tablet 0    Mirabegron ER (Myrbetriq) 25 MG TB24 Take 25 mg by mouth daily 30 tablet 2    Misc Natural Products (OSTEO BI-FLEX ADV TRIPLE ST PO) Take by mouth      Multiple Vitamin (MULTIVITAMIN) tablet Take 1 tablet by mouth daily      sulfaSALAzine (AZULFIDINE-EN) 500 mg EC tablet Take 2 tablets (1,000 mg total) by mouth 2 (two) times a day 120 tablet 3    VITAMIN D, CHOLECALCIFEROL, PO Take 2,000 Units by mouth daily   mesalamine (ASACOL) 800 MG EC tablet Take 2 tablets (1,600 mg total) by mouth 3 (three) times a day (Patient not taking: Reported on 8/12/2020) 180 tablet 2    mesalamine (LIALDA) 1 2 g EC tablet Take 4 tablets (4 8 g total) by mouth daily with breakfast (Patient not taking: Reported on 8/12/2020) 120 tablet 3     No current facility-administered medications for this visit  Review of Systems   Constitutional: Negative for activity change, appetite change, chills, diaphoresis, fatigue, fever and unexpected weight change  HENT: Negative for congestion, ear pain, postnasal drip, rhinorrhea, sinus pressure, sinus pain, sneezing, sore throat, tinnitus and voice change  Eyes: Negative for pain, redness and visual disturbance     Respiratory: Negative for cough, chest tightness, shortness of breath and wheezing  Cardiovascular: Negative for chest pain, palpitations and leg swelling  Gastrointestinal: Negative for abdominal pain, blood in stool, constipation, diarrhea, nausea and vomiting  Genitourinary: Negative for difficulty urinating, dysuria, frequency, hematuria and urgency  Musculoskeletal: Negative for arthralgias, back pain, gait problem, joint swelling, myalgias, neck pain and neck stiffness  Skin: Negative for color change, pallor, rash and wound  Neurological: Negative for dizziness, tremors, weakness, light-headedness and headaches  Psychiatric/Behavioral: Negative for dysphoric mood, self-injury, sleep disturbance and suicidal ideas  The patient is not nervous/anxious  Objective:  Vitals:    02/17/21 0731   BP: 152/82   Pulse: 63   Temp: (!) 94 °F (34 4 °C)   SpO2: 98%   Weight: 59 7 kg (131 lb 9 6 oz)   Height: 5' 2" (1 575 m)     Body mass index is 24 07 kg/m²  Physical Exam  Vitals signs reviewed  Constitutional:       General: He is not in acute distress  Appearance: He is well-developed  He is not diaphoretic  HENT:      Head: Normocephalic and atraumatic  Right Ear: Hearing, tympanic membrane, ear canal and external ear normal       Left Ear: Hearing, tympanic membrane, ear canal and external ear normal       Mouth/Throat:      Pharynx: Uvula midline  No oropharyngeal exudate  Eyes:      General: No scleral icterus  Right eye: No discharge  Left eye: No discharge  Conjunctiva/sclera: Conjunctivae normal    Neck:      Musculoskeletal: Neck supple  Thyroid: No thyromegaly  Vascular: No carotid bruit  Cardiovascular:      Rate and Rhythm: Normal rate and regular rhythm  Heart sounds: Normal heart sounds  No murmur  Pulmonary:      Effort: Pulmonary effort is normal  No respiratory distress  Breath sounds: Normal breath sounds  No wheezing     Abdominal:      General: Bowel sounds are normal  There is no distension  Palpations: Abdomen is soft  There is no mass  Tenderness: There is no abdominal tenderness  There is no guarding or rebound  Musculoskeletal: Normal range of motion  General: No tenderness  Lymphadenopathy:      Cervical: No cervical adenopathy  Skin:     General: Skin is warm and dry  Findings: No erythema or rash  Neurological:      Mental Status: He is alert and oriented to person, place, and time  Psychiatric:         Behavior: Behavior normal          Thought Content: Thought content normal          Judgment: Judgment normal          PHQ-9 Depression Screening    PHQ-9:   Frequency of the following problems over the past two weeks:      Little interest or pleasure in doing things: 0 - not at all  Feeling down, depressed, or hopeless: 0 - not at all  Trouble falling or staying asleep, or sleeping too much: 0 - not at all  Feeling tired or having little energy: 0 - not at all  Poor appetite or overeatin - not at all  Feeling bad about yourself - or that you are a failure or have let yourself or your family down: 0 - not at all  Trouble concentrating on things, such as reading the newspaper or watching television: 0 - not at all  Moving or speaking so slowly that other people could have noticed   Or the opposite - being so fidgety or restless that you have been moving around a lot more than usual: 0 - not at all  Thoughts that you would be better off dead, or of hurting yourself in some way: 0 - not at all  PHQ-2 Score: 0  PHQ-9 Score: 0

## 2021-02-17 NOTE — LETTER
Vaccination Request Form: [id number] aka SARS-CoV-2 ([place])      Date Requested: 21  Patient: Rebecca Beavers  Patient : 1945   Referring Provider: Linnette Mcclure PA-C       The above patient has informed us that they have had their   most recent COVID-19 aka SARS-CoV-2 ([place] or [place]) administered at your facility  Please   complete this form and attach all corresponding documentation  Date of Vaccine(s) Given  ______________________________    Lot Number(s) _______________________________________    Manufacture(s) ______________________________________    Dose Amount (s) _____________________________________    Expiration Date(s) ____________________________________    Comments __________________________________________________________  ____________________________________________________________________  ____________________________________________________________________  ____________________________________________________________________    Administering Facility  ________________________________________________    Vaccine Administered By (print name) ___________________________________      Form Completed By (print name) _______________________________________      Signature ___________________________________________________________      These reports are needed for  compliance    Please fax this completed form and a copy of the Vaccine Documents to our office located at [place] [address] as soon as possible to [phone]: Phone [phone]    We thank you for your assistance in treating our mutual patient

## 2021-02-17 NOTE — TELEPHONE ENCOUNTER
----- Message from Jacobo Boeck sent at 2/17/2021  7:53 AM EST -----  Regarding: Covid-19 vaccination-TFP  02/17/21 7:54 AM    Hello, our patient Antoine Ly has had Immunization(s) completed/performed  Please assist in updating the patient chart by making an External outreach to [employer] located in [place], [place]  The date of service is within past few months      Thank you,  Jacobo Boeck  [facility] JAYSON ROSEN

## 2021-02-17 NOTE — TELEPHONE ENCOUNTER
Upon review of the In Basket request and the patient's chart, initial outreach has been made via fax, please see Contacts section for details       Thank you  Elina Camp

## 2021-02-18 ENCOUNTER — VBI (OUTPATIENT)
Dept: ADMINISTRATIVE | Facility: OTHER | Age: 76
End: 2021-02-18

## 2021-02-22 NOTE — TELEPHONE ENCOUNTER
As a follow-up, a second attempt has been made for outreach via fax, please see Contacts section for details      Thank you  Paige Garza

## 2021-02-24 ENCOUNTER — TELEPHONE (OUTPATIENT)
Dept: UROLOGY | Facility: MEDICAL CENTER | Age: 76
End: 2021-02-24

## 2021-02-24 DIAGNOSIS — N32.81 OVERACTIVE BLADDER: Primary | ICD-10-CM

## 2021-02-24 DIAGNOSIS — F41.1 GAD (GENERALIZED ANXIETY DISORDER): ICD-10-CM

## 2021-02-24 DIAGNOSIS — F51.05 INSOMNIA SECONDARY TO ANXIETY: ICD-10-CM

## 2021-02-24 DIAGNOSIS — F41.9 INSOMNIA SECONDARY TO ANXIETY: ICD-10-CM

## 2021-02-24 RX ORDER — ESCITALOPRAM OXALATE 20 MG/1
20 TABLET ORAL DAILY
Qty: 30 TABLET | Refills: 2 | Status: SHIPPED | OUTPATIENT
Start: 2021-02-24 | End: 2021-05-24 | Stop reason: SDUPTHER

## 2021-02-24 RX ORDER — ALPRAZOLAM 0.25 MG/1
0.25 TABLET ORAL
Qty: 30 TABLET | Refills: 0 | Status: SHIPPED | OUTPATIENT
Start: 2021-02-24 | End: 2021-03-24 | Stop reason: SDUPTHER

## 2021-02-24 NOTE — TELEPHONE ENCOUNTER
This is a patient of Dr Mera Parker and seen by Julio Sprague in [facility]  Patient called and said Myrbetriq is not working and he has been on for 3 months  Patient goes to [facility] in [facility]  Please call patient back at [phone]

## 2021-02-25 NOTE — TELEPHONE ENCOUNTER
Called and spoke with patient  Informed patient that dosage will increase to 50mg  Patient will go  and start taking

## 2021-02-25 NOTE — TELEPHONE ENCOUNTER
We can increase his Myrbetriq to 50 mg daily  We will re-evaluate his symptoms with the dose adjustment at his upcoming appointment in April

## 2021-03-12 ENCOUNTER — OFFICE VISIT (OUTPATIENT)
Dept: OBGYN CLINIC | Facility: CLINIC | Age: 76
End: 2021-03-12
Payer: MEDICARE

## 2021-03-12 ENCOUNTER — APPOINTMENT (OUTPATIENT)
Dept: RADIOLOGY | Facility: MEDICAL CENTER | Age: 76
End: 2021-03-12
Payer: MEDICARE

## 2021-03-12 VITALS
HEART RATE: 73 BPM | WEIGHT: 135 LBS | SYSTOLIC BLOOD PRESSURE: 158 MMHG | HEIGHT: 62 IN | DIASTOLIC BLOOD PRESSURE: 86 MMHG | BODY MASS INDEX: 24.84 KG/M2

## 2021-03-12 DIAGNOSIS — M17.11 PRIMARY LOCALIZED OSTEOARTHRITIS OF RIGHT KNEE: Primary | ICD-10-CM

## 2021-03-12 DIAGNOSIS — M25.561 ACUTE PAIN OF RIGHT KNEE: ICD-10-CM

## 2021-03-12 PROCEDURE — 99213 OFFICE O/P EST LOW 20 MIN: CPT | Performed by: ORTHOPAEDIC SURGERY

## 2021-03-12 PROCEDURE — 20610 DRAIN/INJ JOINT/BURSA W/O US: CPT | Performed by: ORTHOPAEDIC SURGERY

## 2021-03-12 PROCEDURE — 73562 X-RAY EXAM OF KNEE 3: CPT

## 2021-03-12 RX ORDER — IBUPROFEN 200 MG
200 TABLET ORAL EVERY 6 HOURS PRN
COMMUNITY

## 2021-03-12 RX ORDER — LIDOCAINE HYDROCHLORIDE 10 MG/ML
4 INJECTION, SOLUTION INFILTRATION; PERINEURAL
Status: COMPLETED | OUTPATIENT
Start: 2021-03-12 | End: 2021-03-12

## 2021-03-12 RX ORDER — TRIAMCINOLONE ACETONIDE 40 MG/ML
80 INJECTION, SUSPENSION INTRA-ARTICULAR; INTRAMUSCULAR
Status: COMPLETED | OUTPATIENT
Start: 2021-03-12 | End: 2021-03-12

## 2021-03-12 RX ADMIN — TRIAMCINOLONE ACETONIDE 80 MG: 40 INJECTION, SUSPENSION INTRA-ARTICULAR; INTRAMUSCULAR at 08:54

## 2021-03-12 RX ADMIN — LIDOCAINE HYDROCHLORIDE 4 ML: 10 INJECTION, SOLUTION INFILTRATION; PERINEURAL at 08:54

## 2021-03-12 NOTE — PATIENT INSTRUCTIONS
Decision Aid for Knee Osteoarthritis   AMBULATORY CARE:   What you need to know about decisions for knee osteoarthritis:  You can help make decisions about being screened for osteoarthritis  You can also help plan treatment if osteoarthritis is found with screening, or you develop it  Osteoarthritis screening is a test done to find osteoarthritis early  Screening is different from diagnosis because screening is used when you first start to have signs or symptoms  This means management or treatment can start early to help prevent joint damage  Treatments include medicine and surgery, but you can also manage osteoarthritis with lifestyle changes  What you need to know about knee osteoarthritis:   · Osteoarthritis is a condition that causes tissue between bones to wear away  This can happen slowly over time or quickly because of an injury  The bones rub together when you move  This causes pain and can limit mobility  · Osteoarthritis is the most common form of arthritis  About 30 million people in the [place] have osteoarthritis  · Osteoarthritis is more common in women than in men  Other risk factors include being overweight or having a family history of osteoarthritis  · Talk to your healthcare provider if you think you have signs or symptoms of osteoarthritis  Examples include joint pain with movement, tender or stiff joint, or a grating feeling with movement  You may also feel hard lumps around the joint, called bone spurs  How to know if you are a good candidate for osteoarthritis screening:  Screening may be helpful for you if any of the following is true:  · You are 50 years or older  · You are overweight or obese  · You do work or play a sport that puts repeated stress on your knee joint  · You have a family history of osteoarthritis  · You had a knee joint injury, even if it happened many years ago  · You were born with a joint or cartilage problem      · You have mild signs or symptoms of osteoarthritis  · Your symptoms are starting to affect your ability to do your daily activities  How osteoarthritis screening is done:  No test is used to diagnose osteoarthritis  Your healthcare provider will check the following:  · How much pain, tenderness, or inflammation you have in the joint    · Range of motion in the joint (how far it can move in every direction)    · Your ability to walk without pain, or how your symptoms changed the way you walk    · The strength of muscles around the joint    Benefits and risks of screening:  Talk with your healthcare provider about the risks and benefits of screening:  · Benefits  include finding osteoarthritis early, when you can manage the condition  You may be able to slow the progress of joint damage by losing weight or exercising more  You can also help make a treatment plan that you can change over time as needed  · Risks  include a false belief that you will not develop osteoarthritis  Your screening test may find that you do not have osteoarthritis  This can be because signs and symptoms are mild  You can still develop more severe signs and symptoms over time  It is important to talk with your healthcare provider about how often to have screening  Questions to ask your healthcare provider to help you make decisions about screening:   · How high is my risk for osteoarthritis? · How often do I need to have screening? · Where is the screening done? · Do I need to do anything to get ready to have screening? What happens after osteoarthritis screening: You will meet with your healthcare provider to go over the results of your screening  You, your family or caregiver, and your healthcare provider can talk about your treatment options  Together you can decide which treatment is right for you   You may need more tests to diagnose anything that showed up on the screening test  Common tests include an x-ray or CT scan to check the amount of tissue between bones or to find bone spurs  How osteoarthritis is treated, and the benefits of treatment:   · Lifestyle changes  include weight loss and exercise  Weight loss can help take pressure off the joint  Exercise can help build muscles around the joint  This helps build stability and strength  Your healthcare provider, a dietitian, or a physical therapist can help you make nutrition and exercise plans  · Devices  can help you stay active and relieve your symptoms  Shoe inserts support your joints and take pressure off the joints  This can help reduce pain that happens when you stand or walk  A knee brace can add stability to your knee when you walk  A cane can help take weight off your knee as you walk  · Medicines  include acetaminophen, NSAIDs, and certain antidepressants  NSAIDs, such as ibuprofen, and acetaminophen can be taken as a pill or rubbed into your skin in a cream  Acetaminophen and NSAIDs help relieve pain  NSAIDs also help reduce inflammation  Both medicines are available without a prescription  Do not take these medicines without talking to your healthcare provider first  Stronger forms of these medicines are available with a prescription  Antidepressants are only available with a prescription  · Therapy  includes physical and occupational therapy  A physical therapist can help you strengthen muscles around the joint and increase your range of motion  An occupational therapist can help you find easier ways to do your daily activities  For example, you may be shown how to climb stairs without putting stress on your knee  · Steroid medicine  may be injected into the knee area if your symptoms become worse  This can reduce inflammation and relieve pain  · Surgery  may be done if other treatments do not work and your symptoms become severe  Debridement is surgery to clean pieces of bone and cartilage out of the joint  This is done if you have knee buckling or locking   Osteotomy is surgery to move bones into a different position so they do not rub against other bones  Surgery called arthroplasty is used to remove the damaged joint and replace it with an artificial joint  Surgery can relieve pain by removing the problem that is causing your pain  Risks of osteoarthritis treatment:   · Lifestyle changes  will not reverse the damage to the tissue between your joints  It may prevent symptoms from getting worse  It may also be difficult to exercise if you have severe knee pain  · Medicines  must be taken in limited doses  Acetaminophen can cause liver damage, and NSAIDs can cause kidney damage  The pain may come back before you can take another dose  Antidepressants can cause certain side effects  · Steroid injections  are usually limited to about 4 each year  This is because the medicine can cause joint damage over time  Steroids can also increase your risk for infections, cause changes in your mood, and increase blood sugar levels  · Surgery  increases your risk for an infection or blood clot  Nerves, blood vessels, or tissues around the knee can be damaged during an osteotomy  An artificial joint may wear out over time  It may also become loose  It will need to be replaced if it wears out or comes loose  You will need to do knee exercises to prevent scar tissue from building up in your knee  This can be painful  Questions to ask your healthcare provider to help you make decisions about treatment:   · How will I know if signs and symptoms are becoming severe enough to need treatment? · How long will it take for each treatment option to help my knee pain get better? · Which pain medicines will work best for me? · Am I a good candidate for steroid injections? · Am I a good candidate for knee replacement surgery? · How long is recovery from knee replacement surgery? · Will I need to have more surgery over time?     · How often will I need to do knee exercises to prevent scar tissue from building up? © Copyright [facility] [address] Information is for End User's use only and may not be sold, redistributed or otherwise used for commercial purposes  All illustrations and images included in CareNotes® are the copyrighted property of A D A M [employer]  or Lamberto Pederson  The above information is an  only  It is not intended as medical advice for individual conditions or treatments  Talk to your doctor, nurse or pharmacist before following any medical regimen to see if it is safe and effective for you

## 2021-03-12 NOTE — PROGRESS NOTES
Chief Complaint  Right knee pain    History Of Presenting Illness  Stefano Harrison 1945 presents with  Right knee pain  Patient has right knee pain on and off for many years gradually getting worse  Patient has always been bowlegged all his life  Patient was osteoarthritis in other joints suspicion of the shoulder and the right thumb  Right thumb was treated with steroid injection with good outcome so far  Right knee pain mainly in the medial side  Associated with deformity  Gradually getting worse  Does not disturb night sleep  Patient is well otherwise  Patient unfortunately lost his wife of longstanding in July of last year  Current Medications  Current Outpatient Medications   Medication Sig Dispense Refill    ALPRAZolam (XANAX) 0 25 mg tablet Take 1 tablet (0 25 mg total) by mouth daily at bedtime as needed for anxiety or sleep 30 tablet 0    cyanocobalamin (VITAMIN B-12) 1,000 mcg tablet Take 1,000 mcg by mouth daily   escitalopram (LEXAPRO) 20 mg tablet Take 1 tablet (20 mg total) by mouth daily 30 tablet 2    ibuprofen (MOTRIN) 200 mg tablet Take 200 mg by mouth every 6 (six) hours as needed for mild pain      losartan (COZAAR) 50 mg tablet Take 1 tablet (50 mg total) by mouth daily 90 tablet 0    Mirabegron ER 50 MG TB24 Take 1 tablet (50 mg total) by mouth daily 30 tablet 1    Misc Natural Products (OSTEO BI-FLEX ADV TRIPLE ST PO) Take by mouth      Multiple Vitamin (MULTIVITAMIN) tablet Take 1 tablet by mouth daily      sulfaSALAzine (AZULFIDINE-EN) 500 mg EC tablet Take 2 tablets (1,000 mg total) by mouth 2 (two) times a day 120 tablet 3    VITAMIN D, CHOLECALCIFEROL, PO Take 2,000 Units by mouth daily        mesalamine (ASACOL) 800 MG EC tablet Take 2 tablets (1,600 mg total) by mouth 3 (three) times a day (Patient not taking: Reported on 8/12/2020) 180 tablet 2    mesalamine (LIALDA) 1 2 g EC tablet Take 4 tablets (4 8 g total) by mouth daily with breakfast (Patient not taking: Reported on 8/12/2020) 120 tablet 3     No current facility-administered medications for this visit          Current Problems    Active Problems:   Patient Active Problem List    Diagnosis Date Noted    Current [facility] episode of major [facility] disorder [facility] 02/17/2021    Diarrhea 06/26/2020    Weight loss, unintentional 06/26/2020    Cro[facility]'s disease with rectal bleeding [facility] ) 06/12/2020    Primary osteoarthritis of first carpometacarpal joint of right hand 11/08/2017    Arthritis 10/07/2017    Rotator cuff tear arthropathy of right shoulder 08/28/2017    Primary osteoarthritis of both shoulders 06/16/2017    Dyslipidemia 10/28/2014    Depression 09/27/2012    Malignant melanoma of skin [facility] ) 09/27/2012    Erectile dysfunction of non-organic origin 05/21/2012    Essential hypertension 05/18/2012         Review of Systems:    General: negative for - chills, fatigue, fever,  weight gain or weight loss  Psychological: negative for - anxiety, behavioral disorder, concentration difficulties  Ophthalmic: negative for - blurry vision, decreased vision, double vision,      Past Medical History:   Past Medical History:   Diagnosis Date    Acute gouty arthropathy     Last Assessed: 6/9/2015    Arthritis     Bacterial pneumonia     Last Assessed: 6/21/2016    Depression     Fracture of rib, closed     Last Assessed: 6/21/2016    Gout     Hyperlipidemia     Hypertension     [facility]anoma ([facility] )        Past Surgical History:   Past Surgical History:   Procedure Laterality Date    EYE SURGERY      MALIGNANT SKIN LESION EXCISION      MOHS SURGERY      shaving of lesion moh's technique     [place] SHLDR ARTHROSCOP,LYSE ADHESNS Right 8/28/2017    Procedure: ARTHROSCOPY [facility] UNDER GENERAL ANESTHESIA ,LYSIS OF ADHESIONS, CAPSULECTOMY;  Surgeon: Olesya Dhaliwal MD;  Location: [facility];  Service: Orthopedics       Family History:  Family history reviewed and non-contributory  Family History   Problem Relation Age of Onset    Cancer Mother     Aneurysm Father     No Known Problems Sister     Aortic aneurysm Family        Social History:  Social History     Socioeconomic History    Marital status:       Spouse name: None    Number of children: None    Years of education: None    Highest education level: None   Occupational History    Occupation: Retired    Social Needs    Financial resource strain: None    Food insecurity     Worry: None     Inability: None    Transportation needs     Medical: None     Non-medical: None   Tobacco Use    Smoking status: Never Smoker    Smokeless tobacco: Never Used   Substance and Sexual Activity    Alcohol use: Not Currently     Comment: rare, being a social drinker     Drug use: No    Sexual activity: Yes   Lifestyle    Physical activity     Days per week: None     Minutes per session: None    Stress: None   Relationships    Social connections     Talks on phone: None     Gets together: None     Attends Christian service: None     Active member of club or organization: None     Attends meetings of clubs or organizations: None     Relationship status: None    Intimate partner violence     Fear of current or ex partner: None     Emotionally abused: None     Physically abused: None     Forced sexual activity: None   Other Topics Concern    None   Social History Narrative    None       Allergies:   No Known Allergies        Physical ExaminationBP 158/86   Pulse 73   Ht 5' 2" (1 575 m)   Wt 61 2 kg (135 lb)   BMI 24 69 kg/m²   Gen: Alert and oriented to person, place, time  HEENT: EOMI, eyes clear, moist mucus membranes, hearing intact      Orthopedic Exam    Bilateral knee varus deformity was 10° fixed flexion deformity of 10° on the right with further free flexion to 100    joint line tenderness and crepitus especially the medial joint line   calf is soft non -tender no distal deficits   radiographs show severe osteoarthritis with varus deformity bone-on-bone in the medial compartment          Impression   symptomatic right knee osteoarthritis          Plan     discussed treatment with the patient   right knee injected with steroid and local anesthetic   patient will ice, use over-the-counter pain medication, do home exercise program and start physical therapy   follow-up in 3 months if no better we might request hyaluronic acid injections by my own feeling is patient is heading towards a total knee arthroplasty  Large joint arthrocentesis: R knee  Universal Protocol:  Risks and benefits: risks, benefits and alternatives were discussed  Consent given by: patient  Time out: Immediately prior to procedure a "time out" was called to verify the correct patient, procedure, equipment, support staff and site/side marked as required  Timeout called at: 3/12/2021 8:53 AM   Site marked: the operative site was marked  Patient identity confirmed: verbally with patient    Supporting Documentation  Indications: pain and joint swelling   Procedure Details  Location: knee - R knee  Preparation: Patient was prepped and draped in the usual sterile fashion  Needle size: 22 G  Ultrasound guidance: no  Approach: anterolateral  Medications administered: 4 mL lidocaine 1 %; 80 mg triamcinolone acetonide 40 mg/mL    Patient tolerance: patient tolerated the procedure well with no immediate complications  Dressing:  Sterile dressing applied        Kalie Quintero MD        Portions of the record may have been created with voice recognition software  Occasional wrong word or "sound a like" substitutions may have occurred due to the inherent limitations of voice recognition software  Read the chart carefully and recognize, using context, where substitutions have occurred

## 2021-03-24 DIAGNOSIS — F41.9 INSOMNIA SECONDARY TO ANXIETY: ICD-10-CM

## 2021-03-24 DIAGNOSIS — F51.05 INSOMNIA SECONDARY TO ANXIETY: ICD-10-CM

## 2021-03-24 RX ORDER — ALPRAZOLAM 0.25 MG/1
0.25 TABLET ORAL
Qty: 30 TABLET | Refills: 0 | Status: SHIPPED | OUTPATIENT
Start: 2021-03-24 | End: 2021-04-26 | Stop reason: SDUPTHER

## 2021-03-24 NOTE — TELEPHONE ENCOUNTER
Controlled Substance Review    [place]MP or [place]  reviewed: No red flags were identified; safe to proceed with prescription  [place] Given     PDMP Review       Value Time User    PDMP Reviewed  Yes 3/24/2021 11:19 AM Sunita Anne PA-C

## 2021-04-08 NOTE — PROGRESS NOTES
Assessment and plan:       1  nocturia  - x 5  - PVR 14 ml  - started on oxybutynin extended release 5 mg q h s  at his previous visit, was not helping  - was switched to myrbetriq by pcp at 50 mg  - daytime frequency hs improved  - increased stress since wife  on 2020, symptoms began 2020  - recommended stress management/ exercise such as walking  - Recommended kegel exercises 3 times a day, handout provided, instructed on how to perform  -does not drink alcohol or soda, eats a lot of process foods since wife's death  -has not been drinking water except sips with medications  -recommend increasing water intake  -will monitor symptoms after changes and call back in 3 months  -consider addition of vesicare or trospium for no improvement after 3 months of lifestyle changes  - follow-up in 6 months    2  Microscopic hematuria  - CT renal protocol 2020 5 mm nonobstructing right renal calculus  - cystoscopy 10/2020  unremarkable    3  Prostate cancer screening  - PSA 1 3 ( 2020)  - VETO  Up-to-date as of 10/13/2020  - follow-up in 6 months with PSA for VETO    4  Nephrolithiasis  - CT renal protocol 5 mm nonobstructing right renal calculus 2020  - ultrasound kidney and bladder and KUB in 1 year    [facility]    History of Present Illness     Brendon Arndt is a 68 y o  Male presents for six-month follow-up  He was last seen on 10/13/2020 at which point he had a cystoscopy done for microscopic hematuria  He established care 2020 for microscopic hematuria and nocturia  He has no daytime urinary bother but reports urgency and pressure feeling he has to get up to urinate several times overnight but is small volumes  He has no dysuria and no gross hematuria  At onset of symptoms 2020 patient was evaluated at urgent care a UA showed small leukocytes and moderate blood     He also reported some anxiety and insomnia at that time as his wife had recently  in the July of 2020 after prolonged illness  He had some episodes of dizziness that resolved after decreasing losartan and alpha blockers were not recommended at his previous visit due to this  He also has a history of Crohn's disease on sulfasalazine  Laboratory     Lab Results   Component Value Date    BUN 18 08/05/2020    CREATININE 0 87 08/05/2020       No components found for: GFR    Lab Results   Component Value Date    GLUCOSE 84 10/31/2014    CALCIUM 9 4 08/05/2020     10/31/2014    K 4 2 08/05/2020    CO2 31 08/05/2020     (H) 08/05/2020       Lab Results   Component Value Date    WBC 8 05 08/05/2020    HGB 11 9 (L) 08/05/2020    HCT 38 5 08/05/2020    MCV 93 08/05/2020     08/05/2020       Lab Results   Component Value Date    PSA 1 3 06/12/2020    PSA 1 3 09/13/2019    PSA 1 6 11/12/2018       Recent Results (from the past 1 hour(s))   POCT urine dip    Collection Time: 04/13/21 11:09 AM   Result Value Ref Range    LEUKOCYTE ESTERASE,UA n     NITRITE,UA n     SL AMB POCT UROBILINOGEN n     POCT URINE PROTEIN n      PH,UA 5 0     BLOOD,UA tr     SPECIFIC GRAVITY,UA 1 020     KETONES,UA n     BILIRUBIN,UA n     GLUCOSE, UA n      COLOR,UA yellow     CLARITY,UA clear    POCT Measure PVR    Collection Time: 04/13/21 11:10 AM   Result Value Ref Range    POST-VOID RESIDUAL VOLUME, ML POC 14 mL       @RESULT(URINEMICROSCOPIC)@    @RESULT(URINECULTURE)@    Radiology     ADDENDUM:     Please note the impression should include that left lower lobe pulmonary nodules measuring up to 5 mm are identified  Based on current Fleischner Society 2017 Guidelines on incidental pulmonary nodule, no routine follow-up is needed if the patient is   considered low risk for lung cancer    If the patient is considered high risk for lung cancer, 12 month follow-up non-contrast chest CT is recommended       Addended by Gus Gilbert MD on 8/25/2020  9:27 AM      Study Result    CT ABDOMEN AND PELVIS WITH AND WITHOUT IV CONTRAST     INDICATION:   R31 29: Other microscopic hematuria      COMPARISON:  None      TECHNIQUE: Initial CT of the abdomen and pelvis was performed without intravenous contrast   Subsequent dynamic CT evaluation of the abdomen and pelvis was performed after the administration of intravenous contrast in both nephrographic and delayed   phases after the administration of intravenous contrast   Axial, sagittal, and coronal 2D reformatted images were created from the source data and submitted for interpretation       Radiation dose length product (DLP) for this visit:  675 3 mGy-cm   This examination, like all CT scans performed in the [facility], was performed utilizing techniques to minimize radiation dose exposure, including the use of iterative   reconstruction and automated exposure control      IV Contrast:  100 mL of iohexol (OMNIPAQUE)  Enteric Contrast:  Enteric contrast was not administered      FINDINGS:     ABDOMEN     RIGHT KIDNEY AND URETER:  No solid renal mass  No detectable urothelial mass  Subcentimeter right renal hypodensity is identified which is too small to characterize but likely represents a cyst   Right parapelvic renal cyst is present  No hydronephrosis or hydroureter  5 mm nonobstructing right renal calculus is present  No perinephric collection      LEFT KIDNEY AND URETER:  No solid renal mass  No detectable urothelial mass  Left parapelvic renal cyst is noted  No hydronephrosis or hydroureter  No urinary tract calculi  No perinephric collection      URINARY BLADDER:  No bladder wall mass  No calculi         LOWER CHEST:  4 mm left lower lobe pulmonary nodules noted series 3 image 12   5 mm left lower lobe nodule noted series 3 image 14      LIVER/BILIARY TREE:  There are one or more hepatic simple cyst(s) present  No CT evidence of suspicious solid hepatic mass  Normal hepatic contours  No biliary dilatation      GALLBLADDER:  No calcified gallstones   No pericholecystic inflammatory change      SPLEEN:  Unremarkable      PANCREAS:  Unremarkable      ADRENAL GLANDS:  Unremarkable      STOMACH AND BOWEL:  There is colonic diverticulosis without evidence of acute diverticulitis      ABDOMINOPELVIC CAVITY:  No ascites  No free intraperitoneal air  No lymphadenopathy      VESSELS:  Unremarkable for patient's age      PELVIS     REPRODUCTIVE ORGANS:  Unremarkable for patient's age      APPENDIX: No findings to suggest appendicitis      ABDOMINAL WALL/INGUINAL REGIONS:  Unremarkable      OSSEOUS STRUCTURES:  No acute fracture or destructive osseous lesion      IMPRESSION:     5 mm nonobstructing right renal calculus      Colonic diverticulosis              Review of Systems     Review of Systems   Constitutional: Negative for activity change, appetite change, chills, fatigue, fever and unexpected weight change  HENT: Negative for facial swelling  Eyes: Negative for discharge  Respiratory: Negative  Negative for cough and shortness of breath  Cardiovascular: Negative for chest pain and leg swelling  Gastrointestinal: Negative  Negative for abdominal distention, abdominal pain, constipation, diarrhea, nausea and vomiting  Endocrine: Negative  Genitourinary: Positive for frequency and urgency  Negative for decreased urine volume, difficulty urinating, dysuria, enuresis, flank pain, genital sores and hematuria  Musculoskeletal: Negative for back pain and myalgias  Skin: Negative for pallor and rash  Allergic/Immunologic: Negative  Negative for immunocompromised state  Neurological: Negative for facial asymmetry and speech difficulty  Psychiatric/Behavioral: Negative for agitation and confusion  Allergies     No Known Allergies    Physical Exam     Physical Exam  Vitals signs reviewed  Constitutional:       General: He is not in acute distress  Appearance: Normal appearance  He is not ill-appearing, toxic-appearing or diaphoretic  HENT:      Head: Normocephalic and atraumatic  Eyes:      General: No scleral icterus  Neck:      Musculoskeletal: Normal range of motion  Cardiovascular:      Rate and Rhythm: Normal rate  Pulmonary:      Effort: Pulmonary effort is normal  No respiratory distress  Abdominal:      General: Abdomen is flat  There is no distension  Palpations: Abdomen is soft  Tenderness: There is no abdominal tenderness  There is no guarding or rebound  Musculoskeletal:         General: No swelling  Skin:     General: Skin is warm and dry  Coloration: Skin is not jaundiced or pale  Findings: No rash  Neurological:      General: No focal deficit present  Mental Status: He is alert and oriented to person, place, and time  Gait: Gait normal    Psychiatric:         Mood and Affect: Mood normal          Behavior: Behavior normal          Thought Content: Thought content normal          Judgment: Judgment normal          Vital Signs     Vitals:    04/13/21 1104   BP: 130/70   Pulse: 61   Weight: 62 kg (136 lb 11 oz)   Height: 5' 2" (1 575 m)       Current Medications       Current Outpatient Medications:     ALPRAZolam (XANAX) 0 25 mg tablet, Take 1 tablet (0 25 mg total) by mouth daily at bedtime as needed for anxiety or sleep, Disp: 30 tablet, Rfl: 0    cyanocobalamin (VITAMIN B-12) 1,000 mcg tablet, Take 1,000 mcg by mouth daily  , Disp: , Rfl:     escitalopram (LEXAPRO) 20 mg tablet, Take 1 tablet (20 mg total) by mouth daily, Disp: 30 tablet, Rfl: 2    losartan (COZAAR) 50 mg tablet, Take 1 tablet (50 mg total) by mouth daily, Disp: 90 tablet, Rfl: 0    Mirabegron ER 50 MG TB24, Take 1 tablet (50 mg total) by mouth daily, Disp: 30 tablet, Rfl: 1    Misc Natural Products (OSTEO BI-FLEX ADV TRIPLE ST PO), Take by mouth, Disp: , Rfl:     Multiple Vitamin (MULTIVITAMIN) tablet, Take 1 tablet by mouth daily, Disp: , Rfl:     sulfaSALAzine (AZULFIDINE-EN) 500 mg EC tablet, Take 2 tablets (1,000 mg total) by mouth 2 (two) times a day, Disp: 120 tablet, Rfl: 3    VITAMIN D, CHOLECALCIFEROL, PO, Take 2,000 Units by mouth daily  , Disp: , Rfl:     ibuprofen (MOTRIN) 200 mg tablet, Take 200 mg by mouth every 6 (six) hours as needed for mild pain, Disp: , Rfl:     mesalamine (ASACOL) 800 MG EC tablet, Take 2 tablets (1,600 mg total) by mouth 3 (three) times a day (Patient not taking: Reported on 8/12/2020), Disp: 180 tablet, Rfl: 2    mesalamine (LIALDA) 1 2 g EC tablet, Take 4 tablets (4 8 g total) by mouth daily with breakfast (Patient not taking: Reported on 8/12/2020), Disp: 120 tablet, Rfl: 3    valACYclovir (VALTREX) 1,000 mg tablet, TAKE 2 TABLETS BY MOUTH AT ONSET THEN 2 TABLETS AFTER 12 HOURS, Disp: , Rfl:     Active Problems     Patient Active Problem List   Diagnosis    Rotator cuff tear arthropathy of right shoulder    Arthritis    Depression    Dyslipidemia    Erectile dysfunction of non-organic origin    Essential hypertension    [facility]    Primary osteoarthritis of both shoulders    Primary osteoarthritis of first carpometacarpal joint of right hand    Crohn's disease with rectal bleeding (HCC)    Diarrhea    Weight loss, unintentional    Current moderate episode of major depressive disorder without prior episode [facility])       Past Medical History     Past Medical History:   Diagnosis Date    Acute gouty arthropathy     Last Assessed: 6/9/2015    Arthritis     Bacterial pneumonia     Last Assessed: 6/21/2016    Depression     Fracture of rib, closed     Last Assessed: 6/21/2016    Gout     Hyperlipidemia     Hypertension     Melanoma ([facility] )        Surgical History     Past Surgical History:   Procedure Laterality Date    EYE SURGERY      MALIGNANT SKIN LESION EXCISION      MOHS SURGERY      shaving of lesion moh's technique     [place] SHLDR ARTHROSCOP,LYSE ADHESNS Right 8/28/2017    Procedure: ARTHROSCOPY SHOULDER,MANIPULATION UNDER GENERAL ANESTHESIA ,LYSIS OF ADHESIONS, CAPSULECTOMY;  Surgeon: Lula Pascual MD;  Location: [facility];  Service: Orthopedics       Family History     Family History   Problem Relation Age of Onset    Cancer Mother     Aneurysm Father     No Known Problems Sister     Aortic aneurysm Family        Social History     Social History     Social History     Tobacco Use   Smoking Status Never Smoker   Smokeless Tobacco Never Used       Past Surgical History:   Procedure Laterality Date    EYE SURGERY      MALIGNANT SKIN LESION EXCISION      MOHS SURGERY      shaving of lesion moh's technique     [place] SHLDR ARTHROSCOP,LYSE ADHESNS Right 8/28/2017    Procedure: ARTHROSCOPY [facility] UNDER GENERAL ANESTHESIA ,LYSIS OF ADHESIONS, CAPSULECTOMY;  Surgeon: Lula Pascual MD;  Location: [facility];  Service: Orthopedics         The following portions of the patient's history were reviewed and updated as appropriate: allergies, current medications, past family history, past medical history, past social history, past surgical history and problem list    Please note :  Voice dictation software has been used to create this document  There may be inadvertent transcription errors      [id number] [address]

## 2021-04-12 DIAGNOSIS — I10 ESSENTIAL HYPERTENSION: ICD-10-CM

## 2021-04-12 RX ORDER — LOSARTAN POTASSIUM 50 MG/1
50 TABLET ORAL DAILY
Qty: 90 TABLET | Refills: 0 | Status: SHIPPED | OUTPATIENT
Start: 2021-04-12 | End: 2021-07-12 | Stop reason: SDUPTHER

## 2021-04-13 ENCOUNTER — OFFICE VISIT (OUTPATIENT)
Dept: UROLOGY | Facility: CLINIC | Age: 76
End: 2021-04-13
Payer: MEDICARE

## 2021-04-13 VITALS
HEART RATE: 61 BPM | BODY MASS INDEX: 25.15 KG/M2 | WEIGHT: 136.69 LBS | DIASTOLIC BLOOD PRESSURE: 70 MMHG | HEIGHT: 62 IN | SYSTOLIC BLOOD PRESSURE: 130 MMHG

## 2021-04-13 DIAGNOSIS — Z12.5 PROSTATE CANCER SCREENING: ICD-10-CM

## 2021-04-13 DIAGNOSIS — N32.81 OVERACTIVE BLADDER: Primary | ICD-10-CM

## 2021-04-13 LAB
POST-VOID RESIDUAL VOLUME, ML POC: 14 ML
SL AMB  POCT GLUCOSE, UA: ABNORMAL
SL AMB LEUKOCYTE ESTERASE,UA: ABNORMAL
SL AMB POCT BILIRUBIN,UA: ABNORMAL
SL AMB POCT BLOOD,UA: ABNORMAL
SL AMB POCT CLARITY,UA: CLEAR
SL AMB POCT COLOR,UA: YELLOW
SL AMB POCT KETONES,UA: ABNORMAL
SL AMB POCT NITRITE,UA: ABNORMAL
SL AMB POCT PH,UA: 5
SL AMB POCT SPECIFIC GRAVITY,UA: 1.02
SL AMB POCT URINE PROTEIN: ABNORMAL
SL AMB POCT UROBILINOGEN: ABNORMAL

## 2021-04-13 PROCEDURE — 81002 URINALYSIS NONAUTO W/O SCOPE: CPT | Performed by: NURSE PRACTITIONER

## 2021-04-13 PROCEDURE — 51798 US URINE CAPACITY MEASURE: CPT | Performed by: NURSE PRACTITIONER

## 2021-04-13 PROCEDURE — 99213 OFFICE O/P EST LOW 20 MIN: CPT | Performed by: NURSE PRACTITIONER

## 2021-04-13 RX ORDER — VALACYCLOVIR HYDROCHLORIDE 1 G/1
TABLET, FILM COATED ORAL
COMMUNITY
Start: 2021-03-30

## 2021-04-13 NOTE — PATIENT INSTRUCTIONS
BLADDER HEALTH    WHAT IS CONSIDERED NORMAL?  The average bladder can hold about 2 cups of urine before it needs to be emptied   The normal range of voiding urine is 6 to 8 times during a 24 hour period  As we get older, our bladder capacity can get smaller and we may need to pass urine more frequently but usually not more than every 2 hours   Urine should flow easily without discomfort in a good, steady stream until the bladder is empty  No pushing or straining is necessary to empty the bladder   An urge is a signal that you feel as the bladder stretches to fill with urine  Urges can be felt even if the bladder is not full  Urges are not commands to go to the toilet, merely a signal and can be controlled  WHAT ARE GOOD BLADDER HABITS?  Take your time when emptying your bladder  Dont strain or push to empty your bladder  Make sure you empty your bladder completely each time you pass urine  Do not rush the process   Consistently ignoring the urge to go (waiting more than 4 hours between toileting) or urinating too infrequently may be convenient but not healthy for your bladder   Avoid going to the toilet just in case or more often than every 2 hours  It is usually not necessary to go when you feel the first urge  Try to go only when your bladder is full  Urgency and frequency of urination can be improved by retraining the bladder and spacing your fluid intake throughout the day  Practice good toilet habits  Dont let your bladder control your life  TIPS TO MAINTAIN GOOD BLADDER HABITS   Maintain a good fluid intake  Depending on your body size and environment, drink 6 -8 cups (8 oz each) of fluid per day unless otherwise advised by your doctor  Not enough fluid creates a foul odor and dark color of the urine     Limit the amount of caffeine (coffee, cola, chocolate or tea) and citrus foods that you consume as these foods can be associated with increased sensation of urinary urgency and frequency   Limit the amount of alcohol you drink  Alcohol increases urine production and makes it difficult for the brain to coordinate bladder control   Avoid constipation by maintaining a balanced diet of dietary fiber   Cigarette smoking is also irritating to the bladder surface and is associated with bladder cancer  In addition, the coughing associated with smoking may lead to increased incontinent episodes because of the increased pressure  HOW DIET CAN AFFECT YOUR BLADDER  Although there is no particular "diet" that can cure bladder control, there are certain dietary suggestions you can use to help control the problem  There are 2 points to consider when evaluating how your habits and diet may affect your bladder:    1  Foods and fluids can irritate the bladder  Some foods and beverages are thought to contribute to bladder leakage and irritability  However their effect on the bladder is not completely understood and you may want to see if eliminating one or all of these items improves your bladder control  If you are unable to give them up completely, it is recommended that you use the following items in moderation:   Acidic beverages and foods (orange juice, grapefruit juice, lemonade etc)   Alcoholic beverages   Vinegar   Coffee (regular and decaf)   Tea (regular and decaf)   Caffeinated beverages  Kegel Exercises for Men   AMBULATORY CARE:   Kegel exercises  help strengthen your pelvic muscles  Pelvic muscles hold your pelvic organs, such as your bladder, in place  Kegel exercises help prevent or control problems with urine incontinence (leakage)  Incontinence may be caused by an enlarged prostate, older age, or obesity  Contact your healthcare provider if:   You cannot feel your pelvic muscles tighten or relax  You continue to leak urine  You have questions or concerns about your condition or care      Use the correct muscles:  Pelvic muscles are the muscles you use to control urine flow  To target these muscles, stop and start the flow of urine several times  This will help you become familiar with how it feels to tighten and relax these muscles  How to do Kegel exercises:   Empty your bladder  You may lie down, stand up, or sit down to do these exercises  When you first try to do these exercises, it may be easier if you lie down  Tighten or squeeze your pelvic muscles slowly  It may feel like you are trying to hold back urine or gas  Hold this position for 3 seconds  Relax for 3 seconds  Repeat this cycle 10 times  Do 10 sets of Kegel exercises, at least 3 times a day  Do not hold your breath when you do Kegel exercises  Keep your stomach, back, and leg muscles relaxed  As your muscles get stronger, you will be able to hold the squeeze longer  Your healthcare provider may ask that you increase your pelvic muscle squeeze to 10 seconds  After you squeeze for 10 seconds, relax for 10 seconds  What else you should know:   Once you know how to do Kegel exercises, use different positions  This will help to strengthen your pelvic muscles as much as possible  You can do these exercises while you lie on the floor, watch TV, or stand  You may notice improved bladder control within about 6 weeks  Tighten your pelvic muscles before you sneeze, cough, or lift to prevent urine leakage  Follow up with your healthcare provider as directed:  Write down your questions so you remember to ask them during your visits  © Copyright [address] Information is for End User's use only and may not be sold, redistributed or otherwise used for commercial purposes  All illustrations and images included in CareNotes® are the copyrighted property of [employer]  or [facility] Marcelo Mendieta   The above information is an  only  It is not intended as medical advice for individual conditions or treatments   Talk to your doctor, nurse or pharmacist before following any medical regimen to see if it is safe and effective for you   Carbonated beverages          2  Drinking enough and the right kinds of fluids  Many people with bladder control issues decrease their intake of liquids in hope that they will need to urinate less frequently or have less urinary leakage   You should not restrict fluids to control your bladder  While a decrease in liquid intake does result in a decrease in the volume of urine, the smaller amount of urine may be more highly concentrated   Highly concentrated, dark yellow urine is irritating to the bladder surface and may actually cause you to go to the bathroom more frequently   It also encourages the growth of bacteria, which may lead to infections resulting in incontinence   Substitutions for Bladder Irritants: water is always the best beverage choice    Grape and apple juice are thirst quenchers are good selections and are not as irritating to the bladder   o Low acid fruits:  Pears, apricots, papaya, watermelon  o For coffee drinkers: KAVA®, Postum®, Andreas®, Kaffree Anna®  o For tea drinkers:  non-citrus or herbal and sun brewed tea

## 2021-04-26 DIAGNOSIS — F51.05 INSOMNIA SECONDARY TO ANXIETY: ICD-10-CM

## 2021-04-26 DIAGNOSIS — F41.9 INSOMNIA SECONDARY TO ANXIETY: ICD-10-CM

## 2021-04-27 RX ORDER — ALPRAZOLAM 0.25 MG/1
0.25 TABLET ORAL
Qty: 30 TABLET | Refills: 0 | Status: SHIPPED | OUTPATIENT
Start: 2021-04-27 | End: 2021-05-24 | Stop reason: SDUPTHER

## 2021-04-27 NOTE — TELEPHONE ENCOUNTER
Cont[place] Sub[place]    [place]  reviewed: No red flags were identified; safe to proceed with prescription  Sawyer Po     PDMP Review       Value Time User    PDMP Reviewed  Yes 4/27/2021 10:18 AM Nancy Louis PA-C

## 2021-05-13 ENCOUNTER — TELEPHONE (OUTPATIENT)
Dept: GASTROENTEROLOGY | Facility: CLINIC | Age: 76
End: 2021-05-13

## 2021-05-14 ENCOUNTER — TELEPHONE (OUTPATIENT)
Dept: GASTROENTEROLOGY | Facility: CLINIC | Age: 76
End: 2021-05-14

## 2021-05-14 ENCOUNTER — PREP FOR PROCEDURE (OUTPATIENT)
Dept: GASTROENTEROLOGY | Facility: CLINIC | Age: 76
End: 2021-05-14

## 2021-05-14 DIAGNOSIS — K50.911 CROHN'S DISEASE WITH RECTAL BLEEDING, UNSPECIFIED GASTROINTESTINAL TRACT LOCATION (HCC): Primary | ICD-10-CM

## 2021-05-14 NOTE — TELEPHONE ENCOUNTER
Patient scheduled for 1 repeat Colonoscopy 7/20/21 with Dr Xu Villanueva  Procedure packet with miralax/dulcolax directions sent via mail  Patient will contact office once received to go over all information

## 2021-05-18 ENCOUNTER — OFFICE VISIT (OUTPATIENT)
Dept: FAMILY MEDICINE CLINIC | Facility: CLINIC | Age: 76
End: 2021-05-18
Payer: MEDICARE

## 2021-05-18 VITALS
SYSTOLIC BLOOD PRESSURE: 138 MMHG | WEIGHT: 135.8 LBS | OXYGEN SATURATION: 96 % | DIASTOLIC BLOOD PRESSURE: 86 MMHG | BODY MASS INDEX: 24.99 KG/M2 | HEART RATE: 68 BPM | TEMPERATURE: 96.8 F | HEIGHT: 62 IN

## 2021-05-18 DIAGNOSIS — I10 ESSENTIAL HYPERTENSION: ICD-10-CM

## 2021-05-18 DIAGNOSIS — E78.5 DYSLIPIDEMIA: ICD-10-CM

## 2021-05-18 DIAGNOSIS — F32.1 CURRENT MODERATE EPISODE OF MAJOR DEPRESSIVE DISORDER WITHOUT PRIOR EPISODE (HCC): Primary | ICD-10-CM

## 2021-05-18 PROCEDURE — 99213 OFFICE O/P EST LOW 20 MIN: CPT | Performed by: PHYSICIAN ASSISTANT

## 2021-05-18 NOTE — PROGRESS NOTES
Assessment/Plan:    Problem List Items Addressed This Visit        Cardiovascular and Mediastinum    Essential hypertension       Other    Dyslipidemia    Current moderate episode of major depressive disorder without prior episode ([facility] [address] ) - Primary           Diagnoses and all orders for this visit:    Current moderate episode of major depressive disorder without prior episode ([facility] [address] )    Dyslipidemia    Essential hypertension        No problem-specific Assessment & Plan notes found for this encounter  Subjective:      Patient ID: Jagjit Cross is a 68 y o  male  Olenajose Kiddy returns today for follow up  He does not have any new complaints today  The following portions of the patient's history were reviewed and updated as appropriate:   He has a past medical history of Acute gouty arthropathy, Arthritis, Bacterial pneumonia, Depression, Fracture of rib, closed, Gout, Hyperlipidemia, Hypertension, and Melanoma ([facility] [address] )  ,  does not have any pertinent problems on file  ,   has a past surgical history that includes Eye surgery; Malignant skin lesion excision; pr shldr arthroscop,lyse adhesns (Right, 8/28/2017); and Mohs surgery  ,  family history includes Aneurysm in his father; Aortic aneurysm in his family; Cancer in his mother; No Known Problems in his sister  ,   reports that he has never smoked  He has never used smokeless tobacco  He reports previous alcohol use  He reports that he does not use drugs  ,  has No Known Allergies     Current Outpatient Medications   Medication Sig Dispense Refill    ALPRAZolam (XANAX) 0 25 mg tablet Take 1 tablet (0 25 mg total) by mouth daily at bedtime as needed for anxiety or sleep 30 tablet 0    cyanocobalamin (VITAMIN B-12) 1,000 mcg tablet Take 1,000 mcg by mouth daily        escitalopram (LEXAPRO) 20 mg tablet Take 1 tablet (20 mg total) by mouth daily 30 tablet 2    ibuprofen (MOTRIN) 200 mg tablet Take 200 mg by mouth every 6 (six) hours as needed for mild pain      losartan (COZAAR) 50 mg tablet Take 1 tablet (50 mg total) by mouth daily 90 tablet 0    Mirabegron ER 50 MG TB24 Take 1 tablet (50 mg total) by mouth daily 30 tablet 12    Misc Natural Products (OSTEO BI-FLEX ADV TRIPLE ST PO) Take by mouth      Multiple Vitamin (MULTIVITAMIN) tablet Take 1 tablet by mouth daily      sulfaSALAzine (AZULFIDINE-EN) 500 mg EC tablet Take 2 tablets (1,000 mg total) by mouth 2 (two) times a day 120 tablet 3    valACYclovir (VALTREX) 1,000 mg tablet TAKE 2 TABLETS BY MOUTH AT ONSET THEN 2 TABLETS AFTER 12 HOURS      VITAMIN D, CHOLECALCIFEROL, PO Take 2,000 Units by mouth daily   mesalamine (ASACOL) 800 MG EC tablet Take 2 tablets (1,600 mg total) by mouth 3 (three) times a day (Patient not taking: Reported on 8/12/2020) 180 tablet 2    mesalamine (LIALDA) 1 2 g EC tablet Take 4 tablets (4 8 g total) by mouth daily with breakfast (Patient not taking: Reported on 8/12/2020) 120 tablet 3     No current facility-administered medications for this visit  Review of Systems   Constitutional: Negative for activity change, appetite change, chills, diaphoresis, fatigue, fever and unexpected weight change  HENT: Negative for congestion, ear pain, postnasal drip, rhinorrhea, sinus pressure, sinus pain, sneezing, sore throat, tinnitus and voice change  Eyes: Negative for pain, redness and visual disturbance  Respiratory: Negative for cough, chest tightness, shortness of breath and wheezing  Cardiovascular: Negative for chest pain, palpitations and leg swelling  Gastrointestinal: Negative for abdominal pain, blood in stool, constipation, diarrhea, nausea and vomiting  Genitourinary: Negative for difficulty urinating, dysuria, frequency, hematuria and urgency  Musculoskeletal: Negative for arthralgias, back pain, gait problem, joint swelling, myalgias, neck pain and neck stiffness  Skin: Negative for color change, pallor, rash and wound     Neurological: Negative for dizziness, tremors, weakness, light-headedness and headaches  Psychiatric/Behavioral: Negative for dysphoric mood, self-injury, sleep disturbance and suicidal ideas  The patient is not nervous/anxious  Objective:  Vitals:    05/18/21 0732 05/18/21 0745   BP: 158/82 138/86   Pulse: 68    Temp: (!) 96 8 °F (36 °C)    SpO2: 96%    Weight: 61 6 kg (135 lb 12 8 oz)    Height: 5' 2" (1 575 m)      Body mass index is 24 84 kg/m²  Physical Exam  Vitals signs reviewed  Constitutional:       General: He is not in acute distress  Appearance: He is well-developed  He is not diaphoretic  HENT:      Head: Normocephalic and atraumatic  Right Ear: Hearing, tympanic membrane, ear canal and external ear normal       Left Ear: Hearing, tympanic membrane, ear canal and external ear normal       Mouth/Throat:      Pharynx: Uvula midline  No oropharyngeal exudate  Eyes:      General: No scleral icterus  Right eye: No discharge  Left eye: No discharge  Conjunctiva/sclera: Conjunctivae normal    Neck:      Musculoskeletal: Neck supple  Thyroid: No thyromegaly  Vascular: No carotid bruit  Cardiovascular:      Rate and Rhythm: Normal rate and regular rhythm  Heart sounds: Normal heart sounds  No murmur  Pulmonary:      Effort: Pulmonary effort is normal  No respiratory distress  Breath sounds: Normal breath sounds  No wheezing  Abdominal:      General: Bowel sounds are normal  There is no distension  Palpations: Abdomen is soft  There is no mass  Tenderness: There is no abdominal tenderness  There is no guarding or rebound  Musculoskeletal: Normal range of motion  General: No tenderness  Lymphadenopathy:      Cervical: No cervical adenopathy  Skin:     General: Skin is warm and dry  Findings: No erythema or rash  Neurological:      Mental Status: He is alert and oriented to person, place, and time     Psychiatric:         Behavior: Behavior normal          Thought Content:  Thought content normal          Judgment: Judgment normal

## 2021-05-24 DIAGNOSIS — F41.1 GAD (GENERALIZED ANXIETY DISORDER): ICD-10-CM

## 2021-05-24 DIAGNOSIS — F41.9 INSOMNIA SECONDARY TO ANXIETY: ICD-10-CM

## 2021-05-24 DIAGNOSIS — F51.05 INSOMNIA SECONDARY TO ANXIETY: ICD-10-CM

## 2021-05-24 RX ORDER — ESCITALOPRAM OXALATE 20 MG/1
20 TABLET ORAL DAILY
Qty: 30 TABLET | Refills: 2 | Status: SHIPPED | OUTPATIENT
Start: 2021-05-24 | End: 2021-08-18 | Stop reason: SDUPTHER

## 2021-05-24 RX ORDER — ALPRAZOLAM 0.25 MG/1
0.25 TABLET ORAL
Qty: 30 TABLET | Refills: 0 | Status: SHIPPED | OUTPATIENT
Start: 2021-05-24 | End: 2021-06-21 | Stop reason: SDUPTHER

## 2021-05-24 NOTE — TELEPHONE ENCOUNTER
Cont[place]    [place]: No red flags were identified; safe to proceed with prescription  Timo Stanton     PDMP Review       Value Time User    PDMP Reviewed  Yes 5/24/2021  2:02 PM Joseph Doss PA-C

## 2021-06-11 ENCOUNTER — OFFICE VISIT (OUTPATIENT)
Dept: OBGYN CLINIC | Facility: CLINIC | Age: 76
End: 2021-06-11
Payer: MEDICARE

## 2021-06-11 VITALS
BODY MASS INDEX: 24.84 KG/M2 | HEART RATE: 74 BPM | DIASTOLIC BLOOD PRESSURE: 76 MMHG | WEIGHT: 135 LBS | HEIGHT: 62 IN | SYSTOLIC BLOOD PRESSURE: 145 MMHG

## 2021-06-11 DIAGNOSIS — M17.11 PRIMARY LOCALIZED OSTEOARTHRITIS OF RIGHT KNEE: Primary | ICD-10-CM

## 2021-06-11 PROCEDURE — 99212 OFFICE O/P EST SF 10 MIN: CPT | Performed by: ORTHOPAEDIC SURGERY

## 2021-06-11 NOTE — PATIENT INSTRUCTIONS
Patient would like to go ahead and try Visco supplements  We will therefore order these and see the patient back when they are available  He did not want to do formal physical therapy at this point time  He may continue his glucosamine chondroitin but we discussed there is mixed research on the effectiveness  He may use ice for days of moderate to severe pain along with Tylenol or ibuprofen

## 2021-06-11 NOTE — PROGRESS NOTES
68 y o male presents for follow-up evaluation of right knee pain  He notes the cortisone injection did not provide much benefit  Would like to try Visco supplements  His pain is fairly well controlled  He has trouble lot with stiffness and limited motion  Review of Systems  Review of systems negative unless otherwise specified in HPI    Past Medical History  Past Medical History:   Diagnosis Date    Acute gouty arthropathy     Last Assessed: 6/9/2015    Arthritis     Bacterial pneumonia     Last Assessed: 6/21/2016    Depression     Fracture of rib, closed     Last Assessed: 6/21/2016    Gout     Hyperlipidemia     Hypertension     Melanoma ([address] )        Past Surgical History  Past Surgical History:   Procedure Laterality Date    EYE SURGERY      MALIGNANT SKIN LESION EXCISION      MOHS SURGERY      shaving of lesion moh's technique     LA SHLDR ARTHROSCOP,LYSE ADHESNS Right 8/28/2017    Procedure: ARTHROSCOPY [facility] UNDER GENERAL ANESTHESIA ,LYSIS OF ADHESIONS, CAPSULECTOMY;  Surgeon: Chapito Eller MD;  Location: [facility];  Service: Orthopedics       Current Medications  Current Outpatient Medications on File Prior to Visit   Medication Sig Dispense Refill    ALPRAZolam (XANAX) 0 25 mg tablet Take 1 tablet (0 25 mg total) by mouth daily at bedtime as needed for anxiety or sleep 30 tablet 0    cyanocobalamin (VITAMIN B-12) 1,000 mcg tablet Take 1,000 mcg by mouth daily        escitalopram (LEXAPRO) 20 mg tablet Take 1 tablet (20 mg total) by mouth daily 30 tablet 2    ibuprofen (MOTRIN) 200 mg tablet Take 200 mg by mouth every 6 (six) hours as needed for mild pain      losartan (COZAAR) 50 mg tablet Take 1 tablet (50 mg total) by mouth daily 90 tablet 0    Mirabegron ER 50 MG TB24 Take 1 tablet (50 mg total) by mouth daily 30 tablet 12    Misc Natural Products (OSTEO BI-FLEX ADV TRIPLE ST PO) Take by mouth      Multiple Vitamin (MULTIVITAMIN) tablet Take 1 tablet by mouth daily      valACYclovir (VALTREX) 1,000 mg tablet TAKE 2 TABLETS BY MOUTH AT ONSET THEN 2 TABLETS AFTER 12 HOURS      VITAMIN D, CHOLECALCIFEROL, PO Take 2,000 Units by mouth daily   mesalamine (ASACOL) 800 MG EC tablet Take 2 tablets (1,600 mg total) by mouth 3 (three) times a day (Patient not taking: Reported on 8/12/2020) 180 tablet 2    mesalamine (LIALDA) 1 2 g EC tablet Take 4 tablets (4 8 g total) by mouth daily with breakfast (Patient not taking: Reported on 8/12/2020) 120 tablet 3    sulfaSALAzine (AZULFIDINE-EN) 500 mg EC tablet Take 2 tablets (1,000 mg total) by mouth 2 (two) times a day 120 tablet 3     No current facility-administered medications on file prior to visit  Recent Labs [facility])  0   Lab Value Date/Time    HCT 38 5 08/05/2020 0856    HCT 45 4 10/31/2014 0906    HGB 11 9 (L) 08/05/2020 0856    HGB 14 4 10/31/2014 0906    WBC 8 05 08/05/2020 0856    WBC 5 93 10/31/2014 0906    INR 1 00 08/23/2017 0824    CRP <3 0 08/05/2020 0856    GLUCOSE 84 10/31/2014 0906         Physical exam  Body mass index is 24 69 kg/m²  · General: Awake, Alert, Oriented  · Eyes: Pupils equal, round and reactive to light  · Heart: regular rate and rhythm  · Lungs: No audible wheezing  · Abdomen: soft  right Knee exam  · No gross effusion, erythema, warmth  Positive medial joint line tenderness  Range of motion 10 to 100 positive crepitation  Procedure  Today    Imaging  None today but previous x-rays reviewed noting medial joint space bone-on-bone  1  Primary localized osteoarthritis of right knee      Assessment:  right knee OA    Plan:  Patient would like to go ahead and try Visco supplements  We will therefore order these and see the patient back when they are available  He did not want to do formal physical therapy at this point time  He may continue his glucosamine chondroitin but we discussed there is mixed research on the effectiveness    He may use ice for days of moderate to severe pain along with Tylenol or ibuprofen  This note was created with voice recognition software

## 2021-06-15 ENCOUNTER — OFFICE VISIT (OUTPATIENT)
Dept: OBGYN CLINIC | Facility: CLINIC | Age: 76
End: 2021-06-15
Payer: MEDICARE

## 2021-06-15 VITALS
DIASTOLIC BLOOD PRESSURE: 77 MMHG | HEIGHT: 62 IN | BODY MASS INDEX: 24.84 KG/M2 | HEART RATE: 76 BPM | SYSTOLIC BLOOD PRESSURE: 131 MMHG | WEIGHT: 135 LBS

## 2021-06-15 DIAGNOSIS — M67.912 TENDINOPATHY OF LEFT ROTATOR CUFF: ICD-10-CM

## 2021-06-15 DIAGNOSIS — M17.11 PRIMARY LOCALIZED OSTEOARTHRITIS OF RIGHT KNEE: Primary | ICD-10-CM

## 2021-06-15 PROCEDURE — 20610 DRAIN/INJ JOINT/BURSA W/O US: CPT | Performed by: ORTHOPAEDIC SURGERY

## 2021-06-15 PROCEDURE — 99213 OFFICE O/P EST LOW 20 MIN: CPT | Performed by: ORTHOPAEDIC SURGERY

## 2021-06-15 NOTE — PROGRESS NOTES
Assessment/Plan:    Mis Silver is a 68year old male with osteoarthritis of right knee presents today for Synvisc One injection  Patient tolerated the procedure well without immediate complication  For left shoulder pain likely secondary to rotator cuff tendinopathy will obtain MRI left shoulder w/o contrast for further evaluation  Referred to physical therapy  Case d/w Dr Rosalee Ashby  Diagnoses and all orders for this visit:    Primary localized osteoarthritis of right knee    Tendinopathy of left rotator cuff  -     MRI shoulder left wo contrast; Future  -     Ambulatory referral to Physical Therapy; Future    Other orders  -     Large joint arthrocentesis        Subjective:      Patient ID: Florence Nascimento is a 68 y o  male  HPI     Mis Silver is a 68year old male with osteoarthritis of right knee presents today for Synvisc One injection  Patient reports left shoulder pain progressively worsening since 2010  Left shoulder pain worse when he tries to reach up for object  Patient reports limited range of motion of left shoulder  He has not tried physical therapy before  Denied any numbness or tingling of left upper extremity  The following portions of the patient's history were reviewed and updated as appropriate: allergies, current medications, past family history, past medical history, past social history, past surgical history and problem list     Review of Systems   Constitutional: Negative for chills and fever  HENT: Negative for ear pain and sore throat  Eyes: Negative for pain and visual disturbance  Respiratory: Negative for cough and shortness of breath  Cardiovascular: Negative for chest pain and palpitations  Gastrointestinal: Negative for abdominal pain and vomiting  Genitourinary: Negative for dysuria and hematuria  Musculoskeletal: Positive for arthralgias (left shoulder pain, right knee pain)  Negative for back pain     Skin: Negative for color change and rash  Neurological: Negative for seizures and syncope  All other systems reviewed and are negative  Objective:      /77   Pulse 76   Ht 5' 2" (1 575 m)   Wt 61 2 kg (135 lb)   BMI 24 69 kg/m²          Physical Exam  Vitals and nursing note reviewed  Constitutional:       General: He is not in acute distress  Appearance: He is well-developed  HENT:      Head: Normocephalic  Mouth/Throat:      Pharynx: No oropharyngeal exudate  Eyes:      General: No scleral icterus  Right eye: No discharge  Left eye: No discharge  Conjunctiva/sclera: Conjunctivae normal    Cardiovascular:      Rate and Rhythm: Normal rate and regular rhythm  Heart sounds: Normal heart sounds  No murmur heard  Pulmonary:      Effort: Pulmonary effort is normal  No respiratory distress  Breath sounds: Normal breath sounds  No wheezing  Abdominal:      General: Bowel sounds are normal  There is no distension  Palpations: Abdomen is soft  Tenderness: There is no abdominal tenderness  Musculoskeletal:         General: No tenderness  Cervical back: Normal range of motion  Right lower leg: No edema  Left lower leg: No edema  Comments: (+) crepitus in right knee  Tenderness on knee flexion at 120 degree  Left shoulder exam: (+) hawkin's test, (+) Neer's sign, (+) lift off test   limited abduction tenderness at 50 degree abduction  Lymphadenopathy:      Cervical: No cervical adenopathy  Skin:     Findings: No erythema  Neurological:      Mental Status: He is alert and oriented to person, place, and time  Psychiatric:         Behavior: Behavior normal          Large joint arthrocentesis: R knee  Universal Protocol:  Consent: Verbal consent obtained    Consent given by: patient  Time out: Immediately prior to procedure a "time out" was called to verify the correct patient, procedure, equipment, support staff and site/side marked as required  Timeout called at: 6/15/2021 3:30 PM   Patient understanding: patient states understanding of the procedure being performed  Patient consent: the patient's understanding of the procedure matches consent given  Procedure consent: procedure consent matches procedure scheduled  Relevant documents: relevant documents present and verified  Site marked: the operative site was marked  Patient identity confirmed: verbally with patient    Supporting Documentation  Indications: pain   Procedure Details  Location: knee - R knee  Needle gauge: 21G    Ultrasound guidance: no  Approach: anterolateral  Medications administered: 48 mg hylan 48 MG/6ML    Patient tolerance: patient tolerated the procedure well with no immediate complications

## 2021-06-21 DIAGNOSIS — F41.9 INSOMNIA SECONDARY TO ANXIETY: ICD-10-CM

## 2021-06-21 DIAGNOSIS — F51.05 INSOMNIA SECONDARY TO ANXIETY: ICD-10-CM

## 2021-06-21 RX ORDER — ALPRAZOLAM 0.25 MG/1
0.25 TABLET ORAL
Qty: 30 TABLET | Refills: 0 | Status: SHIPPED | OUTPATIENT
Start: 2021-06-21 | End: 2021-07-22 | Stop reason: SDUPTHER

## 2021-06-21 NOTE — TELEPHONE ENCOUNTER
Cont[place] Sub[place]    [place]  reviewed: No red flags were identified; safe to proceed with prescription  Salma Alva     PDMP Review       Value Time User    PDMP Reviewed  Yes 6/21/2021  1:14 PM Taj Romero PA-C

## 2021-06-23 ENCOUNTER — HOSPITAL ENCOUNTER (OUTPATIENT)
Dept: MRI IMAGING | Facility: HOSPITAL | Age: 76
Discharge: HOME | End: 2021-06-23
Payer: MEDICARE

## 2021-06-23 DIAGNOSIS — M67.912 TENDINOPATHY OF LEFT ROTATOR CUFF: ICD-10-CM

## 2021-06-23 PROCEDURE — G1004 CDSM NDSC: HCPCS

## 2021-06-23 PROCEDURE — 73221 MRI JOINT UPR EXTREM W/O DYE: CPT

## 2021-06-25 ENCOUNTER — OFFICE VISIT (OUTPATIENT)
Dept: OBGYN CLINIC | Facility: CLINIC | Age: 76
End: 2021-06-25
Payer: MEDICARE

## 2021-06-25 VITALS
SYSTOLIC BLOOD PRESSURE: 126 MMHG | DIASTOLIC BLOOD PRESSURE: 69 MMHG | HEART RATE: 84 BPM | BODY MASS INDEX: 24.84 KG/M2 | HEIGHT: 62 IN | WEIGHT: 135 LBS

## 2021-06-25 DIAGNOSIS — M19.012 ARTHRITIS OF LEFT GLENOHUMERAL JOINT: Primary | ICD-10-CM

## 2021-06-25 PROCEDURE — 20610 DRAIN/INJ JOINT/BURSA W/O US: CPT | Performed by: ORTHOPAEDIC SURGERY

## 2021-06-25 PROCEDURE — 99213 OFFICE O/P EST LOW 20 MIN: CPT | Performed by: ORTHOPAEDIC SURGERY

## 2021-06-25 RX ORDER — TRIAMCINOLONE ACETONIDE 40 MG/ML
80 INJECTION, SUSPENSION INTRA-ARTICULAR; INTRAMUSCULAR
Status: COMPLETED | OUTPATIENT
Start: 2021-06-25 | End: 2021-06-25

## 2021-06-25 RX ORDER — BUPIVACAINE HYDROCHLORIDE 5 MG/ML
6 INJECTION, SOLUTION EPIDURAL; INTRACAUDAL
Status: COMPLETED | OUTPATIENT
Start: 2021-06-25 | End: 2021-06-25

## 2021-06-25 RX ADMIN — BUPIVACAINE HYDROCHLORIDE 6 ML: 5 INJECTION, SOLUTION EPIDURAL; INTRACAUDAL at 14:43

## 2021-06-25 RX ADMIN — TRIAMCINOLONE ACETONIDE 80 MG: 40 INJECTION, SUSPENSION INTRA-ARTICULAR; INTRAMUSCULAR at 14:43

## 2021-06-25 NOTE — LETTER
June 24, 2021     Patient: Denisse Gu   YOB: 1945   Date of Visit: 6/25/2021       Can MRI shoulder from 6/23/21 be read by tomorrow 8 am         Sincerely,        Chapito Eller MD    CC: No Recipients

## 2021-06-25 NOTE — PROGRESS NOTES
Chief Complaint  Left shoulder pain and stiffness    History Of Presenting Illness  Irwin Lane 1945 presents with  Left shoulder pain and stiffness for over 10 years  Gradually getting worse  Patient had no treatment for this  Patient presents for evaluation of x-rays an MRI  Patient has some good days and some bad days  Overall patient is having increasing trouble with activities daily living  Current Medications  Current Outpatient Medications   Medication Sig Dispense Refill    ALPRAZolam (XANAX) 0 25 mg tablet Take 1 tablet (0 25 mg total) by mouth daily at bedtime as needed for anxiety or sleep 30 tablet 0    cyanocobalamin (VITAMIN B-12) 1,000 mcg tablet Take 1,000 mcg by mouth daily   escitalopram (LEXAPRO) 20 mg tablet Take 1 tablet (20 mg total) by mouth daily 30 tablet 2    ibuprofen (MOTRIN) 200 mg tablet Take 200 mg by mouth every 6 (six) hours as needed for mild pain      losartan (COZAAR) 50 mg tablet Take 1 tablet (50 mg total) by mouth daily 90 tablet 0    mesalamine (ASACOL) 800 MG EC tablet Take 2 tablets (1,600 mg total) by mouth 3 (three) times a day (Patient not taking: Reported on 8/12/2020) 180 tablet 2    mesalamine (LIALDA) 1 2 g EC tablet Take 4 tablets (4 8 g total) by mouth daily with breakfast (Patient not taking: Reported on 8/12/2020) 120 tablet 3    Mirabegron ER 50 MG TB24 Take 1 tablet (50 mg total) by mouth daily 30 tablet 12    Misc Natural Products (OSTEO BI-FLEX ADV TRIPLE ST PO) Take by mouth      Multiple Vitamin (MULTIVITAMIN) tablet Take 1 tablet by mouth daily      sulfaSALAzine (AZULFIDINE-EN) 500 mg EC tablet Take 2 tablets (1,000 mg total) by mouth 2 (two) times a day 120 tablet 3    valACYclovir (VALTREX) 1,000 mg tablet TAKE 2 TABLETS BY MOUTH AT ONSET THEN 2 TABLETS AFTER 12 HOURS      VITAMIN D, CHOLECALCIFEROL, PO Take 2,000 Units by mouth daily  No current facility-administered medications for this visit  Current Problems    Active Problems:   Patient Active Problem List    Diagnosis Date Noted    Current moderate episode of major depressive disorder without prior episode ([facility] ) 02/17/2021    Diarrhea 06/26/2020    Weight loss, unintentional 06/26/2020    Crohn's disease with rectal bleeding ([facility] ) 06/12/2020    Primary osteoarthritis of first carpometacarpal joint of right hand 11/08/2017    Arthritis 10/07/2017    Rotator cuff tear arthropathy of right shoulder 08/28/2017    Primary osteoarthritis of both shoulders 06/16/2017    Dyslipidemia 10/28/2014    Depression 09/27/2012    Malignant melanoma of skin ([facility] ) 09/27/2012    Erectile dysfunction of non-organic origin 05/21/2012    Essential hypertension 05/18/2012         Review of Systems:    General: negative for - chills, fatigue, fever,  weight gain or weight loss  Psychological: negative for - anxiety, behavioral disorder, concentration difficulties  Ophthalmic: negative for - blurry vision, decreased vision, double vision,      Past Medical History:   Past Medical History:   Diagnosis Date    Acute gouty arthropathy     Last Assessed: 6/9/2015    Arthritis     Bacterial pneumonia     Last Assessed: 6/21/2016    Depression     Fracture of rib, closed     Last Assessed: 6/21/2016    Gout     Hyperlipidemia     Hypertension     Melanoma ([facility] )        Past Surgical History:   Past Surgical History:   Procedure Laterality Date    EYE SURGERY      MALIGNANT SKIN LESION EXCISION      MOHS SURGERY      shaving of lesion moh's technique     TN SHLDR ARTHROSCOP,LYSE ADHESNS Right 8/28/2017    Procedure: ARTHROSCOPY [facility] UNDER GENERAL ANESTHESIA ,LYSIS OF ADHESIONS, CAPSULECTOMY;  Surgeon: Seble Burgos MD;  Location: [facility];  Service: Orthopedics       Family History:  Family history reviewed and non-contributory  Family History   Problem Relation Age of Onset    Cancer Mother     Aneurysm Father     No Known Problems Sister     Aortic aneurysm Family        Social History:  Social History     Socioeconomic History    Marital status:      Spouse name: None    Number of children: None    Years of education: None    Highest education level: None   Occupational History    Occupation: Retired    Tobacco Use    Smoking status: Never Smoker    Smokeless tobacco: Never Used   Vaping Use    Vaping Use: Never used   Substance and Sexual Activity    Alcohol use: Not Currently     Comment: rare, being a social drinker     Drug use: Never    Sexual activity: Yes   Other Topics Concern    None   Social History Narrative    None     Social Determinants of Health     Financial Resource Strain:     Difficulty of Paying Living Expenses:    Food Insecurity:     Worried About Running Out of Food in the Last Year:     [address] in the Last Year:    Transportation Needs:     Lack of Transportation (Medical):  Lack of Transportation (Non-Medical):    Physical Activity:     Days of Exercise per Week:     Minutes of Exercise per Session:    Stress:     Feeling of Stress :    Social Connections:     Frequency of Communication with Friends and Family:     Frequency of Social Gatherings with Friends and Family:     Attends [employer] Services:     Active Member of Clubs or Organizations:     Attends Club or Organization Meetings:     Marital Status:    Intimate Partner Violence:     Fear of Current or Ex-Partner:     Emotionally Abused:     Physically Abused:     Sexually Abused:         Allergies:   No Known Allergies        Physical ExaminationBP 126/69   Pulse 84   Ht 5' 2" (1 575 m)   Wt 61 2 kg (135 lb)   BMI 24 69 kg/m²   Gen: Alert and oriented to person, place, time  HEENT: EOMI, eyes clear, moist mucus membranes, hearing intact      Orthopedic Exam    Left shoulder no swelling or deformity forward flexion is around 60 AB duction is 50, 0 degrees external rotation   radiographs and MRI confirms severe DJD of the left glenohumeral joint          Impression    left shoulder osteoarthritis        Procedure: MRI shoulder left wo contrast    Result Date: 6/24/2021  Narrative: MRI LEFT SHOULDER INDICATION:   H80 940: Unspecified disorder of synovium and tendon, left shoulder  Chronic pain and limited range of motion COMPARISON:  X-ray from 4/9/2019 TECHNIQUE:   The following MR sequences were obtained of the left shoulder: Localizer, axial GRE/PD fat sat, oblique coronal T2 fat sat, oblique sagittal T1/T2 fat sat  Gadolinium was not used  FINDINGS: SUBCUTANEOUS TISSUES: Normal JOINT EFFUSION: None  ACROMION PROCESS: Downsloping ROTATOR CUFF: There is tendinopathy of the supraspinatus tendon with slight increased signal no evidence of tear of the rotator cuff  SUBACROMIAL/SUBDELTOID BURSA: Normal  LONG HEAD OF BICEPS TENDON: Normal  GLENOID LABRUM: There is deformity of the cartilaginous labrum related to severe osteoarthritis  Fraying of the superior labrum is noted without obvious linear defect  GLENOHUMERAL JOINT: There is severe degenerative changes glenohumeral joint with loss of cartilage interval as well as areas of subarticular edema noted as well is areas of lower signal consistent with sclerosis noted on the x-ray  Heavy osteophyte formation of the humeral head and glenoid is seen  Bulky ossific density is noted posterior to the humeral head on image image 7 series 2 adjacent to bony posterior labrum  This most likely also represents a heavy osteophyte although there appears to be some higher T2 signal  this structure from the remainder of the labrum  A large loose joint body is also possible  Some ossification also appears to be present superior and posterior to the humeral head interposed between the musculotendinous junction of the supraspinatus and infraspinatus and humeral head  This area may be new as compared to the x-ray of 2019   Ossific bodies near the axillary recess anteroinferiorly are also noted  There is deformity of the humeral head and glenoid  There is mild superior subluxation of the humeral head  ACROMIOCLAVICULAR JOINT:  Normal  BONES: Normal      Impression: Severe osteoarthritis of the glenohumeral joint with deformity and heavy osteophyte formation  Ossific bodies appear posterior as well as superior to the humeral head some of which may be new since 2019  X-ray correlation is suggested  There is mild subluxation of the humeral head however the rotator cuff which demonstrates changes of tendinopathy but no definite tear  Workstation performed: [id number]       Plan     discussed treatment with the patient   left glenohumeral joint injected with steroid and local anesthetic   patient will start a physical therapy program, icing, over-the-counter pain medication as needed and home exercise program   follow-up in 3 months, patient is probably heading towards a total shoulder or reverse shoulder arthroplasty but he would like to consider arthroscopic debridement this may may not work  Large joint arthrocentesis: L glenohumeral  Universal Protocol:  Risks and benefits: risks, benefits and alternatives were discussed  Consent given by: patient  Time out: Immediately prior to procedure a "time out" was called to verify the correct patient, procedure, equipment, support staff and site/side marked as required    Timeout called at: 6/25/2021 2:43 PM   Site marked: the operative site was marked  Patient identity confirmed: verbally with patient    Supporting Documentation  Indications: pain, diagnostic evaluation and joint swelling   Procedure Details  Location: shoulder - L glenohumeral  Preparation: Patient was prepped and draped in the usual sterile fashion  Needle size: 22 G  Ultrasound guidance: no  Approach: posterolateral  Medications administered: 80 mg triamcinolone acetonide 40 mg/mL; 6 mL bupivacaine (PF) 0 5 %    Patient tolerance: patient tolerated the procedure well with no immediate complications  Dressing:  Sterile dressing applied          Princess Maria Isabel MD        Portions of the record may have been created with voice recognition software  Occasional wrong word or "sound a like" substitutions may have occurred due to the inherent limitations of voice recognition software  Read the chart carefully and recognize, using context, where substitutions have occurred

## 2021-07-06 ENCOUNTER — TELEPHONE (OUTPATIENT)
Dept: GASTROENTEROLOGY | Facility: CLINIC | Age: 76
End: 2021-07-06

## 2021-07-06 NOTE — TELEPHONE ENCOUNTER
Patients GI provider:  Dr Nicole Lopez    Number to return call: [phone]    Reason for call: Pt calling to cx procedure and does not wish to r/s at this time    Scheduled procedure/appointment date if applicable: Procedure - 07/20/21

## 2021-07-12 DIAGNOSIS — I10 ESSENTIAL HYPERTENSION: ICD-10-CM

## 2021-07-12 RX ORDER — LOSARTAN POTASSIUM 50 MG/1
50 TABLET ORAL DAILY
Qty: 90 TABLET | Refills: 0 | Status: SHIPPED | OUTPATIENT
Start: 2021-07-12 | End: 2021-10-21 | Stop reason: SDUPTHER

## 2021-07-19 ENCOUNTER — EVALUATION (OUTPATIENT)
Dept: PHYSICAL THERAPY | Facility: CLINIC | Age: 76
End: 2021-07-19
Payer: MEDICARE

## 2021-07-19 DIAGNOSIS — M19.012 ARTHRITIS OF LEFT GLENOHUMERAL JOINT: Primary | ICD-10-CM

## 2021-07-19 PROCEDURE — 97535 SELF CARE MNGMENT TRAINING: CPT | Performed by: PHYSICAL THERAPIST

## 2021-07-19 PROCEDURE — 97140 MANUAL THERAPY 1/> REGIONS: CPT | Performed by: PHYSICAL THERAPIST

## 2021-07-19 PROCEDURE — 97161 PT EVAL LOW COMPLEX 20 MIN: CPT | Performed by: PHYSICAL THERAPIST

## 2021-07-19 PROCEDURE — 97110 THERAPEUTIC EXERCISES: CPT | Performed by: PHYSICAL THERAPIST

## 2021-07-19 NOTE — PROGRESS NOTES
PT Evaluation     Today's date: 2021  Patient name: Levi Groves  : 1945  MRN: [id number]  Referring provider: Tanvir Tejeda MD  Dx:   Encounter Diagnosis     ICD-10-CM    1  Arthritis of left glenohumeral joint  M19 012 Ambulatory referral to Physical Therapy                  Assessment  Understanding of Dx/Px/POC: good   Prognosis: good    Goals  STGs: To be complete within 4 weeks  - Decrease pain to < 2/10 at worst  - Increase AROM to WNL  - Increase strength to > 4+/5  - Improve postural awareness capacity to > 60min before deficit    LTGs: To be complete within 6 weeks  - Able to repetitively complete all overhead activity without pain or limitation for increased safety and functional capacity with ADLs and home-related duty  - Able to repetitively complete all reaching activity without pain or limitation for increased safety and functional capacity with ADLs and home-related duty  - Able to repetitively complete all pushing/pulling activity without pain or limitation for increased safety and functional capacity with ADLs and home-related duty  - Able to complete all lifting/carrying activity without pain or limitation for increased safety and functional capacity with ADLs and home-related duty    Plan  Planned therapy interventions: manual therapy, neuromuscular re-education, patient education, self care, therapeutic activities and therapeutic exercise  Frequency: 2x week  Duration in weeks: 6       Pt is a 76y o  male with chronic L shoulder pain and ROM Deficits who presents with functional deficits including decreased capacity with overhead activity, pushing/pulling, lifting/carrying, and behind the back/cross body reaching activity  Upon completion of today's initial evaluation, Arvind's sx remain consistent with L Shoulder Adhesive Capsulitis, Subacromial impingement, and RTC tendonopathy   Patient will benefit from skilled physical therapy to address current deficits  Subjective Evaluation    Pain  Current pain ratin  At best pain ratin  At worst pain ratin  Location: L Shoulder    Patient Goals  Patient goals for therapy: increased strength, decreased pain and increased motion         Pt reports chronic L Shoulder ROM deficits, pain, and weakness for > 10 years  Reports continued worsening and negatively effecting his overall safety and functional capacity with ADLs and home-related duty  Objective Pain level ranges 2-8/10  AROM: L Shoulder Flexion 90 degrees, Abd 80 degrees, ER 25 degrees, IR 40 degrees; PROM +5 degrees in all planes);  R Shoulder WNL  Strength: L Shoulder Flex, Abd, ER 3+/5; IR 4/5  Postural Awareness: Poor (rounded shoulders, forward head)  Unable to complete overhead activity without pain and limitation  Unable to complete pushing/pulling activity without pain and limitation  Unable to complete lifting/carrying activity without pain and limitation  Unable to complete cross body/behind the back reaching activity without pain and limitation             Precautions: None at this time    Daily Treatment Diary    HEP: Handout provided and discussed      Manuals 21            ART x15'            PROM/Stretch             IASTM             STM/Triggerpoint             JM                          Neuro Re-Ed             Scap Retract 2x10            No $ AROM 2x10            Supine No $ AROM  Next session                                                               Ther Ex             TB No $  Next session           Supine Wand Flexion 2x10            Supine Wand ER 2x10            Table slides scaption  Next session           Table seated ER PROM with self-assist via trunk flexion  Next session                                                                                                      Ther Activity             Retro UBE  Next session                        Gait Training                                       Modalities MHP             CP             US/Stim

## 2021-07-22 DIAGNOSIS — F51.05 INSOMNIA SECONDARY TO ANXIETY: ICD-10-CM

## 2021-07-22 DIAGNOSIS — F41.9 INSOMNIA SECONDARY TO ANXIETY: ICD-10-CM

## 2021-07-22 RX ORDER — ALPRAZOLAM 0.25 MG/1
0.25 TABLET ORAL
Qty: 30 TABLET | Refills: 0 | Status: SHIPPED | OUTPATIENT
Start: 2021-07-22 | End: 2021-08-18 | Stop reason: SDUPTHER

## 2021-07-22 NOTE — TELEPHONE ENCOUNTER
Controlled Substance Review    [place]MP or [place]  reviewed: No red flags were identified; safe to proceed with prescription  Richardson Spruce     PDMP Review       Value Time User    PDMP Reviewed  Yes 7/22/2021 10:13 AM Lui Vasquez PA-C

## 2021-07-23 ENCOUNTER — OFFICE VISIT (OUTPATIENT)
Dept: PHYSICAL THERAPY | Facility: CLINIC | Age: 76
End: 2021-07-23
Payer: MEDICARE

## 2021-07-23 DIAGNOSIS — M19.012 ARTHRITIS OF LEFT GLENOHUMERAL JOINT: Primary | ICD-10-CM

## 2021-07-23 PROCEDURE — 97110 THERAPEUTIC EXERCISES: CPT

## 2021-07-23 PROCEDURE — 97140 MANUAL THERAPY 1/> REGIONS: CPT

## 2021-07-23 NOTE — PROGRESS NOTES
Daily Note     Today's date: 2021  Patient name: Lia Russ  : 1945  MRN: [id number]  Referring provider: Drea Alicea MD  Dx:   Encounter Diagnosis     ICD-10-CM    1  Arthritis of left glenohumeral joint  M19 012                   Subjective: Pt reports exercises are going well  Reports some improvement in sx  Objective: See treatment diary below      Assessment: Tolerated treatment well  Patient demonstrated fatigue post treatment  Pt completes program with notable restriction  Pt alok program with mild pain complaints  Plan: Continue per plan of care        Precautions: None at this time    Daily Treatment Diary    HEP: Handout provided and discussed      Manuals 21           ART x15'            PROM/Stretch  15'           IASTM             STM/Triggerpoint             JM                          Neuro Re-Ed             Scap Retract 2x10 2/10           No $ AROM 2x10 2/10           Supine No $ AROM  2/10                                                               Ther Ex             TB No $  Next session           Supine Wand Flexion 2x10 2/10           Supine Wand ER 2x10 2/10           Table slides scaption  2/10           Table seated ER PROM with self-assist via trunk flexion  2/10                                                                                                      Ther Activity             Retro UBE  5'                        Gait Training                                       Modalities             MHP  10'           CP             US/Stim

## 2021-07-27 ENCOUNTER — OFFICE VISIT (OUTPATIENT)
Dept: PHYSICAL THERAPY | Facility: CLINIC | Age: 76
End: 2021-07-27
Payer: MEDICARE

## 2021-07-27 DIAGNOSIS — M19.012 ARTHRITIS OF LEFT GLENOHUMERAL JOINT: Primary | ICD-10-CM

## 2021-07-27 PROCEDURE — 97110 THERAPEUTIC EXERCISES: CPT

## 2021-07-27 PROCEDURE — 97140 MANUAL THERAPY 1/> REGIONS: CPT

## 2021-07-27 PROCEDURE — 97112 NEUROMUSCULAR REEDUCATION: CPT

## 2021-07-27 NOTE — PROGRESS NOTES
Daily Note     Today's date: 2021  Patient name: Leah Lynne  : 1945  MRN: [id number]  Referring provider: Brent Youssef MD  Dx:   Encounter Diagnosis     ICD-10-CM    1  Arthritis of left glenohumeral joint  M19 012                   Subjective: Reports feeling some improvement in sx and feels a little looser  Objective: See treatment diary below      Assessment: Tolerated treatment well  Patient demonstrated fatigue post treatment  Pt alok added TB rowing well today  Pt with good alok to ROM program however has notable restriction with ROM  Plan: Continue per plan of care        Precautions: None at this time    Daily Treatment Diary    HEP: Handout provided and discussed      Manuals 21          ART x15'            PROM/Stretch  15' 15'          IASTM             STM/Triggerpoint             JM                          Neuro Re-Ed             Scap Retract 2x10 2/10 2/10          No $ AROM 2x10 2/10 2/10          Supine No $ AROM  2/10 2/10                                                              Ther Ex             TB No $  Next session L1 2/10          Supine Wand Flexion 2x10 2/10 2/10          Supine Wand ER 2x10 2/10 2/10          Table slides scaption  2/10 2/10          Table seated ER PROM with self-assist via trunk flexion  2/10 2/10          TB rowing   L2 2/10                                                                                        Ther Activity             Retro UBE  5' 5'                       Gait Training                                       Modalities             MHP  10' 10'          CP             US/Stim

## 2021-07-30 ENCOUNTER — APPOINTMENT (OUTPATIENT)
Dept: PHYSICAL THERAPY | Facility: CLINIC | Age: 76
End: 2021-07-30
Payer: MEDICARE

## 2021-08-03 ENCOUNTER — OFFICE VISIT (OUTPATIENT)
Dept: PHYSICAL THERAPY | Facility: CLINIC | Age: 76
End: 2021-08-03
Payer: MEDICARE

## 2021-08-03 DIAGNOSIS — M19.012 ARTHRITIS OF LEFT GLENOHUMERAL JOINT: Primary | ICD-10-CM

## 2021-08-03 PROCEDURE — 97110 THERAPEUTIC EXERCISES: CPT | Performed by: PHYSICAL THERAPIST

## 2021-08-03 PROCEDURE — 97112 NEUROMUSCULAR REEDUCATION: CPT | Performed by: PHYSICAL THERAPIST

## 2021-08-03 PROCEDURE — 97140 MANUAL THERAPY 1/> REGIONS: CPT | Performed by: PHYSICAL THERAPIST

## 2021-08-03 NOTE — PROGRESS NOTES
Daily Note     Today's date: 8/3/2021  Patient name: Thi Torrez  : 1945  MRN: [id number]  Referring provider: Francia Hoyos MD  Dx:   Encounter Diagnosis     ICD-10-CM    1  Arthritis of left glenohumeral joint  M19 012                   Subjective: The patient reports that he is doing okay today  He has been doing his HEP at home twice a day without difficulty  Objective: See treatment diary below      Assessment: Good tolerance to all TE today  Verbal cues needed for correct form with completing TE  He is limited with all planes for PROM and strength  Audible crepitus is heard t/o shoulder with certain motions  No increased pain noted at end of session  Continued PT would be beneficial to improve function  Plan: Continue per plan of care  Progress as able in upcoming visits         Precautions: None at this time    Daily Treatment Diary    HEP: Handout provided and discussed      Manuals 21 8/3         ART x15'            PROM/Stretch  15' 15' 15'         IASTM             STM/Triggerpoint             JM                          Neuro Re-Ed             Scap Retract 2x10 2/10 2/10 2x10         No $ AROM 2x10 2/10 2/10 2x10         Supine No $ AROM  2/10 2/10 2x10                                                             Ther Ex             TB No $  Next session L1 2/10 L1 2x10         Supine Wand Flexion 2x10 2/10 2/10 2x10         Supine Wand ER 2x10 2/10 2/10 2x10         Table slides scaption  2/10 2/10 2x10         Table seated ER PROM with self-assist via trunk flexion  2/10 2/10 2x10         TB rowing   L2 2/10 L2 2x10                                                                                       Ther Activity             Retro UBE  5' 5' 5'                      Gait Training                                       Modalities             MHP  10' 10' 10' pre tx         CP             US/Stim

## 2021-08-06 ENCOUNTER — OFFICE VISIT (OUTPATIENT)
Dept: PHYSICAL THERAPY | Facility: CLINIC | Age: 76
End: 2021-08-06
Payer: MEDICARE

## 2021-08-06 DIAGNOSIS — M19.012 ARTHRITIS OF LEFT GLENOHUMERAL JOINT: Primary | ICD-10-CM

## 2021-08-06 PROCEDURE — 97110 THERAPEUTIC EXERCISES: CPT

## 2021-08-06 PROCEDURE — 97140 MANUAL THERAPY 1/> REGIONS: CPT

## 2021-08-06 PROCEDURE — 97112 NEUROMUSCULAR REEDUCATION: CPT

## 2021-08-06 NOTE — PROGRESS NOTES
Daily Note     Today's date: 2021  Patient name: Philippe Martinez  : 1945  MRN: [id number]  Referring provider: Chastity Hardy MD  Dx:   Encounter Diagnosis     ICD-10-CM    1  Arthritis of left glenohumeral joint  M19 012                   Subjective: Pt states he will transfer to [facility] office as it is closer to home and less traffic  Reports he is doing well and feeling more motion  Objective: See treatment diary below      Assessment: Tolerated treatment well  Patient exhibited good technique with therapeutic exercises  Pt cont to have ROM limitations in all planes with notable crepitus at times  Pt alok program well without c/o pain today  Pt will benefit from cont ROM and strength program to improve overall function  Plan: Continue per plan of care        Precautions: None at this time    Daily Treatment Diary    HEP: Handout provided and discussed      Manuals 7/19/21 7/23 7/27 8/3 8        ART x15'            PROM/Stretch  15' 15' 15' 15'        IASTM             STM/Triggerpoint             JM                          Neuro Re-Ed             Scap Retract 2x10 2/10 2/10 2x10 3/10        No $ AROM 2x10 2/10 2/10 2x10 3/10        Supine No $ AROM  2/10 2/10 2x10 2/10                                                            Ther Ex             TB No $  Next session L1 2/10 L1 2x10 L1 2/10        Supine Wand Flexion 2x10 2/10 2/10 2x10 2/10        Supine Wand ER 2x10 2/10 2/10 2x10 2/10        Table slides scaption  2/10 2/10 2x10 2/10        Table seated ER PROM with self-assist via trunk flexion  2/10 2/10 2x10 2/10        TB rowing   L2 2/10 L2 2x10 L3 3/10                                                                                      Ther Activity             Retro UBE  5' 5' 5' 5'                     Gait Training                                       Modalities             MHP  10' 10' 10' pre tx         CP     10'        US/Stim

## 2021-08-10 ENCOUNTER — APPOINTMENT (OUTPATIENT)
Dept: PHYSICAL THERAPY | Facility: CLINIC | Age: 76
End: 2021-08-10
Payer: MEDICARE

## 2021-08-13 ENCOUNTER — EVALUATION (OUTPATIENT)
Dept: PHYSICAL THERAPY | Facility: CLINIC | Age: 76
End: 2021-08-13
Payer: MEDICARE

## 2021-08-13 ENCOUNTER — APPOINTMENT (OUTPATIENT)
Dept: PHYSICAL THERAPY | Facility: CLINIC | Age: 76
End: 2021-08-13
Payer: MEDICARE

## 2021-08-13 DIAGNOSIS — M19.012 ARTHRITIS OF LEFT GLENOHUMERAL JOINT: Primary | ICD-10-CM

## 2021-08-13 PROCEDURE — 97140 MANUAL THERAPY 1/> REGIONS: CPT | Performed by: PHYSICAL THERAPIST

## 2021-08-13 PROCEDURE — 97110 THERAPEUTIC EXERCISES: CPT | Performed by: PHYSICAL THERAPIST

## 2021-08-13 NOTE — PROGRESS NOTES
PT Re-Evaluation      Today's date: 2021  Patient name: Garry Cruz  : 1945  MRN: [id number]  Referring provider: Kimberly Guevara MD  Dx:   Encounter Diagnosis     ICD-10-CM    1  Arthritis of left glenohumeral joint  M19 012 Ambulatory referral to Physical Therapy                  Assessment  Understanding of Dx/Px/POC: good   Prognosis: good    Goals  STGs: To be complete within 4 weeks  - Decrease pain to < 2/10 at worst  - Increase AROM to WNL  - Increase strength to > 4+/5  - Improve postural awareness capacity to > 60min before deficit    LTGs: To be complete within 6 weeks  - Able to repetitively complete all overhead activity without pain or limitation for increased safety and functional capacity with ADLs and home-related duty  - Able to repetitively complete all reaching activity without pain or limitation for increased safety and functional capacity with ADLs and home-related duty  - Able to repetitively complete all pushing/pulling activity without pain or limitation for increased safety and functional capacity with ADLs and home-related duty  - Able to complete all lifting/carrying activity without pain or limitation for increased safety and functional capacity with ADLs and home-related duty    Plan  Planned therapy interventions: manual therapy, neuromuscular re-education, patient education, self care, therapeutic activities and therapeutic exercise  Frequency: 2x week  Duration in weeks: 6       Pt is a 76y o  male with chronic L shoulder pain and ROM Deficits who presents with functional deficits including decreased capacity with overhead activity, pushing/pulling, lifting/carrying, and behind the back/cross body reaching activity  Upon completion of today's initial evaluation, Arvind's sx remain consistent with L Shoulder Adhesive Capsulitis, Subacromial impingement, and RTC tendonopathy   Patient will benefit from skilled physical therapy to address current deficits  Subjective Evaluation    Pain  Current pain ratin  At best pain ratin  At worst pain ratin  Location: L Shoulder    Patient Goals  Patient goals for therapy: increased strength, decreased pain and increased motion         Pt reports chronic L Shoulder ROM deficits, pain, and weakness for > 10 years  Reports continued worsening and negatively effecting his overall safety and functional capacity with ADLs and home-related duty          Objective Pain level ranges 2-8/10  AROM: L Shoulder   Flexion 73 degrees,   Abd 74 degrees,  ER: C7   IR: T12    R Shoulder WNL  Strength: L Shoulder   Flex,2-  Abd, 2-  ER 2+  IR 2      Postural Awareness: Poor (rounded shoulders, forward head)  Unable to complete overhead activity without pain and limitation  Unable to complete pushing/pulling activity without pain and limitation  Unable to complete lifting/carrying activity without pain and limitation  Unable to complete cross body/behind the back reaching activity without pain and limitation             Precautions: None at this time    Daily Treatment Diary    HEP: Handout provided and discussed    Manuals  7/23 7/27 8/3 8/6  8/13   MFR to UT, Levator, Scalenes, Pec M/M +          20'    jt PROM           jt P->A mobs +                                        Neuro Re-Ed          Scap Retract +         No $  +         Rows L1 +         Rows L2           Rows L3            Shoulder Ext with TB           Serratus Punches         Pec Stretch 30 Sec hold +        Pec Minor Stretch 30"hd                              Ther Ex           UBE   ALT      10 min  120 Retro   Pulley's flexion/scaption +        Wand Flexion +         Wand ER +         Table slides scaption +         IR strap Stretch          UT stretch          Levator Stretch          Scalene Stretch +                                                    Ther Activity                               Gait Training                              Modalities

## 2021-08-17 ENCOUNTER — OFFICE VISIT (OUTPATIENT)
Dept: PHYSICAL THERAPY | Facility: CLINIC | Age: 76
End: 2021-08-17
Payer: MEDICARE

## 2021-08-17 ENCOUNTER — APPOINTMENT (OUTPATIENT)
Dept: PHYSICAL THERAPY | Facility: CLINIC | Age: 76
End: 2021-08-17
Payer: MEDICARE

## 2021-08-17 DIAGNOSIS — M19.012 ARTHRITIS OF LEFT GLENOHUMERAL JOINT: Primary | ICD-10-CM

## 2021-08-17 PROCEDURE — 97112 NEUROMUSCULAR REEDUCATION: CPT | Performed by: PHYSICAL THERAPIST

## 2021-08-17 PROCEDURE — 97140 MANUAL THERAPY 1/> REGIONS: CPT | Performed by: PHYSICAL THERAPIST

## 2021-08-17 PROCEDURE — 97110 THERAPEUTIC EXERCISES: CPT | Performed by: PHYSICAL THERAPIST

## 2021-08-17 NOTE — PROGRESS NOTES
Daily Note     Today's date: 2021  Patient name: Leah Lynne  : 1945  MRN: [id number]  Referring provider: Brent Youssef MD  Dx:   Encounter Diagnosis     ICD-10-CM    1  Arthritis of left glenohumeral joint  M19 012                   Subjective: Pt reports his shoulder is less "wacky"      Objective: See treatment diary below      Assessment: Tolerated treatment well  Patient would benefit from continued PT      Plan: Progress treatment as tolerated         Precautions: None at this time    Daily Treatment Diary      HEP: Handout provided and discussed     Manuals  8/17 7/23 7/27 8/3 8/6  8/13   MFR to UT, Levator, Scalenes, Pec M/M 10'          20'   L shoulder PROM  5'             GH jt P->A mobs +                                                             Neuro Re-Ed               Scap Retract +             No $  30x Red             Rows L1 30x Red             Rows L2               Rows L3                Shoulder Ext with TB               Serratus Punches               Pec Stretch 30 Sec hold +             Pec Minor Stretch 30"hd                                               Ther Ex               UBE   10 min   120 ALT          10 min  120 Retro   Pulley's flexion/scaption 3 min each             Wand Flexion 10" hold 10x             Wand ER 10" hold 10x             Table slides scaption +             IR strap Stretch               UT stretch               Levator Stretch               Scalene Stretch +                                                                             Ther Activity                                               Gait Training                                               Modalities

## 2021-08-18 DIAGNOSIS — F41.1 GAD (GENERALIZED ANXIETY DISORDER): ICD-10-CM

## 2021-08-18 DIAGNOSIS — F41.9 INSOMNIA SECONDARY TO ANXIETY: ICD-10-CM

## 2021-08-18 DIAGNOSIS — F51.05 INSOMNIA SECONDARY TO ANXIETY: ICD-10-CM

## 2021-08-18 RX ORDER — ESCITALOPRAM OXALATE 20 MG/1
20 TABLET ORAL DAILY
Qty: 30 TABLET | Refills: 2 | Status: SHIPPED | OUTPATIENT
Start: 2021-08-18 | End: 2021-11-22 | Stop reason: SDUPTHER

## 2021-08-18 RX ORDER — ALPRAZOLAM 0.25 MG/1
0.25 TABLET ORAL
Qty: 30 TABLET | Refills: 0 | Status: SHIPPED | OUTPATIENT
Start: 2021-08-18 | End: 2021-09-22 | Stop reason: SDUPTHER

## 2021-08-19 ENCOUNTER — OFFICE VISIT (OUTPATIENT)
Dept: FAMILY MEDICINE CLINIC | Facility: CLINIC | Age: 76
End: 2021-08-19
Payer: MEDICARE

## 2021-08-19 VITALS
OXYGEN SATURATION: 97 % | HEIGHT: 62 IN | TEMPERATURE: 97.6 F | SYSTOLIC BLOOD PRESSURE: 132 MMHG | BODY MASS INDEX: 24.29 KG/M2 | HEART RATE: 74 BPM | WEIGHT: 132 LBS | DIASTOLIC BLOOD PRESSURE: 82 MMHG

## 2021-08-19 DIAGNOSIS — F32.1 CURRENT MODERATE EPISODE OF MAJOR DEPRESSIVE DISORDER WITHOUT PRIOR EPISODE (HCC): Primary | ICD-10-CM

## 2021-08-19 DIAGNOSIS — F41.9 ANXIETY: ICD-10-CM

## 2021-08-19 PROCEDURE — 1123F ACP DISCUSS/DSCN MKR DOCD: CPT | Performed by: PHYSICIAN ASSISTANT

## 2021-08-19 PROCEDURE — 99213 OFFICE O/P EST LOW 20 MIN: CPT | Performed by: PHYSICIAN ASSISTANT

## 2021-08-19 NOTE — PROGRESS NOTES
Assessment/Plan:    Problem List Items Addressed This Visit        Other    Current moderate episode of major depressive disorder without prior episode ([facility] [address] ) - Primary      Other Visit Diagnoses     Anxiety               Diagnoses and all orders for this visit:    Current moderate episode of major depressive disorder without prior episode ([facility] [address] )    Anxiety        I challenged patient to cook 1 meal per week, and if he makes a larger serving, it will last a few meals  Subjective:      Patient ID: Selena Pierre is a 68 y o  male  Екатерина Schaumann returns today for 3 month follow up  He is agitated today, upset that his local [employer] is closing as well as watching Covid 19 pandemic seem to worsen again  He watches the news during the day and it is stressful for him  He cancelled his colonoscopy last month saying the prep would be too difficult for him to complete  He has not been making any meals for himself as he does not want to have to clean pots and pans  He gets lunch at [employer] daily and orders take out for dinner  The following portions of the patient's history were reviewed and updated as appropriate:   He has a past medical history of Acute gouty arthropathy, Arthritis, Bacterial pneumonia, Depression, Fracture of rib, closed, Gout, Hyperlipidemia, Hypertension, and Melanoma ([facility] [address] )  ,  does not have any pertinent problems on file  ,   has a past surgical history that includes Eye surgery; Malignant skin lesion excision; pr jazzmine arthroscop,sky benavides (Right, 8/28/2017); and Mohs surgery  ,  family history includes Aneurysm in his father; Aortic aneurysm in his family; Cancer in his mother; No Known Problems in his sister  ,   reports that he has never smoked  He has never used smokeless tobacco  He reports previous alcohol use  He reports that he does not use drugs  ,  has No Known Allergies     Current Outpatient Medications   Medication Sig Dispense Refill    ALPRAZolam Vernelle Inch) 0 25 mg tablet Take 1 tablet (0 25 mg total) by mouth daily at bedtime as needed for anxiety or sleep 30 tablet 0    cyanocobalamin (VITAMIN B-12) 1,000 mcg tablet Take 1,000 mcg by mouth daily   escitalopram (LEXAPRO) 20 mg tablet Take 1 tablet (20 mg total) by mouth daily 30 tablet 2    losartan (COZAAR) 50 mg tablet Take 1 tablet (50 mg total) by mouth daily 90 tablet 0    Multiple Vitamin (MULTIVITAMIN) tablet Take 1 tablet by mouth daily      valACYclovir (VALTREX) 1,000 mg tablet TAKE 2 TABLETS BY MOUTH AT ONSET THEN 2 TABLETS AFTER 12 HOURS      VITAMIN D, CHOLECALCIFEROL, PO Take 2,000 Units by mouth daily   ibuprofen (MOTRIN) 200 mg tablet Take 200 mg by mouth every 6 (six) hours as needed for mild pain (Patient not taking: Reported on 8/19/2021)      mesalamine (ASACOL) 800 MG EC tablet Take 2 tablets (1,600 mg total) by mouth 3 (three) times a day (Patient not taking: Reported on 8/12/2020) 180 tablet 2    Mirabegron ER 50 MG TB24 Take 1 tablet (50 mg total) by mouth daily 30 tablet 12    Misc Natural Products (OSTEO BI-FLEX ADV TRIPLE ST PO) Take by mouth      sulfaSALAzine (AZULFIDINE-EN) 500 mg EC tablet Take 2 tablets (1,000 mg total) by mouth 2 (two) times a day (Patient not taking: Reported on 8/19/2021) 120 tablet 3     No current facility-administered medications for this visit  Review of Systems   Constitutional: Negative for activity change, appetite change, chills, diaphoresis, fatigue, fever and unexpected weight change  HENT: Negative for congestion, ear pain, postnasal drip, rhinorrhea, sinus pressure, sinus pain, sneezing, sore throat, tinnitus and voice change  Eyes: Negative for pain, redness and visual disturbance  Respiratory: Negative for cough, chest tightness, shortness of breath and wheezing  Cardiovascular: Negative for chest pain, palpitations and leg swelling     Gastrointestinal: Negative for abdominal pain, blood in stool, constipation, diarrhea, nausea and vomiting  Genitourinary: Negative for difficulty urinating, dysuria, frequency, hematuria and urgency  Musculoskeletal: Negative for arthralgias, back pain, gait problem, joint swelling, myalgias, neck pain and neck stiffness  Skin: Negative for color change, pallor, rash and wound  Neurological: Negative for dizziness, tremors, weakness, light-headedness and headaches  Psychiatric/Behavioral: Positive for agitation and dysphoric mood  Negative for self-injury, sleep disturbance and suicidal ideas  The patient is nervous/anxious  Objective:  Vitals:    08/19/21 0728   BP: 132/82   Pulse: 74   Temp: 97 6 °F (36 4 °C)   SpO2: 97%   Weight: 59 9 kg (132 lb)   Height: 5' 2" (1 575 m)     Body mass index is 24 14 kg/m²  Physical Exam  Vitals reviewed  Constitutional:       General: He is not in acute distress  Appearance: He is well-developed  He is not diaphoretic  HENT:      Head: Normocephalic and atraumatic  Right Ear: Hearing, tympanic membrane, ear canal and external ear normal       Left Ear: Hearing, tympanic membrane, ear canal and external ear normal       Mouth/Throat:      Pharynx: Uvula midline  No oropharyngeal exudate  Eyes:      General: No scleral icterus  Right eye: No discharge  Left eye: No discharge  Conjunctiva/sclera: Conjunctivae normal    Neck:      Thyroid: No thyromegaly  Vascular: No carotid bruit  Cardiovascular:      Rate and Rhythm: Normal rate and regular rhythm  Heart sounds: Normal heart sounds  No murmur heard  Pulmonary:      Effort: Pulmonary effort is normal  No respiratory distress  Breath sounds: Normal breath sounds  No wheezing  Abdominal:      General: Bowel sounds are normal  There is no distension  Palpations: Abdomen is soft  There is no mass  Tenderness: There is no abdominal tenderness  There is no guarding or rebound     Musculoskeletal:         General: No tenderness  Normal range of motion  Cervical back: Neck supple  Lymphadenopathy:      Cervical: No cervical adenopathy  Skin:     General: Skin is warm and dry  Findings: No erythema or rash  Neurological:      Mental Status: He is alert and oriented to person, place, and time  Psychiatric:         Attention and Perception: Attention normal          Mood and Affect: Mood is anxious  Speech: Speech is rapid and pressured  Behavior: Behavior is agitated  Thought Content:  Thought content normal          Judgment: Judgment normal

## 2021-08-19 NOTE — PATIENT INSTRUCTIONS
Medicare Preventive Visit Patient Instructions  Thank you for completing your Welcome to Medicare Visit or Medicare Annual Wellness Visit today  Your next wellness visit will be due in one year (8/20/2022)  The screening/preventive services that you may require over the next 5-10 years are detailed below  Some tests may not apply to you based off risk factors and/or age  Screening tests ordered at today's visit but not completed yet may show as past due  Also, please note that scanned in results may not display below  Preventive Screenings:  Service Recommendations Previous Testing/Comments   Colorectal Cancer Screening  · Colonoscopy    · Fecal Occult Blood Test (FOBT)/Fecal Immunochemical Test (FIT)  · Fecal DNA/Cologuard Test  · Flexible Sigmoidoscopy Age: 54-65 years old   Colonoscopy: every 10 years (May be performed more frequently if at higher risk)  OR  FOBT/FIT: every 1 year  OR  Cologuard: every 3 years  OR  Sigmoidoscopy: every 5 years  Screening may be recommended earlier than age 48 if at higher risk for colorectal cancer  Also, an individualized decision between you and your healthcare provider will decide whether screening between the ages of 74-80 would be appropriate   Colonoscopy: 07/07/2020  FOBT/FIT: 06/26/2020  Cologuard: Not on file  Sigmoidoscopy: Not on file          Prostate Cancer Screening Individualized decision between patient and health care provider in men between ages of 53-78   Medicare will cover every 12 months beginning on the day after your 50th birthday PSA: 1 3 ng/mL     Screening Not Indicated     Hepatitis C Screening Once for adults born between 1945 and 1965  More frequently in patients at high risk for Hepatitis C Hep C Antibody: 03/17/2020    Screening Current   Diabetes Screening 1-2 times per year if you're at risk for diabetes or have pre-diabetes Fasting glucose: 100 mg/dL   A1C: No results in last 5 years        Cholesterol Screening Once every 5 years if you don't have a lipid disorder  May order more often based on risk factors  Lipid panel: 09/13/2019    Screening Current      Other Preventive Screenings Covered by Medicare:  1  Abdominal Aortic Aneurysm (AAA) Screening: covered once if your at risk  You're considered to be at risk if you have a family history of AAA or a male between the age of 73-68 who smoking at least 100 cigarettes in your lifetime  2  Lung Cancer Screening: covers low dose CT scan once per year if you meet all of the following conditions: (1) Age 50-69; (2) No signs or symptoms of lung cancer; (3) Current smoker or have quit smoking within the last 15 years; (4) You have a tobacco smoking history of at least 30 pack years (packs per day x number of years you smoked); (5) You get a written order from a healthcare provider  3  Glaucoma Screening: covered annually if you're considered high risk: (1) You have diabetes OR (2) Family history of glaucoma OR (3)  aged 48 and older OR (3)  American aged 72 and older  3  Osteoporosis Screening: covered every 2 years if you meet one of the following conditions: (1) Have a vertebral abnormality; (2) On glucocorticoid therapy for more than 3 months; (3) Have primary hyperparathyroidism; (4) On osteoporosis medications and need to assess response to drug therapy  5  HIV Screening: covered annually if you're between the age of 12-76  Also covered annually if you are younger than 13 and older than 72 with risk factors for HIV infection  For pregnant patients, it is covered up to 3 times per pregnancy      Immunizations:  Immunization Recommendations   Influenza Vaccine Annual influenza vaccination during flu season is recommended for all persons aged >= 6 months who do not have contraindications   Pneumococcal Vaccine (Prevnar and Pneumovax)  * Prevnar = PCV13  * Pneumovax = PPSV23 Adults 25-60 years old: 1-3 doses may be recommended based on certain risk factors  Adults 72 years old: Prevnar (PCV13) vaccine recommended followed by Pneumovax (PPSV23) vaccine  If already received PPSV23 since turning 65, then PCV13 recommended at least one year after PPSV23 dose  Hepatitis B Vaccine 3 dose series if at intermediate or high risk (ex: diabetes, end stage renal disease, liver disease)   Tetanus (Td) Vaccine - COST NOT COVERED BY MEDICARE PART B Following completion of primary series, a booster dose should be given every 10 years to maintain immunity against tetanus  Td may also be given as tetanus wound prophylaxis  Tdap Vaccine - COST NOT COVERED BY MEDICARE PART B Recommended at least once for all adults  For pregnant patients, recommended with each pregnancy  Shingles Vaccine (Shingrix) - COST NOT COVERED BY MEDICARE PART B  2 shot series recommended in those aged 48 and above     Health Maintenance Due:      Topic Date Due    Colorectal Cancer Screening  07/07/2021    Hepatitis C Screening  Completed     Immunizations Due:      Topic Date Due    Influenza Vaccine (1) 09/01/2021     Advance Directives   What are advance directives? Advance directives are legal documents that state your wishes and plans for medical care  These plans are made ahead of time in case you lose your ability to make decisions for yourself  Advance directives can apply to any medical decision, such as the treatments you want, and if you want to donate organs  What are the types of advance directives? There are many types of advance directives, and each state has rules about how to use them  You may choose a combination of any of the following:  · Living will: This is a written record of the treatment you want  You can also choose which treatments you do not want, which to limit, and which to stop at a certain time  This includes surgery, medicine, IV fluid, and tube feedings  · Durable power of  for healthcare [facility]):   This is a written record that states who you want to make healthcare choices for you when you are unable to make them for yourself  This person, called a proxy, is usually a family member or a friend  You may choose more than 1 proxy  · Do not resuscitate (DNR) order:  A DNR order is used in case your heart stops beating or you stop breathing  It is a request not to have certain forms of treatment, such as CPR  A DNR order may be included in other types of advance directives  · Medical directive: This covers the care that you want if you are in a coma, near death, or unable to make decisions for yourself  You can list the treatments you want for each condition  Treatment may include pain medicine, surgery, blood transfusions, dialysis, IV or tube feedings, and a ventilator (breathing machine)  · Values history: This document has questions about your views, beliefs, and how you feel and think about life  This information can help others choose the care that you would choose  Why are advance directives important? An advance directive helps you control your care  Although spoken wishes may be used, it is better to have your wishes written down  Spoken wishes can be misunderstood, or not followed  Treatments may be given even if you do not want them  An advance directive may make it easier for your family to make difficult choices about your care  © Copyright [employer] 2018 Information is for End User's use only and may not be sold, redistributed or otherwise used for commercial purposes   All illustrations and images included in CareNotes® are the copyrighted property of [employer]  or [address] [place] [address] [facility]

## 2021-08-20 ENCOUNTER — OFFICE VISIT (OUTPATIENT)
Dept: PHYSICAL THERAPY | Facility: CLINIC | Age: 76
End: 2021-08-20
Payer: MEDICARE

## 2021-08-20 ENCOUNTER — APPOINTMENT (OUTPATIENT)
Dept: PHYSICAL THERAPY | Facility: CLINIC | Age: 76
End: 2021-08-20
Payer: MEDICARE

## 2021-08-20 DIAGNOSIS — M19.012 ARTHRITIS OF LEFT GLENOHUMERAL JOINT: Primary | ICD-10-CM

## 2021-08-20 PROCEDURE — 97140 MANUAL THERAPY 1/> REGIONS: CPT

## 2021-08-20 PROCEDURE — 97110 THERAPEUTIC EXERCISES: CPT

## 2021-08-20 NOTE — PROGRESS NOTES
Daily Note     Today's date: 2021  Patient name: Mars Robb  : 1945  MRN: [id number]  Referring provider: Tristen Jasmine MD  Dx:   Encounter Diagnosis     ICD-10-CM    1  Arthritis of left glenohumeral joint  M19 012                   Subjective: Patient reports L shoulder has been feeling a bit better  Patient reports he notices his function has been improving since beginning PT  Objective: See treatment diary below      Assessment: Tolerated treatment well- no significant increase in L shoulder pain reported through TE additions  Patient required moderate verbal cues to perform TE with appropriate technique  Patient would benefit from continued PT to increase L shoulder strength and ROM for improved function in ADLs  Plan: Continue per plan of care        Precautions: None at this time    Daily Treatment Diary      HEP: Handout provided and discussed     Manuals  8/17 8/20 7/27 8/3 8/6  8/13   MFR to UT, Levator, Scalenes, Pec M/M 10' 10'        20'   L shoulder PROM  5' 5'           GH jt P->A mobs +                                                         Neuro Re-Ed              Scap Retract +            No $  30x Red 30x Red           Rows L1 30x Red 30x Red           Rows L2              Rows L3               Shoulder Ext with TB              Serratus Punches              Pec Stretch 30 Sec hold + 3x           Pec Minor Stretch 30"hd                                            Ther Ex              UBE   10 min   120 ALT 10 min   120 ALT        10 min  120 Retro   Pulley's flexion/scaption 3 min each 3 min each           Wand Flexion 10" hold 10x 10x           Wand ER 10" hold 10x 10x           Table slides scaption + 10"x10           IR strap Stretch              UT stretch              Levator Stretch              Scalene Stretch + 3x30"                                                                       Ther Activity                                            Gait Training                                           Modalities

## 2021-08-24 ENCOUNTER — APPOINTMENT (OUTPATIENT)
Dept: PHYSICAL THERAPY | Facility: CLINIC | Age: 76
End: 2021-08-24
Payer: MEDICARE

## 2021-08-24 ENCOUNTER — OFFICE VISIT (OUTPATIENT)
Dept: PHYSICAL THERAPY | Facility: CLINIC | Age: 76
End: 2021-08-24
Payer: MEDICARE

## 2021-08-24 DIAGNOSIS — M19.012 ARTHRITIS OF LEFT GLENOHUMERAL JOINT: Primary | ICD-10-CM

## 2021-08-24 PROCEDURE — 97140 MANUAL THERAPY 1/> REGIONS: CPT

## 2021-08-24 PROCEDURE — 97110 THERAPEUTIC EXERCISES: CPT

## 2021-08-24 PROCEDURE — 97112 NEUROMUSCULAR REEDUCATION: CPT

## 2021-08-24 NOTE — PROGRESS NOTES
Daily Note     Today's date: 2021  Patient name: Tanvi Talamantes  : 1945  MRN: [id number]  Referring provider: Catie Perry MD  Dx:   Encounter Diagnosis     ICD-10-CM    1  Arthritis of left glenohumeral joint  M19 012                   Subjective: Patient reports that his L shoulder continues to do better each time in  Notes 0/10 pain, but does note that his ROM is still limited  Objective: See treatment diary below      Assessment: Tolerated treatment well  Tightness was present in all shoulder directions today during PROM with a slight improvement in range made  Tenderness present along pec major and UT  Cuing was needed to avoid any UT compensation with rows  Patient would benefit from continued PT to increase L shoulder strength and ROM for improved function in ADLs  Plan: Continue per plan of care        Precautions: None at this time    Daily Treatment Diary      HEP: Handout provided and discussed     Manuals     MFR to UT, Levator, Scalenes, Pec M/M 10' 10' 10'     20'   L shoulder PROM  5' 5' 5'         jt P->A mobs +                                                 Neuro Re-Ed            Scap Retract +          No $  30x Red 30x Red 30x Red        Rows L1 30x Red 30x Red 30x Red        Rows L2            Rows L3             Shoulder Ext with TB            Serratus Punches            Pec Stretch 30 Sec hold + 3x 3x        Pec Minor Stretch 30"hd                                      Ther Ex            UBE   10 min   120 ALT 10 min   120 ALT 10 min ALT     10 min  120 Retro   Pulley's flexion/scaption 3 min each 3 min each 3 min each        Wand Flexion 10" hold 10x 10x 10x        Wand ER 10" hold 10x 10x 10x        Table slides scaption + 10"x10 10"x10        IR strap Stretch            UT stretch            Levator Stretch            Scalene Stretch + 3x30" 3x30"                                                            Ther Activity                                   Gait Training                                      Modalities

## 2021-08-27 ENCOUNTER — APPOINTMENT (OUTPATIENT)
Dept: PHYSICAL THERAPY | Facility: CLINIC | Age: 76
End: 2021-08-27
Payer: MEDICARE

## 2021-08-27 ENCOUNTER — OFFICE VISIT (OUTPATIENT)
Dept: PHYSICAL THERAPY | Facility: CLINIC | Age: 76
End: 2021-08-27
Payer: MEDICARE

## 2021-08-27 DIAGNOSIS — M19.012 ARTHRITIS OF LEFT GLENOHUMERAL JOINT: Primary | ICD-10-CM

## 2021-08-27 PROCEDURE — 97110 THERAPEUTIC EXERCISES: CPT

## 2021-08-27 PROCEDURE — 97140 MANUAL THERAPY 1/> REGIONS: CPT

## 2021-08-27 PROCEDURE — 97112 NEUROMUSCULAR REEDUCATION: CPT

## 2021-08-27 NOTE — PROGRESS NOTES
Daily Note     Today's date: 2021  Patient name: Ema Parry  : 1945  MRN: [id number]  Referring provider: Katy Sevilla MD  Dx:   Encounter Diagnosis     ICD-10-CM    1  Arthritis of left glenohumeral joint  M19 012                   Subjective: Patient reports some improvement  He is optimistic to regain whatever function he can out of his left shoulder "Its been this way a long time "      Objective: See treatment diary below      Assessment: Tolerated treatment fair  Patient would benefit from continued PT Scapular movement is moderately restricted  Moderate forward shoulder posturing is present  Progression of program to meet goals  Plan: Continue per plan of care        Precautions: None at this time    Daily Treatment Diary      HEP: Handout provided and discussed     Manuals       MFR to UT, Nicole, Phi, Pec M/M 10' 10' 10' 10'     L shoulder PROM  5' 5' 5' 5'     [facility] jt P->A mobs +                                         Neuro Re-Ed            Scap Retract +   20x       No $  30x Red 30x Red 30x Red 30x Red       Rows L1 30x Red 30x Red 30x Red 30x Red       Rows L2     20x Red       Rows L3             Shoulder Ext with TB     30x Red       Serratus Punches     Wand 15x       Pec Stretch 30 Sec hold + 3x 3x 3x       Pec Minor Stretch 30"hd                                      Ther Ex            UBE   10 min   120 ALT 10 min   120 ALT 10 min  RPM 10 min ALT      Sonali's flexion/scaption 3 min each 3 min each 3 min each 3 min ea      Wand Flexion 10" hold 10x 10x 10x 10x       Wand ER 10" hold 10x 10x 10x 10x       Table slides scaption 10" hd + 10"x10 10"x10 10x        IR strap Stretch            UT stretch            Levator Stretch            Scalene Stretch 30" hd + 3x30" 3x30" 3x                                                           Ther Activity                                      Gait Training                                    Modalities

## 2021-08-31 ENCOUNTER — APPOINTMENT (OUTPATIENT)
Dept: PHYSICAL THERAPY | Facility: CLINIC | Age: 76
End: 2021-08-31
Payer: MEDICARE

## 2021-08-31 ENCOUNTER — OFFICE VISIT (OUTPATIENT)
Dept: PHYSICAL THERAPY | Facility: CLINIC | Age: 76
End: 2021-08-31
Payer: MEDICARE

## 2021-08-31 DIAGNOSIS — M19.012 ARTHRITIS OF LEFT GLENOHUMERAL JOINT: Primary | ICD-10-CM

## 2021-08-31 PROCEDURE — 97110 THERAPEUTIC EXERCISES: CPT

## 2021-08-31 PROCEDURE — 97112 NEUROMUSCULAR REEDUCATION: CPT

## 2021-08-31 PROCEDURE — 97140 MANUAL THERAPY 1/> REGIONS: CPT

## 2021-08-31 NOTE — PROGRESS NOTES
Daily Note     Today's date: 2021  Patient name: Hosea Amaro  : 1945  MRN: [id number]  Referring provider: Amado Juan MD  Dx:   Encounter Diagnosis     ICD-10-CM    1  Arthritis of left glenohumeral joint  M19 012                   Subjective: patient stated he is not feeling too bad      Objective: See treatment diary below      Assessment: limited scapular retraction with exercise  Scapular winging on right  Patient able to complete all tasks assigned  Patient has tendency to raise entire scapular complex to compensated for shoulder flexion  Patient would benefit from continued therapy to decrease pain and increase strength and flexibility to improve overall LOF      Plan: Continue per plan of care  Progress treatment as tolerated         Precautions: None at this time    Daily Treatment Diary      HEP: Handout provided and discussed     Manuals      MFR to UT, Levator, Scalenes, Pec M/M 10' 10' 10' 10' 10'    L shoulder PROM  5' 5' 5' 5' 5'    [facility] jt P->A mobs +                                         Neuro Re-Ed            Scap Retract +   20x       No $  30x Red 30x Red 30x Red 30x Red  30x red      Rows L1 30x Red 30x Red 30x Red 30x Red  30x red      Rows L2     20x Red  20x red      Rows L3             Shoulder Ext with TB     30x Red  30x red      Serratus Punches     Wand 15x       Pec Stretch 30 Sec hold + 3x 3x 3x  3x     Pec Minor Stretch 30"hd                                      Ther Ex            UBE   10 min   120 ALT 10 min   120 ALT 10 min  RPM 10 min ALT  100 rpm  10 min alt     Sonali's flexion/scaption 3 min each 3 min each 3 min each 3 min ea  3  min    Wand Flexion 10" hold 10x 10x 10x 10x  10x     Wand ER 10" hold 10x 10x 10x 10x  10x     Table slides scaption 10" hd + 10"x10 10"x10 10x        IR strap Stretch            UT stretch            Levator Stretch            Scalene Stretch 30" hd + 3x30" 3x30" 3x  3x                                                      Ther Activity                                      Gait Training                                      Modalities

## 2021-09-03 ENCOUNTER — OFFICE VISIT (OUTPATIENT)
Dept: PHYSICAL THERAPY | Facility: CLINIC | Age: 76
End: 2021-09-03
Payer: MEDICARE

## 2021-09-03 DIAGNOSIS — M19.012 ARTHRITIS OF LEFT GLENOHUMERAL JOINT: Primary | ICD-10-CM

## 2021-09-03 PROCEDURE — 97110 THERAPEUTIC EXERCISES: CPT

## 2021-09-03 PROCEDURE — 97140 MANUAL THERAPY 1/> REGIONS: CPT

## 2021-09-03 PROCEDURE — 97112 NEUROMUSCULAR REEDUCATION: CPT

## 2021-09-03 NOTE — PROGRESS NOTES
Daily Note     Today's date: 9/3/2021  Patient name: Kira Gregg  : 1945  MRN: [id number]  Referring provider: Jessica Barrios MD  Dx:   Encounter Diagnosis     ICD-10-CM    1  Arthritis of left glenohumeral joint  M19 012                   Subjective: patient stated he feels about the same      Objective: See treatment diary below      Assessment: Patient continues to work towards goals set in  [address]  Patient presents with functional limitation due to shoulder pain and dyasfunction  No sx of pain but needed therapeutic rest breaks allow patient to recover  Will monitor strength and endurance and look to progress in upcoming visits  Plan: Continue per plan of care  Progress treatment as tolerated         Precautions: None at this time    Daily Treatment Diary      HEP: Handout provided and discussed     Manuals  8/17 8/20 8/24 8/27 8/31 9/3   MFR to UT, Levator, Phi, Pec M/M 10' 10' 10' 10' 10' 10'   L shoulder PROM  5' 5' 5' 5' 5' 5'   [facility] P->A mobs +                                         Neuro Re-Ed            Scap Retract +   20x       No $  30x Red 30x Red 30x Red 30x Red  30x red   30x red    Rows L1 30x Red 30x Red 30x Red 30x Red  30x red   30x red   Rows L2     20x Red  20x red   30x red    Rows L3             Shoulder Ext with TB     30x Red  30x red   30x red    Serratus Punches     Wand 15x    15x   Pec Stretch 30 Sec hold + 3x 3x 3x  3x  3x    Pec Minor Stretch 30"hd                                      Ther Ex            UBE   10 min   120 ALT 10 min   120 ALT 10 min  RPM 10 min ALT  100 rpm  10 min alt  100 rpm  10 min alt    Sonali's flexion/scaption 3 min each 3 min each 3 min each 3 min ea  3  min 3 min    Wand Flexion 10" hold 10x 10x 10x 10x  10x  10x   Wand ER 10" hold 10x 10x 10x 10x  10x  10x    Table slides scaption 10" hd + 10"x10 10"x10 10x     10x    IR strap Stretch            UT stretch            Levator Stretch            Scalene Stretch 30" hd + 3x30" 3x30" 3x  3x  3x                                                       Ther Activity                                      Gait Training                                      Modalities

## 2021-09-07 ENCOUNTER — APPOINTMENT (OUTPATIENT)
Dept: PHYSICAL THERAPY | Facility: CLINIC | Age: 76
End: 2021-09-07
Payer: MEDICARE

## 2021-09-09 ENCOUNTER — APPOINTMENT (OUTPATIENT)
Dept: PHYSICAL THERAPY | Facility: CLINIC | Age: 76
End: 2021-09-09
Payer: MEDICARE

## 2021-09-22 DIAGNOSIS — F51.05 INSOMNIA SECONDARY TO ANXIETY: ICD-10-CM

## 2021-09-22 DIAGNOSIS — F41.9 INSOMNIA SECONDARY TO ANXIETY: ICD-10-CM

## 2021-09-22 RX ORDER — ALPRAZOLAM 0.25 MG/1
0.25 TABLET ORAL
Qty: 30 TABLET | Refills: 0 | Status: SHIPPED | OUTPATIENT
Start: 2021-09-22 | End: 2021-10-21 | Stop reason: SDUPTHER

## 2021-09-22 NOTE — TELEPHONE ENCOUNTER
Controlled Substance Review    [place] PDMP or [place]  reviewed: No red flags were identified; safe to proceed with prescription  Ferol Samir     PDMP Review       Value Time User    PDMP Reviewed  Yes 9/22/2021 12:09 PM Katelyn Bee PA-C

## 2021-09-23 NOTE — ANESTHESIA PREPROCEDURE EVALUATION
Review of Systems/Medical History  Patient summary reviewed  Chart reviewed  No history of anesthetic complications     Cardiovascular  Hyperlipidemia, Hypertension ,    Pulmonary  Negative pulmonary ROS        GI/Hepatic      Comment: Crohn's disease      Negative  ROS        Endo/Other  Negative endo/other ROS      GYN       Hematology  Negative hematology ROS      Musculoskeletal  Gout,   Arthritis     Neurology  Negative neurology ROS      Psychology   Anxiety, Depression , being treated for depression,              Physical Exam    Airway    Mallampati score: II  TM Distance: >3 FB  Neck ROM: full     Dental       Cardiovascular      Pulmonary      Other Findings        Anesthesia Plan  ASA Score- 2     Anesthesia Type- IV sedation with anesthesia with ASA Monitors  Additional Monitors:   Airway Plan:         Plan Factors-    Induction- intravenous  Postoperative Plan-     Informed Consent- Anesthetic plan and risks discussed with patient  I personally reviewed this patient with the CRNA  Discussed and agreed on the Anesthesia Plan with the CRNA  Monica Sheridan
no

## 2021-10-10 ENCOUNTER — APPOINTMENT (EMERGENCY)
Dept: CT IMAGING | Facility: HOSPITAL | Age: 76
End: 2021-10-10
Payer: MEDICARE

## 2021-10-10 ENCOUNTER — HOSPITAL ENCOUNTER (EMERGENCY)
Facility: HOSPITAL | Age: 76
Discharge: HOME | End: 2021-10-10
Attending: EMERGENCY MEDICINE | Admitting: EMERGENCY MEDICINE
Payer: MEDICARE

## 2021-10-10 VITALS
BODY MASS INDEX: 24.3 KG/M2 | HEART RATE: 78 BPM | DIASTOLIC BLOOD PRESSURE: 82 MMHG | TEMPERATURE: 98 F | HEIGHT: 62 IN | WEIGHT: 132.06 LBS | OXYGEN SATURATION: 97 % | SYSTOLIC BLOOD PRESSURE: 138 MMHG | RESPIRATION RATE: 18 BRPM

## 2021-10-10 DIAGNOSIS — S00.03XA SCALP HEMATOMA, INITIAL ENCOUNTER: ICD-10-CM

## 2021-10-10 DIAGNOSIS — S01.01XA SCALP LACERATION: Primary | ICD-10-CM

## 2021-10-10 PROCEDURE — 72125 CT NECK SPINE W/O DYE: CPT

## 2021-10-10 PROCEDURE — 99282 EMERGENCY DEPT VISIT SF MDM: CPT | Performed by: PHYSICIAN ASSISTANT

## 2021-10-10 PROCEDURE — 99283 EMERGENCY DEPT VISIT LOW MDM: CPT

## 2021-10-10 PROCEDURE — G1004 CDSM NDSC: HCPCS

## 2021-10-10 PROCEDURE — 12001 RPR S/N/AX/GEN/TRNK 2.5CM/<: CPT | Performed by: PHYSICIAN ASSISTANT

## 2021-10-10 PROCEDURE — 70450 CT HEAD/BRAIN W/O DYE: CPT

## 2021-10-15 ENCOUNTER — OFFICE VISIT (OUTPATIENT)
Dept: UROLOGY | Facility: CLINIC | Age: 76
End: 2021-10-15
Payer: MEDICARE

## 2021-10-15 ENCOUNTER — APPOINTMENT (OUTPATIENT)
Dept: LAB | Facility: MEDICAL CENTER | Age: 76
End: 2021-10-15
Payer: MEDICARE

## 2021-10-15 VITALS
BODY MASS INDEX: 22.5 KG/M2 | SYSTOLIC BLOOD PRESSURE: 126 MMHG | DIASTOLIC BLOOD PRESSURE: 80 MMHG | WEIGHT: 123 LBS | HEART RATE: 72 BPM

## 2021-10-15 DIAGNOSIS — Z12.5 PROSTATE CANCER SCREENING: Primary | ICD-10-CM

## 2021-10-15 DIAGNOSIS — Z12.5 PROSTATE CANCER SCREENING: ICD-10-CM

## 2021-10-15 DIAGNOSIS — N32.81 OVERACTIVE BLADDER: ICD-10-CM

## 2021-10-15 DIAGNOSIS — N20.0 NEPHROLITHIASIS: ICD-10-CM

## 2021-10-15 PROBLEM — R35.1 NOCTURIA: Status: ACTIVE | Noted: 2021-10-15

## 2021-10-15 PROBLEM — R31.29 MICROSCOPIC HEMATURIA: Status: ACTIVE | Noted: 2021-10-15

## 2021-10-15 LAB — PSA SERPL-MCNC: 1.9 NG/ML (ref 0–4)

## 2021-10-15 PROCEDURE — 99213 OFFICE O/P EST LOW 20 MIN: CPT | Performed by: NURSE PRACTITIONER

## 2021-10-15 PROCEDURE — 36415 COLL VENOUS BLD VENIPUNCTURE: CPT

## 2021-10-15 PROCEDURE — G0103 PSA SCREENING: HCPCS

## 2021-10-21 DIAGNOSIS — F41.9 INSOMNIA SECONDARY TO ANXIETY: ICD-10-CM

## 2021-10-21 DIAGNOSIS — I10 ESSENTIAL HYPERTENSION: ICD-10-CM

## 2021-10-21 DIAGNOSIS — F51.05 INSOMNIA SECONDARY TO ANXIETY: ICD-10-CM

## 2021-10-21 RX ORDER — LOSARTAN POTASSIUM 50 MG/1
50 TABLET ORAL DAILY
Qty: 90 TABLET | Refills: 0 | Status: SHIPPED | OUTPATIENT
Start: 2021-10-21 | End: 2022-01-21 | Stop reason: SDUPTHER

## 2021-10-21 RX ORDER — ALPRAZOLAM 0.25 MG/1
0.25 TABLET ORAL
Qty: 30 TABLET | Refills: 0 | Status: SHIPPED | OUTPATIENT
Start: 2021-10-21 | End: 2021-11-22 | Stop reason: SDUPTHER

## 2021-11-16 ENCOUNTER — OFFICE VISIT (OUTPATIENT)
Dept: FAMILY MEDICINE CLINIC | Facility: CLINIC | Age: 76
End: 2021-11-16
Payer: MEDICARE

## 2021-11-16 VITALS
WEIGHT: 134.2 LBS | TEMPERATURE: 97.4 F | HEIGHT: 62 IN | DIASTOLIC BLOOD PRESSURE: 80 MMHG | BODY MASS INDEX: 24.69 KG/M2 | SYSTOLIC BLOOD PRESSURE: 146 MMHG

## 2021-11-16 DIAGNOSIS — Z23 NEED FOR INFLUENZA VACCINATION: ICD-10-CM

## 2021-11-16 DIAGNOSIS — Z00.00 MEDICARE ANNUAL WELLNESS VISIT, SUBSEQUENT: Primary | ICD-10-CM

## 2021-11-16 DIAGNOSIS — F32.1 CURRENT MODERATE EPISODE OF MAJOR DEPRESSIVE DISORDER WITHOUT PRIOR EPISODE (HCC): ICD-10-CM

## 2021-11-16 PROCEDURE — 90662 IIV NO PRSV INCREASED AG IM: CPT | Performed by: PHYSICIAN ASSISTANT

## 2021-11-16 PROCEDURE — G0439 PPPS, SUBSEQ VISIT: HCPCS | Performed by: PHYSICIAN ASSISTANT

## 2021-11-16 PROCEDURE — G0008 ADMIN INFLUENZA VIRUS VAC: HCPCS | Performed by: PHYSICIAN ASSISTANT

## 2021-11-22 DIAGNOSIS — F41.1 GAD (GENERALIZED ANXIETY DISORDER): ICD-10-CM

## 2021-11-22 DIAGNOSIS — F41.9 INSOMNIA SECONDARY TO ANXIETY: ICD-10-CM

## 2021-11-22 DIAGNOSIS — F51.05 INSOMNIA SECONDARY TO ANXIETY: ICD-10-CM

## 2021-11-22 RX ORDER — ALPRAZOLAM 0.25 MG/1
0.25 TABLET ORAL
Qty: 30 TABLET | Refills: 0 | Status: SHIPPED | OUTPATIENT
Start: 2021-11-22

## 2021-11-22 RX ORDER — ESCITALOPRAM OXALATE 20 MG/1
20 TABLET ORAL DAILY
Qty: 30 TABLET | Refills: 2 | Status: SHIPPED | OUTPATIENT
Start: 2021-11-22 | End: 2022-02-16 | Stop reason: SDUPTHER

## 2022-01-21 DIAGNOSIS — I10 ESSENTIAL HYPERTENSION: ICD-10-CM

## 2022-01-21 RX ORDER — LOSARTAN POTASSIUM 50 MG/1
50 TABLET ORAL DAILY
Qty: 90 TABLET | Refills: 0 | Status: SHIPPED | OUTPATIENT
Start: 2022-01-21 | End: 2022-04-13 | Stop reason: SDUPTHER

## 2022-02-16 ENCOUNTER — OFFICE VISIT (OUTPATIENT)
Dept: FAMILY MEDICINE CLINIC | Facility: CLINIC | Age: 77
End: 2022-02-16
Payer: MEDICARE

## 2022-02-16 VITALS
WEIGHT: 139.4 LBS | HEIGHT: 62 IN | SYSTOLIC BLOOD PRESSURE: 134 MMHG | DIASTOLIC BLOOD PRESSURE: 86 MMHG | BODY MASS INDEX: 25.65 KG/M2 | TEMPERATURE: 97 F

## 2022-02-16 DIAGNOSIS — F41.1 GAD (GENERALIZED ANXIETY DISORDER): ICD-10-CM

## 2022-02-16 PROCEDURE — 99213 OFFICE O/P EST LOW 20 MIN: CPT | Performed by: PHYSICIAN ASSISTANT

## 2022-02-16 RX ORDER — ESCITALOPRAM OXALATE 20 MG/1
20 TABLET ORAL DAILY
Qty: 90 TABLET | Refills: 0 | Status: SHIPPED | OUTPATIENT
Start: 2022-02-16 | End: 2022-06-28 | Stop reason: SDUPTHER

## 2022-02-16 NOTE — PROGRESS NOTES
Assessment/Plan:    Problem List Items Addressed This Visit     None      Visit Diagnoses     BMI 25 0-25 9,adult    -  Primary    ELOISA (generalized anxiety disorder)        Relevant Medications    escitalopram (LEXAPRO) 20 mg tablet           Diagnoses and all orders for this visit:    BMI 25 0-25 9,adult    ELOISA (generalized anxiety disorder)  -     escitalopram (LEXAPRO) 20 mg tablet; Take 1 tablet (20 mg total) by mouth daily        No problem-specific Assessment & Plan notes found for this encounter  Subjective:      Patient ID: Emmanuel Colón is a 68 y o  male  Pt is here today for 3 month follow up, denies any new problems  Pt refuses to return to GI at this time for colonoscopy, "cannot tolerate prep " Pt denies blood in stool, no GI problems/pain, has been gaining weight appropriately  The following portions of the patient's history were reviewed and updated as appropriate:   He has a past medical history of Acute gouty arthropathy, Arthritis, Bacterial pneumonia, Depression, Fracture of rib, closed, Gout, Hyperlipidemia, Hypertension, and Melanoma ([facility] [address] )  ,  does not have any pertinent problems on file  ,   has a past surgical history that includes Eye surgery; Malignant skin lesion excision; pr shldr arthroscop,lyse adhesns (Right, 8/28/2017); and Mohs surgery  ,  family history includes Aneurysm in his father; Aortic aneurysm in his family; Cancer in his mother; No Known Problems in his sister  ,   reports that he has never smoked  He has never used smokeless tobacco  He reports previous alcohol use  He reports that he does not use drugs  ,  has No Known Allergies     Current Outpatient Medications   Medication Sig Dispense Refill    ALPRAZolam (XANAX) 0 25 mg tablet Take 1 tablet (0 25 mg total) by mouth daily at bedtime as needed for anxiety or sleep 30 tablet 0    cyanocobalamin (VITAMIN B-12) 1,000 mcg tablet Take 1,000 mcg by mouth daily        escitalopram (LEXAPRO) 20 mg tablet Take 1 tablet (20 mg total) by mouth daily 90 tablet 0    losartan (COZAAR) 50 mg tablet Take 1 tablet (50 mg total) by mouth daily 90 tablet 0    Mirabegron ER 50 MG TB24 Take 1 tablet (50 mg total) by mouth daily 30 tablet 12    Misc Natural Products (OSTEO BI-FLEX ADV TRIPLE ST PO) Take by mouth      Multiple Vitamin (MULTIVITAMIN) tablet Take 1 tablet by mouth daily      valACYclovir (VALTREX) 1,000 mg tablet TAKE 2 TABLETS BY MOUTH AT ONSET THEN 2 TABLETS AFTER 12 HOURS      VITAMIN D, CHOLECALCIFEROL, PO Take 2,000 Units by mouth daily   ibuprofen (MOTRIN) 200 mg tablet Take 200 mg by mouth every 6 (six) hours as needed for mild pain (Patient not taking: Reported on 8/19/2021)      mesalamine (ASACOL) 800 MG EC tablet Take 2 tablets (1,600 mg total) by mouth 3 (three) times a day (Patient not taking: Reported on 8/12/2020) 180 tablet 2    sulfaSALAzine (AZULFIDINE-EN) 500 mg EC tablet Take 2 tablets (1,000 mg total) by mouth 2 (two) times a day (Patient not taking: Reported on 8/19/2021) 120 tablet 3     No current facility-administered medications for this visit  Review of Systems   Constitutional: Negative for activity change, appetite change, chills, diaphoresis, fatigue, fever and unexpected weight change  HENT: Negative for congestion, ear pain, postnasal drip, rhinorrhea, sinus pressure, sinus pain, sneezing, sore throat, tinnitus and voice change  Eyes: Negative for pain, redness and visual disturbance  Respiratory: Negative for cough, chest tightness, shortness of breath and wheezing  Cardiovascular: Negative for chest pain, palpitations and leg swelling  Gastrointestinal: Negative for abdominal pain, blood in stool, constipation, diarrhea, nausea and vomiting  Genitourinary: Negative for difficulty urinating, dysuria, frequency, hematuria and urgency     Musculoskeletal: Negative for arthralgias, back pain, gait problem, joint swelling, myalgias, neck pain and neck stiffness  Skin: Negative for color change, pallor, rash and wound  Neurological: Negative for dizziness, tremors, weakness, light-headedness and headaches  Psychiatric/Behavioral: Negative for dysphoric mood, self-injury, sleep disturbance and suicidal ideas  The patient is not nervous/anxious  Objective:  Vitals:    02/16/22 0757   BP: 134/86   Temp: (!) 97 °F (36 1 °C)   Weight: 63 2 kg (139 lb 6 4 oz)   Height: 5' 2" (1 575 m)     Body mass index is 25 5 kg/m²  Physical Exam  Vitals reviewed  Constitutional:       General: He is not in acute distress  Appearance: He is well-developed  He is not diaphoretic  HENT:      Head: Normocephalic and atraumatic  Right Ear: Hearing, tympanic membrane, ear canal and external ear normal       Left Ear: Hearing, tympanic membrane, ear canal and external ear normal       Mouth/Throat:      Pharynx: Uvula midline  No oropharyngeal exudate  Eyes:      General: No scleral icterus  Right eye: No discharge  Left eye: No discharge  Conjunctiva/sclera: Conjunctivae normal    Neck:      Thyroid: No thyromegaly  Vascular: No carotid bruit  Cardiovascular:      Rate and Rhythm: Normal rate and regular rhythm  Heart sounds: Normal heart sounds  No murmur heard  Pulmonary:      Effort: Pulmonary effort is normal  No respiratory distress  Breath sounds: Normal breath sounds  No wheezing  Abdominal:      General: Bowel sounds are normal  There is no distension  Palpations: Abdomen is soft  There is no mass  Tenderness: There is no abdominal tenderness  There is no guarding or rebound  Musculoskeletal:         General: No tenderness  Normal range of motion  Cervical back: Neck supple  Lymphadenopathy:      Cervical: No cervical adenopathy  Skin:     General: Skin is warm and dry  Findings: No erythema or rash     Neurological:      Mental Status: He is alert and oriented to person, place, and time  Psychiatric:         Behavior: Behavior normal          Thought Content: Thought content normal          Judgment: Judgment normal              BMI Counseling: Body mass index is 25 5 kg/m²  The BMI is above normal  Nutrition recommendations include reducing portion sizes, decreasing overall calorie intake and 3-5 servings of fruits/vegetables daily  Exercise recommendations include moderate aerobic physical activity for 150 minutes/week

## 2022-04-13 DIAGNOSIS — I10 ESSENTIAL HYPERTENSION: ICD-10-CM

## 2022-04-13 RX ORDER — LOSARTAN POTASSIUM 50 MG/1
50 TABLET ORAL DAILY
Qty: 90 TABLET | Refills: 0 | Status: SHIPPED | OUTPATIENT
Start: 2022-04-13 | End: 2022-07-13 | Stop reason: SDUPTHER

## 2022-04-27 ENCOUNTER — RA CDI HCC (OUTPATIENT)
Dept: OTHER | Facility: HOSPITAL | Age: 77
End: 2022-04-27

## 2022-04-27 NOTE — PROGRESS NOTES
[facility] [address]  coding opportunities       Chart reviewed, no opportunity found: CHART REVIEWED, NO OPPORTUNITY FOUND        Patients Insurance     Medicare Insurance: Medicare

## 2022-05-18 ENCOUNTER — OFFICE VISIT (OUTPATIENT)
Dept: FAMILY MEDICINE CLINIC | Facility: CLINIC | Age: 77
End: 2022-05-18
Payer: MEDICARE

## 2022-05-18 VITALS
TEMPERATURE: 96.6 F | OXYGEN SATURATION: 98 % | DIASTOLIC BLOOD PRESSURE: 82 MMHG | BODY MASS INDEX: 25.69 KG/M2 | SYSTOLIC BLOOD PRESSURE: 128 MMHG | HEART RATE: 62 BPM | HEIGHT: 62 IN | WEIGHT: 139.6 LBS

## 2022-05-18 DIAGNOSIS — F32.1 CURRENT MODERATE EPISODE OF MAJOR DEPRESSIVE DISORDER WITHOUT PRIOR EPISODE (HCC): ICD-10-CM

## 2022-05-18 DIAGNOSIS — I10 ESSENTIAL HYPERTENSION: Primary | ICD-10-CM

## 2022-05-18 PROBLEM — R63.4 WEIGHT LOSS, UNINTENTIONAL: Status: RESOLVED | Noted: 2020-06-26 | Resolved: 2022-05-18

## 2022-05-18 PROCEDURE — 99213 OFFICE O/P EST LOW 20 MIN: CPT | Performed by: PHYSICIAN ASSISTANT

## 2022-05-18 NOTE — PROGRESS NOTES
Assessment/Plan:    Problem List Items Addressed This Visit        Cardiovascular and Mediastinum    Essential hypertension - Primary       Other    Current moderate episode of major depressive disorder without prior episode ([facility] [address] )           Diagnoses and all orders for this visit:    Essential hypertension    Current moderate episode of major depressive disorder without prior episode ([facility] [address] )      No problem-specific Assessment & Plan notes found for this encounter  Subjective:      Patient ID: Manav Wong is a 68 y o  male  Juan Jose Disla is here today for follow up  Doing well  Yesterday while mowing, he drove mower under a low branch and has superficial scrapes to L forehead and nose  The following portions of the patient's history were reviewed and updated as appropriate:   He has a past medical history of Acute gouty arthropathy, Arthritis, Bacterial pneumonia, Depression, Fracture of rib, closed, Gout, Hyperlipidemia, Hypertension, and Melanoma ([facility] [address] )  ,  does not have any pertinent problems on file  ,   has a past surgical history that includes Eye surgery; Malignant skin lesion excision; pr shldr arthroscop,lyse adhesns (Right, 8/28/2017); and Mohs surgery  ,  family history includes Aneurysm in his father; Aortic aneurysm in his family; Cancer in his mother; No Known Problems in his sister  ,   reports that he has never smoked  He has never used smokeless tobacco  He reports previous alcohol use  He reports that he does not use drugs  ,  has No Known Allergies     Current Outpatient Medications   Medication Sig Dispense Refill    ALPRAZolam (XANAX) 0 25 mg tablet Take 1 tablet (0 25 mg total) by mouth daily at bedtime as needed for anxiety or sleep 30 tablet 0    cyanocobalamin (VITAMIN B-12) 1,000 mcg tablet Take 1,000 mcg by mouth daily        escitalopram (LEXAPRO) 20 mg tablet Take 1 tablet (20 mg total) by mouth daily 90 tablet 0    losartan (COZAAR) 50 mg tablet Take 1 tablet (50 mg total) by mouth daily 90 tablet 0    Mirabegron ER 50 MG TB24 Take 1 tablet (50 mg total) by mouth daily 30 tablet 12    Misc Natural Products (OSTEO BI-FLEX ADV TRIPLE ST PO) Take by mouth      Multiple Vitamin (MULTIVITAMIN) tablet Take 1 tablet by mouth daily      valACYclovir (VALTREX) 1,000 mg tablet TAKE 2 TABLETS BY MOUTH AT ONSET THEN 2 TABLETS AFTER 12 HOURS      VITAMIN D, CHOLECALCIFEROL, PO Take 2,000 Units by mouth daily   ibuprofen (MOTRIN) 200 mg tablet Take 200 mg by mouth every 6 (six) hours as needed for mild pain (Patient not taking: No sig reported)      mesalamine (ASACOL) 800 MG EC tablet Take 2 tablets (1,600 mg total) by mouth 3 (three) times a day (Patient not taking: Reported on 8/12/2020) 180 tablet 2    sulfaSALAzine (AZULFIDINE-EN) 500 mg EC tablet Take 2 tablets (1,000 mg total) by mouth 2 (two) times a day (Patient not taking: Reported on 8/19/2021) 120 tablet 3     No current facility-administered medications for this visit  Review of Systems   Constitutional: Negative for activity change, appetite change, chills, diaphoresis, fatigue, fever and unexpected weight change  HENT: Negative for congestion, ear pain, postnasal drip, rhinorrhea, sinus pressure, sinus pain, sneezing, sore throat, tinnitus and voice change  Eyes: Negative for pain, redness and visual disturbance  Respiratory: Negative for cough, chest tightness, shortness of breath and wheezing  Cardiovascular: Negative for chest pain, palpitations and leg swelling  Gastrointestinal: Negative for abdominal pain, blood in stool, constipation, diarrhea, nausea and vomiting  Genitourinary: Negative for difficulty urinating, dysuria, frequency, hematuria and urgency  Musculoskeletal: Negative for arthralgias, back pain, gait problem, joint swelling, myalgias, neck pain and neck stiffness  Skin: Positive for wound  Negative for color change, pallor and rash     Neurological: Negative for dizziness, tremors, weakness, light-headedness and headaches  Psychiatric/Behavioral: Negative for dysphoric mood, self-injury, sleep disturbance and suicidal ideas  The patient is not nervous/anxious  Objective:  Vitals:    05/18/22 0832   BP: 128/82   Pulse: 62   Temp: (!) 96 6 °F (35 9 °C)   SpO2: 98%   Weight: 63 3 kg (139 lb 9 6 oz)   Height: 5' 2" (1 575 m)     Body mass index is 25 53 kg/m²  Physical Exam  Vitals reviewed  Constitutional:       General: He is not in acute distress  Appearance: He is well-developed  He is not diaphoretic  HENT:      Head: Normocephalic and atraumatic  Right Ear: Hearing, tympanic membrane, ear canal and external ear normal       Left Ear: Hearing, tympanic membrane, ear canal and external ear normal       Mouth/Throat:      Pharynx: Uvula midline  No oropharyngeal exudate  Eyes:      General: No scleral icterus  Right eye: No discharge  Left eye: No discharge  Conjunctiva/sclera: Conjunctivae normal    Neck:      Thyroid: No thyromegaly  Vascular: No carotid bruit  Cardiovascular:      Rate and Rhythm: Normal rate and regular rhythm  Heart sounds: Normal heart sounds  No murmur heard  Pulmonary:      Effort: Pulmonary effort is normal  No respiratory distress  Breath sounds: Normal breath sounds  No wheezing  Abdominal:      General: Bowel sounds are normal  There is no distension  Palpations: Abdomen is soft  There is no mass  Tenderness: There is no abdominal tenderness  There is no guarding or rebound  Musculoskeletal:         General: No tenderness  Normal range of motion  Cervical back: Neck supple  Lymphadenopathy:      Cervical: No cervical adenopathy  Skin:     General: Skin is warm and dry  Findings: No erythema or rash  Comments: L forehead and nose with superficial abrasions from tree branch     Neurological:      Mental Status: He is alert and oriented to person, place, and time    Psychiatric:         Behavior: Behavior normal          Thought Content:  Thought content normal          Judgment: Judgment normal

## 2022-06-28 DIAGNOSIS — F41.1 GAD (GENERALIZED ANXIETY DISORDER): ICD-10-CM

## 2022-06-28 RX ORDER — ESCITALOPRAM OXALATE 20 MG/1
20 TABLET ORAL DAILY
Qty: 90 TABLET | Refills: 0 | Status: SHIPPED | OUTPATIENT
Start: 2022-06-28 | End: 2022-09-26

## 2022-07-13 DIAGNOSIS — I10 ESSENTIAL HYPERTENSION: ICD-10-CM

## 2022-07-13 RX ORDER — LOSARTAN POTASSIUM 50 MG/1
50 TABLET ORAL DAILY
Qty: 90 TABLET | Refills: 0 | Status: SHIPPED | OUTPATIENT
Start: 2022-07-13 | End: 2022-10-11 | Stop reason: SDUPTHER

## 2022-08-18 ENCOUNTER — OFFICE VISIT (OUTPATIENT)
Dept: FAMILY MEDICINE CLINIC | Facility: CLINIC | Age: 77
End: 2022-08-18
Payer: MEDICARE

## 2022-08-18 VITALS
WEIGHT: 131.6 LBS | HEIGHT: 62 IN | OXYGEN SATURATION: 97 % | TEMPERATURE: 97.4 F | HEART RATE: 80 BPM | SYSTOLIC BLOOD PRESSURE: 112 MMHG | BODY MASS INDEX: 24.22 KG/M2 | DIASTOLIC BLOOD PRESSURE: 72 MMHG

## 2022-08-18 DIAGNOSIS — F41.9 ANXIETY: ICD-10-CM

## 2022-08-18 DIAGNOSIS — K50.911 CROHN'S DISEASE WITH RECTAL BLEEDING, UNSPECIFIED GASTROINTESTINAL TRACT LOCATION (HCC): ICD-10-CM

## 2022-08-18 DIAGNOSIS — I10 ESSENTIAL HYPERTENSION: Primary | ICD-10-CM

## 2022-08-18 DIAGNOSIS — R63.4 UNINTENTIONAL WEIGHT LOSS: ICD-10-CM

## 2022-08-18 DIAGNOSIS — E63.9 INADEQUATE NUTRITION: ICD-10-CM

## 2022-08-18 DIAGNOSIS — F32.1 CURRENT MODERATE EPISODE OF MAJOR DEPRESSIVE DISORDER WITHOUT PRIOR EPISODE (HCC): ICD-10-CM

## 2022-08-18 PROBLEM — K50.90 CROHN DISEASE (HCC): Status: ACTIVE | Noted: 2020-06-12

## 2022-08-18 PROCEDURE — 99213 OFFICE O/P EST LOW 20 MIN: CPT | Performed by: PHYSICIAN ASSISTANT

## 2022-08-18 NOTE — PROGRESS NOTES
Assessment/Plan:    Problem List Items Addressed This Visit        Cardiovascular and Mediastinum    Essential hypertension - Primary       Other    Crohn disease ([facility] )    Current moderate episode of major depressive disorder without prior episode ([facility] )    Anxiety      Other Visit Diagnoses     Unintentional weight loss        Relevant Orders    Ambulatory Referral to Social Work Care Management Program    Inadequate nutrition        Relevant Orders    Ambulatory Referral to Social Work Care Management Program           Diagnoses and all orders for this visit:    Essential hypertension    Current moderate episode of major depressive disorder without prior episode ([facility] )    Anxiety    Crohn's disease with rectal bleeding, unspecified gastrointestinal tract location ([facility] )    Unintentional weight loss  -     Ambulatory Referral to Social Work Care Management Program; Future    Inadequate nutrition  -     Ambulatory Referral to Social Work Care Management Program; Future      No problem-specific Assessment & Plan notes found for this encounter  Subjective:      Patient ID: Annette Wong is a 68 y o  male  Lul Youngls is here today for 3 month follow up  Remains stable, has lost approximately 8 lbs since last OV  States that sometimes he struggles with the hot bar food at [facility], not always something he likes  Suggested he buy twice as much of something he likes or consider Mom's meals or meals on wheels  He will consider these  The following portions of the patient's history were reviewed and updated as appropriate:   He has a past medical history of Acute gouty arthropathy, Arthritis, Bacterial pneumonia, Depression, Fracture of rib, closed, Gout, Hyperlipidemia, Hypertension, and Melanoma ([facility] )  ,  does not have any pertinent problems on file  ,   has a past surgical history that includes Eye surgery;  Malignant skin lesion excision; pr jazzmine arthroscop,sky benavides (Right, 8/28/2017); and Mohs surgery  ,  family history includes Aneurysm in his father; Aortic aneurysm in his family; Cancer in his mother; No Known Problems in his sister  ,   reports that he has never smoked  He has never used smokeless tobacco  He reports previous alcohol use  He reports that he does not use drugs  ,  has No Known Allergies     Current Outpatient Medications   Medication Sig Dispense Refill    ALPRAZolam (XANAX) 0 25 mg tablet Take 1 tablet (0 25 mg total) by mouth daily at bedtime as needed for anxiety or sleep 30 tablet 0    cyanocobalamin (VITAMIN B-12) 1,000 mcg tablet Take 1,000 mcg by mouth daily   escitalopram (LEXAPRO) 20 mg tablet Take 1 tablet (20 mg total) by mouth daily 90 tablet 0    losartan (COZAAR) 50 mg tablet Take 1 tablet (50 mg total) by mouth daily 90 tablet 0    Mirabegron ER 50 MG TB24 Take 1 tablet (50 mg total) by mouth daily 30 tablet 12    Misc Natural Products (OSTEO BI-FLEX ADV TRIPLE ST PO) Take by mouth      Multiple Vitamin (MULTIVITAMIN) tablet Take 1 tablet by mouth daily      valACYclovir (VALTREX) 1,000 mg tablet TAKE 2 TABLETS BY MOUTH AT ONSET THEN 2 TABLETS AFTER 12 HOURS      VITAMIN D, CHOLECALCIFEROL, PO Take 2,000 Units by mouth daily  No current facility-administered medications for this visit  Review of Systems   Constitutional: Negative for activity change, appetite change, chills, diaphoresis, fatigue, fever and unexpected weight change  HENT: Negative for congestion, ear pain, postnasal drip, rhinorrhea, sinus pressure, sinus pain, sneezing, sore throat, tinnitus and voice change  Eyes: Negative for pain, redness and visual disturbance  Respiratory: Negative for cough, chest tightness, shortness of breath and wheezing  Cardiovascular: Negative for chest pain, palpitations and leg swelling  Gastrointestinal: Negative for abdominal pain, blood in stool, constipation, diarrhea, nausea and vomiting     Genitourinary: Negative for difficulty urinating, dysuria, frequency, hematuria and urgency  Musculoskeletal: Negative for arthralgias, back pain, gait problem, joint swelling, myalgias, neck pain and neck stiffness  Skin: Negative for color change, pallor, rash and wound  Neurological: Negative for dizziness, tremors, weakness, light-headedness and headaches  Psychiatric/Behavioral: Negative for dysphoric mood, self-injury, sleep disturbance and suicidal ideas  The patient is not nervous/anxious  Objective:  Vitals:    08/18/22 0811   BP: 112/72   Pulse: 80   Temp: (!) 97 4 °F (36 3 °C)   SpO2: 97%   Weight: 59 7 kg (131 lb 9 6 oz)   Height: 5' 2" (1 575 m)     Body mass index is 24 07 kg/m²  Physical Exam  Vitals reviewed  Constitutional:       General: He is not in acute distress  Appearance: He is well-developed  He is not diaphoretic  HENT:      Head: Normocephalic and atraumatic  Right Ear: Hearing, tympanic membrane, ear canal and external ear normal       Left Ear: Hearing, tympanic membrane, ear canal and external ear normal       Mouth/Throat:      Pharynx: Uvula midline  No oropharyngeal exudate  Eyes:      General: No scleral icterus  Right eye: No discharge  Left eye: No discharge  Conjunctiva/sclera: Conjunctivae normal    Neck:      Thyroid: No thyromegaly  Vascular: No carotid bruit  Cardiovascular:      Rate and Rhythm: Normal rate and regular rhythm  Heart sounds: Normal heart sounds  No murmur heard  Pulmonary:      Effort: Pulmonary effort is normal  No respiratory distress  Breath sounds: Normal breath sounds  No wheezing  Abdominal:      General: Bowel sounds are normal  There is no distension  Palpations: Abdomen is soft  There is no mass  Tenderness: There is no abdominal tenderness  There is no guarding or rebound  Musculoskeletal:         General: No tenderness  Normal range of motion  Cervical back: Neck supple     Lymphadenopathy: Cervical: No cervical adenopathy  Skin:     General: Skin is warm and dry  Findings: No erythema or rash  Neurological:      Mental Status: He is alert and oriented to person, place, and time  Psychiatric:         Behavior: Behavior normal          Thought Content:  Thought content normal          Judgment: Judgment normal

## 2022-08-23 ENCOUNTER — PATIENT OUTREACH (OUTPATIENT)
Dept: FAMILY MEDICINE CLINIC | Facility: CLINIC | Age: 77
End: 2022-08-23

## 2022-08-23 NOTE — PROGRESS NOTES
MATTHEW REILLY reviewed chart  PCP referred patient for SW f/u d/t concerns regarding patient's nutritional needs  Patient shared that his wife did all of the cooking, but since she has passed away he buys food from the grocery store hot bar  Patient lives alone  MATTHEW REILLY placed call to patient to introduce self and offer assistance  Patient did not answer  MATTHEW REILLY left message asking patient to return call

## 2022-08-30 ENCOUNTER — PATIENT OUTREACH (OUTPATIENT)
Dept: FAMILY MEDICINE CLINIC | Facility: CLINIC | Age: 77
End: 2022-08-30

## 2022-08-30 ENCOUNTER — TELEPHONE (OUTPATIENT)
Dept: FAMILY MEDICINE CLINIC | Facility: CLINIC | Age: 77
End: 2022-08-30

## 2022-08-30 NOTE — LETTER
[phone]    Re: Brooke Bindu to Reach   8/30/2022       Dear Deonte Host am a [address]  and wanted to be certain you had information to contact me should you desire assistance with or have questions about non-medical aspects of your care such as [but not limited to] medical insurance, housing, transportation, material needs, or emergency needs  If I do not have an answer I will assist you in finding the appropriate agency or individual who can help  Please feel free to contact me at [phone]  Thank You      Sincerely,         Js Shelby LCSW

## 2022-08-30 NOTE — TELEPHONE ENCOUNTER
Palomo Gale LCSW  You 1 minute ago (8:41 AM)     NELI Cisneros,     I have been unable to reach Willis by phone and am sending him a letter  If he happens to call the office, please give him my phone number  Thank you    Routing comment      Palomo Gale LCSW 1 minute ago (8:41 AM)     NELI CASTRO CM made second outreach attempt to patient to introduce self and offer assistance  Patient did not answer  MATTHEW REILLY left message asking patient to return call  Unable to reach letter will be sent   MATTHEW REILLY will close referral and remain available for any future needs

## 2022-08-30 NOTE — PROGRESS NOTES
MATTHEW REILLY made second outreach attempt to patient to introduce self and offer assistance  Patient did not answer  MATTHEW REILLY left message asking patient to return call  Unable to reach letter will be sent  MATTHEW REILLY will close referral and remain available for any future needs  Received call from patient  He stated that he did not know my number and did not answer the phone  Discussed reason for referral  - to assist with nutritional assistance  Patient stated he is not sure he would like meals because he is a picky eater  Stated that his wife did all of the cooking and she passed away a little over two years ago  Patient stated he does not eat big portions  Stated he does not want to have a lot of food left in the house and does not like left overs  Patient declined any help at this time as he is not ready to try any options for meals to be delivered to him  MATTHEW REILLY encouraged patient to call with any needs in the future or to ask his PCP's office to connect us again  MATTHEW REILLY will remain available for any needs

## 2022-10-11 DIAGNOSIS — I10 ESSENTIAL HYPERTENSION: ICD-10-CM

## 2022-10-11 RX ORDER — LOSARTAN POTASSIUM 50 MG/1
50 TABLET ORAL DAILY
Qty: 90 TABLET | Refills: 0 | Status: SHIPPED | OUTPATIENT
Start: 2022-10-11

## 2022-10-12 PROBLEM — Z12.5 PROSTATE CANCER SCREENING: Status: RESOLVED | Noted: 2021-10-15 | Resolved: 2022-10-12

## 2022-11-17 ENCOUNTER — OFFICE VISIT (OUTPATIENT)
Dept: FAMILY MEDICINE CLINIC | Facility: CLINIC | Age: 77
End: 2022-11-17

## 2022-11-17 VITALS
HEIGHT: 62 IN | BODY MASS INDEX: 24.77 KG/M2 | TEMPERATURE: 98.2 F | WEIGHT: 134.6 LBS | DIASTOLIC BLOOD PRESSURE: 76 MMHG | SYSTOLIC BLOOD PRESSURE: 132 MMHG

## 2022-11-17 DIAGNOSIS — F41.9 ANXIETY: ICD-10-CM

## 2022-11-17 DIAGNOSIS — I10 ESSENTIAL HYPERTENSION: Primary | ICD-10-CM

## 2022-11-17 DIAGNOSIS — Z23 NEED FOR INFLUENZA VACCINATION: ICD-10-CM

## 2022-11-17 DIAGNOSIS — Z00.00 MEDICARE ANNUAL WELLNESS VISIT, SUBSEQUENT: ICD-10-CM

## 2022-11-17 NOTE — PROGRESS NOTES
Assessment and Plan:     Problem List Items Addressed This Visit        Cardiovascular and Mediastinum    Essential hypertension - Primary       Other    Anxiety   Other Visit Diagnoses     Medicare annual wellness visit, subsequent        Need for influenza vaccination        Relevant Orders    influenza vaccine, high-dose, PF 0 7 mL (FLUZONE HIGH-DOSE) (Completed)           Preventive health issues were discussed with patient, and age appropriate screening tests were ordered as noted in patient's After Visit Summary  Personalized health advice and appropriate referrals for health education or preventive services given if needed, as noted in patient's After Visit Summary  History of Present Illness:     Patient presents for a Medicare Wellness Visit    HPI   Patient Care Team:  Cleo Bianchi PA-C as PCP - General (Physician Assistant)  Stephanie Adams MD     Review of Systems:     Review of Systems   Constitutional: Negative for activity change, appetite change, chills, diaphoresis, fatigue, fever and unexpected weight change  HENT: Negative for congestion, ear pain, postnasal drip, rhinorrhea, sinus pressure, sinus pain, sneezing, sore throat, tinnitus and voice change  Eyes: Negative for pain, redness and visual disturbance  Respiratory: Negative for cough, chest tightness, shortness of breath and wheezing  Cardiovascular: Negative for chest pain, palpitations and leg swelling  Gastrointestinal: Negative for abdominal pain, blood in stool, constipation, diarrhea, nausea and vomiting  Genitourinary: Negative for difficulty urinating, dysuria, frequency, hematuria and urgency  Musculoskeletal: Negative for arthralgias, back pain, gait problem, joint swelling, myalgias, neck pain and neck stiffness  Skin: Negative for color change, pallor, rash and wound  Neurological: Negative for dizziness, tremors, weakness, light-headedness and headaches     Psychiatric/Behavioral: Negative for dysphoric mood, self-injury, sleep disturbance and suicidal ideas  The patient is not nervous/anxious  Problem List:     Patient Active Problem List   Diagnosis   • Rotator cuff tear arthropathy of right shoulder   • Arthritis   • Depression   • Dyslipidemia   • Erectile dysfunction of non-organic origin   • Essential hypertension   • History of malignant melanoma   • Primary osteoarthritis of both shoulders   • Primary osteoarthritis of first carpometacarpal joint of right hand   • Crohn disease ([facility] )   • Diarrhea   • Current moderate episode of major depressive disorder without prior episode ([facility] )   • Microscopic hematuria   • Nephrolithiasis   • Nocturia   • Anxiety      Past Medical and Surgical History:     Past Medical History:   Diagnosis Date   • Acute gouty arthropathy     Last Assessed: 6/9/2015   • Arthritis    • Bacterial pneumonia     Last Assessed: 6/21/2016   • Depression    • Fracture of rib, closed     Last Assessed: 6/21/2016   • Gout    • Hyperlipidemia    • Hypertension    • Melanoma ([facility] )      Past Surgical History:   Procedure Laterality Date   • EYE SURGERY     • MALIGNANT SKIN LESION EXCISION     • MOHS SURGERY      shaving of lesion moh's technique    • OH SHLDR ARTHROSCOP,LYSE ADHESNS Right 8/28/2017    Procedure: ARTHROSCOPY [facility] UNDER GENERAL ANESTHESIA ,LYSIS OF ADHESIONS, CAPSULECTOMY;  Surgeon: Ilia Licona MD;  Location: [facility];  Service: Orthopedics      Family History:     Family History   Problem Relation Age of Onset   • Cancer Mother    • Aneurysm Father    • No Known Problems Sister    • Aortic aneurysm Family       Social History:     Social History     Socioeconomic History   • Marital status:       Spouse name: None   • Number of children: None   • Years of education: None   • Highest education level: None   Occupational History   • Occupation: Retired    Tobacco Use   • Smoking status: Never   • Smokeless tobacco: Never   Vaping Use   • Vaping Use: Never used   Substance and Sexual Activity   • Alcohol use: Not Currently     Comment: rare, being a social drinker    • Drug use: Never   • Sexual activity: Yes   Other Topics Concern   • None   Social History Narrative   • None     Social Determinants of Health     Financial Resource Strain: Medium Risk   • Difficulty of Paying Living Expenses: Somewhat hard   Food Insecurity: Not on file   Transportation Needs: No Transportation Needs   • Lack of Transportation (Medical): No   • Lack of Transportation (Non-Medical): No   Physical Activity: Not on file   Stress: Not on file   Social Connections: Not on file   Intimate Partner Violence: Not on file   Housing Stability: Not on file      Medications and Allergies:     Current Outpatient Medications   Medication Sig Dispense Refill   • ALPRAZolam (XANAX) 0 25 mg tablet Take 1 tablet (0 25 mg total) by mouth daily at bedtime as needed for anxiety or sleep 30 tablet 0   • cyanocobalamin (VITAMIN B-12) 1,000 mcg tablet Take 1,000 mcg by mouth daily  • escitalopram (LEXAPRO) 20 mg tablet Take 1 tablet (20 mg total) by mouth daily 90 tablet 0   • losartan (COZAAR) 50 mg tablet Take 1 tablet (50 mg total) by mouth daily 90 tablet 0   • Mirabegron ER 50 MG TB24 Take 1 tablet (50 mg total) by mouth daily 30 tablet 12   • Misc Natural Products (OSTEO BI-FLEX ADV TRIPLE ST PO) Take by mouth     • Multiple Vitamin (MULTIVITAMIN) tablet Take 1 tablet by mouth daily     • valACYclovir (VALTREX) 1,000 mg tablet TAKE 2 TABLETS BY MOUTH AT ONSET THEN 2 TABLETS AFTER 12 HOURS     • VITAMIN D, CHOLECALCIFEROL, PO Take 2,000 Units by mouth daily  No current facility-administered medications for this visit       No Known Allergies   Immunizations:     Immunization History   Administered Date(s) Administered   • COVID-19 MODERNA VACC 0 5 ML IM 02/04/2021, 03/04/2021   • Influenza, high dose seasonal 0 7 mL 09/10/2019, 09/15/2020, 11/16/2021, 11/17/2022   • Pneumococcal Conjugate 13-Valent 08/29/2018   • Pneumococcal Polysaccharide PPV23 09/10/2019   • Tdap 06/18/2016      Health Maintenance:         Topic Date Due   • Colorectal Cancer Screening  07/07/2021   • Hepatitis C Screening  Completed         Topic Date Due   • Hepatitis B Vaccine (1 of 3 - 3-dose series) Never done   • COVID-19 Vaccine (3 - Booster for [place] Longest series) 08/04/2021      Medicare Screening Tests and Risk Assessments:     Toby Hernandez is here for his Subsequent Wellness visit  Health Risk Assessment:   Patient rates overall health as good  Patient feels that their physical health rating is same  Patient is satisfied with their life  Eyesight was rated as same  Hearing was rated as same  Patient feels that their emotional and mental health rating is same  Patients states they are never, rarely angry  Patient states they are sometimes unusually tired/fatigued  Pain experienced in the last 7 days has been some  Patient's pain rating has been 3/10  Patient states that he has experienced no weight loss or gain in last 6 months  Depression Screening:   PHQ-9 Score: 0      Fall Risk Screening: In the past year, patient has experienced: no history of falling in past year      Home Safety:  Patient has trouble with stairs inside or outside of their home  Patient has working smoke alarms and has working carbon monoxide detector  Home safety hazards include: none  Nutrition:   Current diet is Regular  Activities of Daily Living (ADLs)/Instrumental Activities of Daily Living (IADLs):   Walk and transfer into and out of bed and chair?: Yes  Dress and groom yourself?: Yes    Bathe or shower yourself?: Yes    Feed yourself?  Yes  Do your laundry/housekeeping?: Yes  Manage your money, pay your bills and track your expenses?: Yes  Make your own meals?: Yes    Do your own shopping?: Yes    Previous Hospitalizations:   Any hospitalizations or ED visits within the last 12 months?: No      Advance Care Planning:   Living will: Yes    Durable POA for healthcare: Yes    Advanced directive: Yes      PREVENTIVE SCREENINGS      Cardiovascular Screening:    General: Screening Current      Diabetes Screening:     General: Screening Current      Colorectal Cancer Screening:     General: Risks and Benefits Discussed and Patient Declines      Prostate Cancer Screening:    General: Risks and Benefits Discussed and Patient Declines      Osteoporosis Screening:    General: Patient Declines and Risks and Benefits Discussed      Abdominal Aortic Aneurysm (AAA) Screening:        General: Screening Not Indicated      Lung Cancer Screening:     General: Screening Not Indicated      Hepatitis C Screening:    General: Screening Current    Screening, Brief Intervention, and Referral to Treatment (SBIRT)    Screening  Typical number of drinks in a day: 0  Typical number of drinks in a week: 0  Interpretation: Low risk drinking behavior  Single Item Drug Screening:  How often have you used an illegal drug (including marijuana) or a prescription medication for non-medical reasons in the past year? never    Single Item Drug Screen Score: 0  Interpretation: Negative screen for possible drug use disorder    No results found  Physical Exam:     /76   Temp 98 2 °F (36 8 °C)   Ht 5' 2" (1 575 m)   Wt 61 1 kg (134 lb 9 6 oz)   BMI 24 62 kg/m²     Physical Exam  Vitals and nursing note reviewed  Constitutional:       General: He is not in acute distress  Appearance: He is well-developed  HENT:      Head: Normocephalic and atraumatic  Eyes:      Conjunctiva/sclera: Conjunctivae normal    Cardiovascular:      Rate and Rhythm: Normal rate and regular rhythm  Heart sounds: No murmur heard  Pulmonary:      Effort: Pulmonary effort is normal  No respiratory distress  Breath sounds: Normal breath sounds  Abdominal:      Palpations: Abdomen is soft  Tenderness: There is no abdominal tenderness     Musculoskeletal:         General: No swelling  Cervical back: Neck supple  Skin:     General: Skin is warm and dry  Capillary Refill: Capillary refill takes less than 2 seconds  Neurological:      Mental Status: He is alert     Psychiatric:         Mood and Affect: Mood normal           Gayle Vallejo PA-C

## 2022-11-17 NOTE — PATIENT INSTRUCTIONS
Medicare Preventive Visit Patient Instructions  Thank you for completing your Welcome to Medicare Visit or Medicare Annual Wellness Visit today  Your next wellness visit will be due in one year (11/18/2023)  The screening/preventive services that you may require over the next 5-10 years are detailed below  Some tests may not apply to you based off risk factors and/or age  Screening tests ordered at today's visit but not completed yet may show as past due  Also, please note that scanned in results may not display below  Preventive Screenings:  Service Recommendations Previous Testing/Comments   Colorectal Cancer Screening  · Colonoscopy    · Fecal Occult Blood Test (FOBT)/Fecal Immunochemical Test (FIT)  · Fecal DNA/Cologuard Test  · Flexible Sigmoidoscopy Age: 39-70 years old   Colonoscopy: every 10 years (May be performed more frequently if at higher risk)  OR  FOBT/FIT: every 1 year  OR  Cologuard: every 3 years  OR  Sigmoidoscopy: every 5 years  Screening may be recommended earlier than age 39 if at higher risk for colorectal cancer  Also, an individualized decision between you and your healthcare provider will decide whether screening between the ages of 74-80 would be appropriate   Colonoscopy: 07/07/2020  FOBT/FIT: 06/26/2020  Cologuard: Not on file  Sigmoidoscopy: Not on file          Prostate Cancer Screening Individualized decision between patient and health care provider in men between ages of 53-78   Medicare will cover every 12 months beginning on the day after your 50th birthday PSA: 1 9 ng/mL     Screening Not Indicated     Hepatitis C Screening Once for adults born between 1945 and 1965  More frequently in patients at high risk for Hepatitis C Hep C Antibody: 03/17/2020    Screening Current   Diabetes Screening 1-2 times per year if you're at risk for diabetes or have pre-diabetes Fasting glucose: 100 mg/dL (8/5/2020)  A1C: No results in last 5 years (No results in last 5 years)      Cholesterol Screening Once every 5 years if you don't have a lipid disorder  May order more often based on risk factors  Lipid panel: 09/13/2019  Screening Current      Other Preventive Screenings Covered by Medicare:  1  Abdominal Aortic Aneurysm (AAA) Screening: covered once if your at risk  You're considered to be at risk if you have a family history of AAA or a male between the age of 73-68 who smoking at least 100 cigarettes in your lifetime  2  Lung Cancer Screening: covers low dose CT scan once per year if you meet all of the following conditions: (1) Age 50-69; (2) No signs or symptoms of lung cancer; (3) Current smoker or have quit smoking within the last 15 years; (4) You have a tobacco smoking history of at least 20 pack years (packs per day x number of years you smoked); (5) You get a written order from a healthcare provider  3  Glaucoma Screening: covered annually if you're considered high risk: (1) You have diabetes OR (2) Family history of glaucoma OR (3)  aged 48 and older OR (3)  American aged 72 and older  3  Osteoporosis Screening: covered every 2 years if you meet one of the following conditions: (1) Have a vertebral abnormality; (2) On glucocorticoid therapy for more than 3 months; (3) Have primary hyperparathyroidism; (4) On osteoporosis medications and need to assess response to drug therapy  5  HIV Screening: covered annually if you're between the age of 12-76  Also covered annually if you are younger than 13 and older than 72 with risk factors for HIV infection  For pregnant patients, it is covered up to 3 times per pregnancy      Immunizations:  Immunization Recommendations   Influenza Vaccine Annual influenza vaccination during flu season is recommended for all persons aged >= 6 months who do not have contraindications   Pneumococcal Vaccine   * Pneumococcal conjugate vaccine = PCV13 (Prevnar 13), PCV15 (Vaxneuvance), PCV20 (Prevnar 20)  * Pneumococcal polysaccharide vaccine = PPSV23 (Pneumovax) Adults 2364 years old: 1-3 doses may be recommended based on certain risk factors  Adults 72 years old: 1-2 doses may be recommended based off what pneumonia vaccine you previously received   Hepatitis B Vaccine 3 dose series if at intermediate or high risk (ex: diabetes, end stage renal disease, liver disease)   Tetanus (Td) Vaccine - COST NOT COVERED BY MEDICARE PART B Following completion of primary series, a booster dose should be given every 10 years to maintain immunity against tetanus  Td may also be given as tetanus wound prophylaxis  Tdap Vaccine - COST NOT COVERED BY MEDICARE PART B Recommended at least once for all adults  For pregnant patients, recommended with each pregnancy  Shingles Vaccine (Shingrix) - COST NOT COVERED BY MEDICARE PART B  2 shot series recommended in those aged 48 and above     Health Maintenance Due:      Topic Date Due   • Colorectal Cancer Screening  07/07/2021   • Hepatitis C Screening  Completed     Immunizations Due:      Topic Date Due   • Hepatitis B Vaccine (1 of 3 - 3-dose series) Never done   • COVID-19 Vaccine (3 - Booster for Moderna series) 08/04/2021   • Influenza Vaccine (1) 09/01/2022     Advance Directives   What are advance directives? Advance directives are legal documents that state your wishes and plans for medical care  These plans are made ahead of time in case you lose your ability to make decisions for yourself  Advance directives can apply to any medical decision, such as the treatments you want, and if you want to donate organs  What are the types of advance directives? There are many types of advance directives, and each state has rules about how to use them  You may choose a combination of any of the following:  · Living will: This is a written record of the treatment you want  You can also choose which treatments you do not want, which to limit, and which to stop at a certain time   This includes surgery, medicine, IV fluid, and tube feedings  · Durable power of  for healthcare [facility]): This is a written record that states who you want to make healthcare choices for you when you are unable to make them for yourself  This person, called a proxy, is usually a family member or a friend  You may choose more than 1 proxy  · Do not resuscitate (DNR) order:  A DNR order is used in case your heart stops beating or you stop breathing  It is a request not to have certain forms of treatment, such as CPR  A DNR order may be included in other types of advance directives  · Medical directive: This covers the care that you want if you are in a coma, near death, or unable to make decisions for yourself  You can list the treatments you want for each condition  Treatment may include pain medicine, surgery, blood transfusions, dialysis, IV or tube feedings, and a ventilator (breathing machine)  · Values history: This document has questions about your views, beliefs, and how you feel and think about life  This information can help others choose the care that you would choose  Why are advance directives important? An advance directive helps you control your care  Although spoken wishes may be used, it is better to have your wishes written down  Spoken wishes can be misunderstood, or not followed  Treatments may be given even if you do not want them  An advance directive may make it easier for your family to make difficult choices about your care  © Copyright [employer] 2018 Information is for End User's use only and may not be sold, redistributed or otherwise used for commercial purposes   All illustrations and images included in CareNotes® are the copyrighted property of A D A M , Inc  or [address] [place], [place] [address]

## 2023-01-26 DIAGNOSIS — I10 ESSENTIAL HYPERTENSION: ICD-10-CM

## 2023-01-26 RX ORDER — LOSARTAN POTASSIUM 50 MG/1
50 TABLET ORAL DAILY
Qty: 90 TABLET | Refills: 0 | Status: SHIPPED | OUTPATIENT
Start: 2023-01-26

## 2023-02-15 ENCOUNTER — OFFICE VISIT (OUTPATIENT)
Dept: FAMILY MEDICINE CLINIC | Facility: CLINIC | Age: 78
End: 2023-02-15

## 2023-02-15 VITALS
WEIGHT: 145 LBS | DIASTOLIC BLOOD PRESSURE: 86 MMHG | BODY MASS INDEX: 26.68 KG/M2 | TEMPERATURE: 96.6 F | SYSTOLIC BLOOD PRESSURE: 130 MMHG | HEIGHT: 62 IN

## 2023-02-15 DIAGNOSIS — K50.911 CROHN'S DISEASE WITH RECTAL BLEEDING, UNSPECIFIED GASTROINTESTINAL TRACT LOCATION (HCC): ICD-10-CM

## 2023-02-15 DIAGNOSIS — Z13.89 SCREENING FOR MULTIPLE CONDITIONS: ICD-10-CM

## 2023-02-15 DIAGNOSIS — F32.1 CURRENT MODERATE EPISODE OF MAJOR DEPRESSIVE DISORDER WITHOUT PRIOR EPISODE (HCC): Primary | ICD-10-CM

## 2023-02-15 NOTE — PROGRESS NOTES
Assessment/Plan:    Problem List Items Addressed This Visit        Other    Crohn disease ([facility] )    Current moderate episode of major depressive disorder without prior episode ([facility] ) - Primary   Other Visit Diagnoses     Screening for multiple conditions        Relevant Orders    CBC    Comprehensive metabolic panel    Lipid Panel with Direct LDL reflex    PSA, Total Screen           Diagnoses and all orders for this visit:    Current moderate episode of major depressive disorder without prior episode ([facility] )  Comments:  Stable, continue same medication    Crohn's disease with rectal bleeding, unspecified gastrointestinal tract location [facility])  Comments:  Stable, follows with GI PRN    Screening for multiple conditions  -     CBC; Future  -     Comprehensive metabolic panel; Future  -     Lipid Panel with Direct LDL reflex; Future  -     PSA, Total Screen; Future            Subjective:      Patient ID: Martina Choudhury is a 66 y o  male  HPI    The following portions of the patient's history were reviewed and updated as appropriate:   He has a past medical history of Acute gouty arthropathy, Arthritis, Bacterial pneumonia, Depression, Fracture of rib, closed, Gout, Hyperlipidemia, Hypertension, and Melanoma ([facility] )  ,  does not have any pertinent problems on file  ,   has a past surgical history that includes Eye surgery; Malignant skin lesion excision; pr surgical arthroscopy shoulder w/lss&rescj ads (Right, 8/28/2017); and Mohs surgery  ,  family history includes Aneurysm in his father; Aortic aneurysm in his family; Cancer in his mother; No Known Problems in his sister  ,   reports that he has never smoked  He has never used smokeless tobacco  He reports that he does not currently use alcohol  He reports that he does not use drugs  ,  has No Known Allergies     Current Outpatient Medications   Medication Sig Dispense Refill   • ALPRAZolam (XANAX) 0 25 mg tablet Take 1 tablet (0 25 mg total) by mouth daily at bedtime as needed for anxiety or sleep 30 tablet 0   • cyanocobalamin (VITAMIN B-12) 1,000 mcg tablet Take 1,000 mcg by mouth daily  • escitalopram (LEXAPRO) 20 mg tablet Take 1 tablet (20 mg total) by mouth daily 90 tablet 0   • losartan (COZAAR) 50 mg tablet Take 1 tablet (50 mg total) by mouth daily 90 tablet 0   • Mirabegron ER 50 MG TB24 Take 1 tablet (50 mg total) by mouth daily 30 tablet 12   • Misc Natural Products (OSTEO BI-FLEX ADV TRIPLE ST PO) Take by mouth     • Multiple Vitamin (MULTIVITAMIN) tablet Take 1 tablet by mouth daily     • valACYclovir (VALTREX) 1,000 mg tablet TAKE 2 TABLETS BY MOUTH AT ONSET THEN 2 TABLETS AFTER 12 HOURS     • VITAMIN D, CHOLECALCIFEROL, PO Take 2,000 Units by mouth daily  No current facility-administered medications for this visit  Review of Systems      Objective:  Vitals:    02/15/23 0753   BP: 130/86   Temp: (!) 96 6 °F (35 9 °C)   Weight: 65 8 kg (145 lb)   Height: 5' 2" (1 575 m)     Body mass index is 26 52 kg/m²  Physical Exam      BMI Counseling: Body mass index is 26 52 kg/m²  The BMI is above normal  Nutrition recommendations include decreasing portion sizes, encouraging healthy choices of fruits and vegetables, decreasing fast food intake, consuming healthier snacks, limiting drinks that contain sugar, moderation in carbohydrate intake, increasing intake of lean protein, reducing intake of saturated and trans fat and reducing intake of cholesterol  Exercise recommendations include moderate physical activity 150 minutes/week  Rationale for BMI follow-up plan is due to patient being overweight or obese

## 2023-05-08 ENCOUNTER — APPOINTMENT (OUTPATIENT)
Dept: LAB | Facility: MEDICAL CENTER | Age: 78
End: 2023-05-08

## 2023-05-08 DIAGNOSIS — Z13.89 SCREENING FOR MULTIPLE CONDITIONS: ICD-10-CM

## 2023-05-08 LAB
ALBUMIN SERPL BCP-MCNC: 3.5 G/DL (ref 3.5–5)
ALP SERPL-CCNC: 74 U/L (ref 46–116)
ALT SERPL W P-5'-P-CCNC: 28 U/L (ref 12–78)
ANION GAP SERPL CALCULATED.3IONS-SCNC: 1 MMOL/L (ref 4–13)
AST SERPL W P-5'-P-CCNC: 20 U/L (ref 5–45)
BILIRUB SERPL-MCNC: 0.55 MG/DL (ref 0.2–1)
BUN SERPL-MCNC: 23 MG/DL (ref 5–25)
CALCIUM SERPL-MCNC: 9.4 MG/DL (ref 8.3–10.1)
CHLORIDE SERPL-SCNC: 111 MMOL/L (ref 96–108)
CHOLEST SERPL-MCNC: 185 MG/DL
CO2 SERPL-SCNC: 28 MMOL/L (ref 21–32)
CREAT SERPL-MCNC: 1.08 MG/DL (ref 0.6–1.3)
ERYTHROCYTE [DISTWIDTH] IN BLOOD BY AUTOMATED COUNT: 13.9 % (ref 11.6–15.1)
GFR SERPL CREATININE-BSD FRML MDRD: 65 ML/MIN/1.73SQ M
GLUCOSE P FAST SERPL-MCNC: 85 MG/DL (ref 65–99)
HCT VFR BLD AUTO: 43.4 % (ref 36.5–49.3)
HDLC SERPL-MCNC: 57 MG/DL
HGB BLD-MCNC: 13.9 G/DL (ref 12–17)
LDLC SERPL CALC-MCNC: 115 MG/DL (ref 0–100)
MCH RBC QN AUTO: 28.9 PG (ref 26.8–34.3)
MCHC RBC AUTO-ENTMCNC: 32 G/DL (ref 31.4–37.4)
MCV RBC AUTO: 90 FL (ref 82–98)
PLATELET # BLD AUTO: 246 THOUSANDS/UL (ref 149–390)
PMV BLD AUTO: 8.9 FL (ref 8.9–12.7)
POTASSIUM SERPL-SCNC: 4.7 MMOL/L (ref 3.5–5.3)
PROT SERPL-MCNC: 7.3 G/DL (ref 6.4–8.4)
PSA SERPL-MCNC: 2 NG/ML (ref 0–4)
RBC # BLD AUTO: 4.81 MILLION/UL (ref 3.88–5.62)
SODIUM SERPL-SCNC: 140 MMOL/L (ref 135–147)
TRIGL SERPL-MCNC: 67 MG/DL
WBC # BLD AUTO: 5.82 THOUSAND/UL (ref 4.31–10.16)

## 2023-05-16 ENCOUNTER — OFFICE VISIT (OUTPATIENT)
Dept: FAMILY MEDICINE CLINIC | Facility: CLINIC | Age: 78
End: 2023-05-16

## 2023-05-16 VITALS
OXYGEN SATURATION: 96 % | HEART RATE: 87 BPM | WEIGHT: 145.6 LBS | TEMPERATURE: 97.1 F | BODY MASS INDEX: 26.79 KG/M2 | DIASTOLIC BLOOD PRESSURE: 70 MMHG | SYSTOLIC BLOOD PRESSURE: 124 MMHG | HEIGHT: 62 IN

## 2023-05-16 DIAGNOSIS — K50.911 CROHN'S DISEASE WITH RECTAL BLEEDING, UNSPECIFIED GASTROINTESTINAL TRACT LOCATION (HCC): Primary | ICD-10-CM

## 2023-05-16 DIAGNOSIS — I10 ESSENTIAL HYPERTENSION: ICD-10-CM

## 2023-05-16 DIAGNOSIS — F32.1 CURRENT MODERATE EPISODE OF MAJOR DEPRESSIVE DISORDER WITHOUT PRIOR EPISODE (HCC): ICD-10-CM

## 2023-05-16 DIAGNOSIS — F41.9 ANXIETY: ICD-10-CM

## 2023-05-16 NOTE — PROGRESS NOTES
Name: Rudy Batista      : 1945      MRN: [id number]  Encounter Provider: Fatemeh Brown PA-C  Encounter Date: 2023   Encounter department: [address]     1  Crohn's disease with rectal bleeding, unspecified gastrointestinal tract location [facility])  Assessment & Plan:  Stable, no bleeding at this time  2  Essential hypertension  Assessment & Plan:  Stable, continue same medication  3  Current moderate episode of major depressive disorder without prior episode [facility])  Assessment & Plan:  Stable, continue same      4  Anxiety  Assessment & Plan:  Stable, continue same  Jaymie Christensen is here today for 3 month follow up  He is doing very well, no complaint at this time  Review of Systems   Constitutional: Negative for activity change, appetite change, chills, diaphoresis, fatigue, fever and unexpected weight change  HENT: Negative for congestion, ear pain, postnasal drip, rhinorrhea, sinus pressure, sinus pain, sneezing, sore throat, tinnitus and voice change  Eyes: Negative for pain, redness and visual disturbance  Respiratory: Negative for cough, chest tightness, shortness of breath and wheezing  Cardiovascular: Negative for chest pain, palpitations and leg swelling  Gastrointestinal: Negative for abdominal pain, blood in stool, constipation, diarrhea, nausea and vomiting  Genitourinary: Negative for difficulty urinating, dysuria, frequency, hematuria and urgency  Musculoskeletal: Negative for arthralgias, back pain, gait problem, joint swelling, myalgias, neck pain and neck stiffness  Skin: Negative for color change, pallor, rash and wound  Neurological: Negative for dizziness, tremors, weakness, light-headedness and headaches  Psychiatric/Behavioral: Negative for dysphoric mood, self-injury, sleep disturbance and suicidal ideas  The patient is not nervous/anxious          Current Outpatient Medications on File "Prior to Visit   Medication Sig   • ALPRAZolam (XANAX) 0 25 mg tablet Take 1 tablet (0 25 mg total) by mouth daily at bedtime as needed for anxiety or sleep   • cyanocobalamin (VITAMIN B-12) 1,000 mcg tablet Take 1,000 mcg by mouth daily  • losartan (COZAAR) 50 mg tablet Take 1 tablet (50 mg total) by mouth daily   • Mirabegron ER 50 MG TB24 Take 1 tablet (50 mg total) by mouth daily   • Misc Natural Products (OSTEO BI-FLEX ADV TRIPLE ST PO) Take by mouth   • Multiple Vitamin (MULTIVITAMIN) tablet Take 1 tablet by mouth daily   • valACYclovir (VALTREX) 1,000 mg tablet TAKE 2 TABLETS BY MOUTH AT ONSET THEN 2 TABLETS AFTER 12 HOURS   • VITAMIN D, CHOLECALCIFEROL, PO Take 2,000 Units by mouth daily  • escitalopram (LEXAPRO) 20 mg tablet Take 1 tablet (20 mg total) by mouth daily       Objective     /70   Pulse 87   Temp (!) 97 1 °F (36 2 °C)   Ht 5' 2\" (1 575 m)   Wt 66 kg (145 lb 9 6 oz)   SpO2 96%   BMI 26 63 kg/m²     Physical Exam  Vitals reviewed  Constitutional:       General: He is not in acute distress  Appearance: He is well-developed  He is not diaphoretic  HENT:      Head: Normocephalic and atraumatic  Right Ear: Hearing, tympanic membrane, ear canal and external ear normal       Left Ear: Hearing, tympanic membrane, ear canal and external ear normal       Mouth/Throat:      Pharynx: Uvula midline  No oropharyngeal exudate  Eyes:      General: No scleral icterus  Right eye: No discharge  Left eye: No discharge  Conjunctiva/sclera: Conjunctivae normal    Neck:      Thyroid: No thyromegaly  Vascular: No carotid bruit  Cardiovascular:      Rate and Rhythm: Normal rate and regular rhythm  Heart sounds: Normal heart sounds  No murmur heard  Pulmonary:      Effort: Pulmonary effort is normal  No respiratory distress  Breath sounds: Normal breath sounds  No wheezing  Abdominal:      General: Bowel sounds are normal  There is no distension        " Palpations: Abdomen is soft  There is no mass  Tenderness: There is no abdominal tenderness  There is no guarding or rebound  Musculoskeletal:         General: No tenderness  Normal range of motion  Cervical back: Neck supple  Lymphadenopathy:      Cervical: No cervical adenopathy  Skin:     General: Skin is warm and dry  Findings: No erythema or rash  Neurological:      Mental Status: He is alert and oriented to person, place, and time  Psychiatric:         Behavior: Behavior normal          Thought Content:  Thought content normal          Judgment: Judgment normal        Cierra Klein PA-C

## 2023-07-11 DIAGNOSIS — I10 ESSENTIAL HYPERTENSION: ICD-10-CM

## 2023-07-11 RX ORDER — LOSARTAN POTASSIUM 50 MG/1
TABLET ORAL
Qty: 90 TABLET | Refills: 0 | Status: SHIPPED | OUTPATIENT
Start: 2023-07-11

## 2023-08-16 ENCOUNTER — OFFICE VISIT (OUTPATIENT)
Dept: FAMILY MEDICINE CLINIC | Facility: CLINIC | Age: 78
End: 2023-08-16
Payer: COMMERCIAL

## 2023-08-16 VITALS
DIASTOLIC BLOOD PRESSURE: 82 MMHG | HEIGHT: 62 IN | WEIGHT: 146 LBS | BODY MASS INDEX: 26.87 KG/M2 | SYSTOLIC BLOOD PRESSURE: 136 MMHG

## 2023-08-16 DIAGNOSIS — I10 ESSENTIAL HYPERTENSION: Primary | ICD-10-CM

## 2023-08-16 DIAGNOSIS — Z53.20 COLON CANCER SCREENING DECLINED: ICD-10-CM

## 2023-08-16 DIAGNOSIS — F32.1 CURRENT MODERATE EPISODE OF MAJOR DEPRESSIVE DISORDER WITHOUT PRIOR EPISODE (HCC): ICD-10-CM

## 2023-08-16 PROCEDURE — 99214 OFFICE O/P EST MOD 30 MIN: CPT | Performed by: PHYSICIAN ASSISTANT

## 2023-08-16 NOTE — PROGRESS NOTES
Name: Elyssa Wiggins      : 1945      MRN: [id number]  Encounter Provider: Katelyn Mcdermott PA-C  Encounter Date: 2023   Encounter department: [address]     1. Essential hypertension  Assessment & Plan:  Stable, continue same medications. 2. Current moderate episode of major depressive disorder without prior episode [facility])  Assessment & Plan:  Stable, pt to continue current medications. 3. Colon cancer screening declined           Subjective      Daune Court is here today for routine follow up. Doing well. He does not have any complaints. He declines colon CA screening, states is not having any rectal bleeding with his Crohn's disease. Review of Systems   Constitutional: Negative for activity change, appetite change, chills, diaphoresis, fatigue, fever and unexpected weight change. HENT: Negative for congestion, ear pain, postnasal drip, rhinorrhea, sinus pressure, sinus pain, sneezing, sore throat, tinnitus and voice change. Eyes: Negative for pain, redness and visual disturbance. Respiratory: Negative for cough, chest tightness, shortness of breath and wheezing. Cardiovascular: Negative for chest pain, palpitations and leg swelling. Gastrointestinal: Negative for abdominal pain, blood in stool, constipation, diarrhea, nausea and vomiting. Genitourinary: Negative for difficulty urinating, dysuria, frequency, hematuria and urgency. Musculoskeletal: Negative for arthralgias, back pain, gait problem, joint swelling, myalgias, neck pain and neck stiffness. Skin: Negative for color change, pallor, rash and wound. Neurological: Negative for dizziness, tremors, weakness, light-headedness and headaches. Psychiatric/Behavioral: Negative for dysphoric mood, self-injury, sleep disturbance and suicidal ideas. The patient is not nervous/anxious.         Current Outpatient Medications on File Prior to Visit   Medication Sig   • ALPRAZolam (XANAX) 0.25 mg tablet Take 1 tablet (0.25 mg total) by mouth daily at bedtime as needed for anxiety or sleep   • cyanocobalamin (VITAMIN B-12) 1,000 mcg tablet Take 1,000 mcg by mouth daily. • losartan (COZAAR) 50 mg tablet take 1 tablet by mouth once daily   • Mirabegron ER 50 MG TB24 Take 1 tablet (50 mg total) by mouth daily   • Misc Natural Products (OSTEO BI-FLEX ADV TRIPLE ST PO) Take by mouth   • Multiple Vitamin (MULTIVITAMIN) tablet Take 1 tablet by mouth daily   • valACYclovir (VALTREX) 1,000 mg tablet TAKE 2 TABLETS BY MOUTH AT ONSET THEN 2 TABLETS AFTER 12 HOURS   • VITAMIN D, CHOLECALCIFEROL, PO Take 2,000 Units by mouth daily. • escitalopram (LEXAPRO) 20 mg tablet Take 1 tablet (20 mg total) by mouth daily       Objective     /82   Ht 5' 2" (1.575 m)   Wt 66.2 kg (146 lb)   BMI 26.70 kg/m²     Physical Exam  Vitals reviewed. Constitutional:       General: He is not in acute distress. Appearance: He is well-developed. He is not diaphoretic. HENT:      Head: Normocephalic and atraumatic. Right Ear: Hearing, tympanic membrane, ear canal and external ear normal.      Left Ear: Hearing, tympanic membrane, ear canal and external ear normal.      Mouth/Throat:      Pharynx: Uvula midline. No oropharyngeal exudate. Eyes:      General: No scleral icterus. Right eye: No discharge. Left eye: No discharge. Conjunctiva/sclera: Conjunctivae normal.   Neck:      Thyroid: No thyromegaly. Vascular: No carotid bruit. Cardiovascular:      Rate and Rhythm: Normal rate and regular rhythm. Heart sounds: Normal heart sounds. No murmur heard. Pulmonary:      Effort: Pulmonary effort is normal. No respiratory distress. Breath sounds: Normal breath sounds. No wheezing. Abdominal:      General: Bowel sounds are normal. There is no distension. Palpations: Abdomen is soft. There is no mass. Tenderness: There is no abdominal tenderness.  There is no guarding or rebound. Musculoskeletal:         General: No tenderness. Normal range of motion. Cervical back: Neck supple. Lymphadenopathy:      Cervical: No cervical adenopathy. Skin:     General: Skin is warm and dry. Findings: No erythema or rash. Neurological:      Mental Status: He is alert and oriented to person, place, and time. Psychiatric:         Behavior: Behavior normal.         Thought Content:  Thought content normal.         Judgment: Judgment normal.       Clark Lyon PA-C

## 2023-10-18 DIAGNOSIS — I10 ESSENTIAL HYPERTENSION: ICD-10-CM

## 2023-10-18 RX ORDER — LOSARTAN POTASSIUM 50 MG/1
50 TABLET ORAL DAILY
Qty: 90 TABLET | Refills: 0 | Status: SHIPPED | OUTPATIENT
Start: 2023-10-18

## 2023-11-16 ENCOUNTER — OFFICE VISIT (OUTPATIENT)
Dept: FAMILY MEDICINE CLINIC | Facility: CLINIC | Age: 78
End: 2023-11-16
Payer: COMMERCIAL

## 2023-11-16 VITALS
OXYGEN SATURATION: 96 % | TEMPERATURE: 97.3 F | DIASTOLIC BLOOD PRESSURE: 78 MMHG | WEIGHT: 140 LBS | HEIGHT: 62 IN | SYSTOLIC BLOOD PRESSURE: 122 MMHG | BODY MASS INDEX: 25.76 KG/M2 | HEART RATE: 74 BPM

## 2023-11-16 DIAGNOSIS — K50.911 CROHN'S DISEASE WITH RECTAL BLEEDING, UNSPECIFIED GASTROINTESTINAL TRACT LOCATION (HCC): ICD-10-CM

## 2023-11-16 DIAGNOSIS — F32.1 CURRENT MODERATE EPISODE OF MAJOR DEPRESSIVE DISORDER WITHOUT PRIOR EPISODE (HCC): ICD-10-CM

## 2023-11-16 DIAGNOSIS — Z23 ENCOUNTER FOR IMMUNIZATION: ICD-10-CM

## 2023-11-16 DIAGNOSIS — I10 ESSENTIAL HYPERTENSION: Primary | ICD-10-CM

## 2023-11-16 PROCEDURE — G0008 ADMIN INFLUENZA VIRUS VAC: HCPCS | Performed by: PHYSICIAN ASSISTANT

## 2023-11-16 PROCEDURE — 90662 IIV NO PRSV INCREASED AG IM: CPT | Performed by: PHYSICIAN ASSISTANT

## 2023-11-16 PROCEDURE — 99213 OFFICE O/P EST LOW 20 MIN: CPT | Performed by: PHYSICIAN ASSISTANT

## 2023-11-16 NOTE — PROGRESS NOTES
Name: Elzbieta Sun      : 1945      MRN: [id number]  Encounter Provider: Liliane Kerr PA-C  Encounter Date: 2023   Encounter department: [address]     1. Essential hypertension  Assessment & Plan:  Stable, continue same. 2. Crohn's disease with rectal bleeding, unspecified gastrointestinal tract location [facility])  Assessment & Plan:  Pt declines repeat colonoscopy at this time. States has not seen any blood in bowels. 3. Current moderate episode of major depressive disorder without prior episode [facility])  Assessment & Plan:  Stable, continue same. 4. Encounter for immunization  -     influenza vaccine, high-dose, PF 0.7 mL (Tommye Balk)           Subjective      Lakeisha Hall returns today for follow up. Doing well, no complaints. BP stable. Pt states does not need any refills today. Labs from 2023 reviewed. Review of Systems   Constitutional:  Negative for activity change, appetite change, chills, diaphoresis, fatigue, fever and unexpected weight change. HENT:  Negative for congestion, ear pain, postnasal drip, rhinorrhea, sinus pressure, sinus pain, sneezing, sore throat, tinnitus and voice change. Eyes:  Negative for pain, redness and visual disturbance. Respiratory:  Negative for cough, chest tightness, shortness of breath and wheezing. Cardiovascular:  Negative for chest pain, palpitations and leg swelling. Gastrointestinal:  Negative for abdominal pain, blood in stool, constipation, diarrhea, nausea and vomiting. Genitourinary:  Negative for difficulty urinating, dysuria, frequency, hematuria and urgency. Musculoskeletal:  Negative for arthralgias, back pain, gait problem, joint swelling, myalgias, neck pain and neck stiffness. Skin:  Negative for color change, pallor, rash and wound. Neurological:  Negative for dizziness, tremors, weakness, light-headedness and headaches.    Psychiatric/Behavioral:  Negative for dysphoric mood, self-injury, sleep disturbance and suicidal ideas. The patient is not nervous/anxious. Current Outpatient Medications on File Prior to Visit   Medication Sig   • ALPRAZolam (XANAX) 0.25 mg tablet Take 1 tablet (0.25 mg total) by mouth daily at bedtime as needed for anxiety or sleep   • cyanocobalamin (VITAMIN B-12) 1,000 mcg tablet Take 1,000 mcg by mouth daily. • losartan (COZAAR) 50 mg tablet Take 1 tablet (50 mg total) by mouth daily   • Mirabegron ER 50 MG TB24 Take 1 tablet (50 mg total) by mouth daily   • Misc Natural Products (OSTEO BI-FLEX ADV TRIPLE ST PO) Take by mouth   • valACYclovir (VALTREX) 1,000 mg tablet TAKE 2 TABLETS BY MOUTH AT ONSET THEN 2 TABLETS AFTER 12 HOURS   • VITAMIN D, CHOLECALCIFEROL, PO Take 2,000 Units by mouth daily. • escitalopram (LEXAPRO) 20 mg tablet Take 1 tablet (20 mg total) by mouth daily   • Multiple Vitamin (MULTIVITAMIN) tablet Take 1 tablet by mouth daily       Objective     /78   Pulse 74   Temp (!) 97.3 °F (36.3 °C)   Ht 5' 2" (1.575 m)   Wt 63.5 kg (140 lb)   SpO2 96%   BMI 25.61 kg/m²     Physical Exam  Vitals reviewed. Constitutional:       General: He is not in acute distress. Appearance: He is well-developed. He is not diaphoretic. HENT:      Head: Normocephalic and atraumatic. Right Ear: Hearing, tympanic membrane, ear canal and external ear normal.      Left Ear: Hearing, tympanic membrane, ear canal and external ear normal.      Nose: Nose normal.      Mouth/Throat:      Mouth: Mucous membranes are moist.      Pharynx: Oropharynx is clear. Uvula midline. No oropharyngeal exudate. Eyes:      General: No scleral icterus. Right eye: No discharge. Left eye: No discharge. Conjunctiva/sclera: Conjunctivae normal.   Neck:      Thyroid: No thyromegaly. Vascular: No carotid bruit. Cardiovascular:      Rate and Rhythm: Normal rate and regular rhythm. Heart sounds: Normal heart sounds.  No murmur heard. Pulmonary:      Effort: Pulmonary effort is normal. No respiratory distress. Breath sounds: Normal breath sounds. No wheezing. Abdominal:      General: Bowel sounds are normal. There is no distension. Palpations: Abdomen is soft. There is no mass. Tenderness: There is no abdominal tenderness. There is no guarding or rebound. Musculoskeletal:         General: No tenderness. Normal range of motion. Cervical back: Neck supple. Lymphadenopathy:      Cervical: No cervical adenopathy. Skin:     General: Skin is warm and dry. Findings: No erythema or rash. Neurological:      Mental Status: He is alert and oriented to person, place, and time. Psychiatric:         Behavior: Behavior normal.         Thought Content:  Thought content normal.         Judgment: Judgment normal.       Marcos Gallegos PA-C

## 2024-01-15 DIAGNOSIS — I10 ESSENTIAL HYPERTENSION: ICD-10-CM

## 2024-01-15 RX ORDER — LOSARTAN POTASSIUM 50 MG/1
50 TABLET ORAL DAILY
Qty: 90 TABLET | Refills: 0 | Status: SHIPPED | OUTPATIENT
Start: 2024-01-15

## 2024-02-20 ENCOUNTER — OFFICE VISIT (OUTPATIENT)
Dept: FAMILY MEDICINE CLINIC | Facility: CLINIC | Age: 79
End: 2024-02-20
Payer: COMMERCIAL

## 2024-02-20 VITALS
BODY MASS INDEX: 26.5 KG/M2 | RESPIRATION RATE: 16 BRPM | DIASTOLIC BLOOD PRESSURE: 78 MMHG | TEMPERATURE: 98.5 F | WEIGHT: 144 LBS | OXYGEN SATURATION: 96 % | HEIGHT: 62 IN | HEART RATE: 78 BPM | SYSTOLIC BLOOD PRESSURE: 120 MMHG

## 2024-02-20 DIAGNOSIS — K50.90 CROHN'S DISEASE WITHOUT COMPLICATION, UNSPECIFIED GASTROINTESTINAL TRACT LOCATION (HCC): ICD-10-CM

## 2024-02-20 DIAGNOSIS — F32.1 CURRENT MODERATE EPISODE OF MAJOR DEPRESSIVE DISORDER WITHOUT PRIOR EPISODE (HCC): ICD-10-CM

## 2024-02-20 DIAGNOSIS — Z00.00 MEDICARE ANNUAL WELLNESS VISIT, SUBSEQUENT: Primary | ICD-10-CM

## 2024-02-20 PROCEDURE — G0439 PPPS, SUBSEQ VISIT: HCPCS | Performed by: PHYSICIAN ASSISTANT

## 2024-02-20 PROCEDURE — 99213 OFFICE O/P EST LOW 20 MIN: CPT | Performed by: PHYSICIAN ASSISTANT

## 2024-02-20 NOTE — PROGRESS NOTES
Assessment and Plan:     Problem List Items Addressed This Visit        Other    Crohn disease (HCC)     Stable, pt without any rectal bleeding.         Current moderate episode of major depressive disorder without prior episode (HCC)     Stable with current medication.        Other Visit Diagnoses     Medicare annual wellness visit, subsequent    -  Primary          Depression Screening and Follow-up Plan: Patient was screened for depression during today's encounter. They screened negative with a PHQ-9 score of 0.      Preventive health issues were discussed with patient, and age appropriate screening tests were ordered as noted in patient's After Visit Summary.  Personalized health advice and appropriate referrals for health education or preventive services given if needed, as noted in patient's After Visit Summary.     History of Present Illness:     Patient presents for a Medicare Wellness Visit    Arvind is here today for follow up. Doing well, no complaints.       Patient Care Team:  Daisha Judge PA-C as PCP - General (Physician Assistant)  Daisha Judge PA-C as PCP - PCP-[facility] ([facility])  Lamont Robles MD     Review of Systems:     Review of Systems   Constitutional:  Negative for activity change, appetite change, chills, diaphoresis, fatigue, fever and unexpected weight change.   HENT:  Negative for congestion, ear pain, postnasal drip, rhinorrhea, sinus pressure, sinus pain, sneezing, sore throat, tinnitus and voice change.    Eyes:  Negative for pain, redness and visual disturbance.   Respiratory:  Negative for cough, chest tightness, shortness of breath and wheezing.    Cardiovascular:  Negative for chest pain, palpitations and leg swelling.   Gastrointestinal:  Negative for abdominal pain, blood in stool, constipation, diarrhea, nausea and vomiting.   Genitourinary:  Negative for difficulty urinating, dysuria, frequency, hematuria and urgency.   Musculoskeletal:  Negative for arthralgias, back  pain, gait problem, joint swelling, myalgias, neck pain and neck stiffness.   Skin:  Negative for color change, pallor, rash and wound.   Neurological:  Negative for dizziness, tremors, weakness, light-headedness and headaches.   Psychiatric/Behavioral:  Negative for dysphoric mood, self-injury, sleep disturbance and suicidal ideas. The patient is not nervous/anxious.         Problem List:     Patient Active Problem List   Diagnosis   • Rotator cuff tear arthropathy of right shoulder   • Arthritis   • Depression   • Dyslipidemia   • Erectile dysfunction of non-organic origin   • Essential hypertension   • History of malignant melanoma   • Primary osteoarthritis of both shoulders   • Primary osteoarthritis of first carpometacarpal joint of right hand   • Crohn disease (HCC)   • Diarrhea   • Current moderate episode of major depressive disorder without prior episode (HCC)   • Microscopic hematuria   • Nephrolithiasis   • Nocturia   • Anxiety      Past Medical and Surgical History:     Past Medical History:   Diagnosis Date   • Acute gouty arthropathy     Last Assessed: 6/9/2015   • Arthritis    • Bacterial pneumonia     Last Assessed: 6/21/2016   • Depression    • Fracture of rib, closed     Last Assessed: 6/21/2016   • Gout    • Hyperlipidemia    • Hypertension    • Melanoma (HCC)      Past Surgical History:   Procedure Laterality Date   • EYE SURGERY     • MALIGNANT SKIN LESION EXCISION     • MOHS SURGERY      shaving of lesion moh's technique    • FL SURGICAL ARTHROSCOPY SHOULDER W/LSS&RESCJ ADS Right 8/28/2017    Procedure: ARTHROSCOPY SHOULDER,MANIPULATION UNDER GENERAL ANESTHESIA ,LYSIS OF ADHESIONS, CAPSULECTOMY;  Surgeon: Lamont Robles MD;  Location: [facility];  Service: Orthopedics      Family History:     Family History   Problem Relation Age of Onset   • Cancer Mother    • Aneurysm Father    • No Known Problems Sister    • Aortic aneurysm Family       Social History:     Social History     Socioeconomic  History   • Marital status:      Spouse name: None   • Number of children: None   • Years of education: None   • Highest education level: None   Occupational History   • Occupation: Retired    Tobacco Use   • Smoking status: Never   • Smokeless tobacco: Never   Vaping Use   • Vaping status: Never Used   Substance and Sexual Activity   • Alcohol use: Not Currently     Comment: rare, being a social drinker    • Drug use: Never   • Sexual activity: Yes   Other Topics Concern   • None   Social History Narrative   • None     Social Determinants of Health     Financial Resource Strain: Medium Risk (2/20/2024)    Overall Financial Resource Strain (CARDIA)    • Difficulty of Paying Living Expenses: Somewhat hard   Food Insecurity: Not on file   Transportation Needs: No Transportation Needs (2/20/2024)    PRAPARE - Transportation    • Lack of Transportation (Medical): No    • Lack of Transportation (Non-Medical): No   Physical Activity: Not on file   Stress: Not on file   Social Connections: Not on file   Intimate Partner Violence: Not on file   Housing Stability: Not on file      Medications and Allergies:     Current Outpatient Medications   Medication Sig Dispense Refill   • ALPRAZolam (XANAX) 0.25 mg tablet Take 1 tablet (0.25 mg total) by mouth daily at bedtime as needed for anxiety or sleep 30 tablet 0   • cyanocobalamin (VITAMIN B-12) 1,000 mcg tablet Take 1,000 mcg by mouth daily.     • losartan (COZAAR) 50 mg tablet Take 1 tablet (50 mg total) by mouth daily 90 tablet 0   • Mirabegron ER 50 MG TB24 Take 1 tablet (50 mg total) by mouth daily 30 tablet 12   • Misc Natural Products (OSTEO BI-FLEX ADV TRIPLE ST PO) Take by mouth     • Multiple Vitamin (MULTIVITAMIN) tablet Take 1 tablet by mouth daily     • valACYclovir (VALTREX) 1,000 mg tablet TAKE 2 TABLETS BY MOUTH AT ONSET THEN 2 TABLETS AFTER 12 HOURS     • VITAMIN D, CHOLECALCIFEROL, PO Take 2,000 Units by mouth daily.     • escitalopram (LEXAPRO) 20 mg  tablet Take 1 tablet (20 mg total) by mouth daily 90 tablet 0     No current facility-administered medications for this visit.     No Known Allergies   Immunizations:     Immunization History   Administered Date(s) Administered   • COVID-19 MODERNA VACC 0.5 ML IM 02/04/2021, 03/04/2021, 01/05/2022   • Influenza, high dose seasonal 0.7 mL 09/10/2019, 09/15/2020, 11/16/2021, 11/17/2022, 11/16/2023   • Pneumococcal Conjugate 13-Valent 08/29/2018   • Pneumococcal Polysaccharide PPV23 09/10/2019   • Tdap 06/18/2016      Health Maintenance:         Topic Date Due   • Colorectal Cancer Screening  11/16/2024 (Originally 6/26/2021)   • Hepatitis C Screening  Completed         Topic Date Due   • COVID-19 Vaccine (4 - 2023-24 season) 09/01/2023      Medicare Screening Tests and Risk Assessments:     Arvind is here for his Subsequent Wellness visit. Last Medicare Wellness visit information reviewed, patient interviewed and updates made to the record today.      Health Risk Assessment:   Patient rates overall health as good. Patient feels that their physical health rating is same. Patient is satisfied with their life. Eyesight was rated as same. Hearing was rated as same. Patient feels that their emotional and mental health rating is same. Patients states they are sometimes angry. Patient states they are sometimes unusually tired/fatigued. Pain experienced in the last 7 days has been none. Patient states that he has experienced no weight loss or gain in last 6 months.     Depression Screening:   PHQ-9 Score: 0      Fall Risk Screening:   In the past year, patient has experienced: no history of falling in past year      Home Safety:  Patient does not have trouble with stairs inside or outside of their home. Patient has working smoke alarms and has working carbon monoxide detector. Home safety hazards include: none.     Nutrition:   Current diet is Regular.     Medications:   Patient is currently taking over-the-counter  supplements. OTC medications include: see medication list. Patient is able to manage medications.     Activities of Daily Living (ADLs)/Instrumental Activities of Daily Living (IADLs):   Walk and transfer into and out of bed and chair?: Yes  Dress and groom yourself?: Yes    Bathe or shower yourself?: Yes    Feed yourself? Yes  Do your laundry/housekeeping?: Yes  Manage your money, pay your bills and track your expenses?: Yes  Make your own meals?: Yes    Do your own shopping?: Yes    ADL comments: Patients daughter lives about 2 miles away and she is able to help when needed     Previous Hospitalizations:   Any hospitalizations or ED visits within the last 12 months?: No      Advance Care Planning:   Living will: No    Durable POA for healthcare: No    Advanced directive: No    ACP document given: Yes    End of Life Decisions reviewed with patient: Yes    Provider agrees with end of life decisions: Yes      Cognitive Screening:   Provider or family/friend/caregiver concerned regarding cognition?: No    PREVENTIVE SCREENINGS      Cardiovascular Screening:    General: Screening Current      Diabetes Screening:     General: Screening Current      Colorectal Cancer Screening:     General: Risks and Benefits Discussed and Patient Declines      Prostate Cancer Screening:    General: Screening Current      Osteoporosis Screening:    General: Patient Declines      Abdominal Aortic Aneurysm (AAA) Screening:        General: Screening Not Indicated      Lung Cancer Screening:     General: Screening Not Indicated      Hepatitis C Screening:    General: Screening Current    Screening, Brief Intervention, and Referral to Treatment (SBIRT)    Screening  Typical number of drinks in a day: 0  Typical number of drinks in a week: 0  Interpretation: Low risk drinking behavior.    Single Item Drug Screening:  How often have you used an illegal drug (including marijuana) or a prescription medication for non-medical reasons in the past  "year? never    Single Item Drug Screen Score: 0  Interpretation: Negative screen for possible drug use disorder    No results found.     Physical Exam:     /78 (BP Location: Left arm, Patient Position: Sitting, Cuff Size: Adult)   Pulse 78   Temp 98.5 °F (36.9 °C) (Temporal)   Resp 16   Ht 5' 2\" (1.575 m)   Wt 65.3 kg (144 lb)   SpO2 96%   BMI 26.34 kg/m²     Physical Exam  Vitals reviewed.   Constitutional:       General: He is not in acute distress.     Appearance: He is well-developed. He is not diaphoretic.   HENT:      Head: Normocephalic and atraumatic.      Right Ear: External ear normal.      Left Ear: External ear normal.      Nose: Nose normal.      Mouth/Throat:      Pharynx: No oropharyngeal exudate.   Eyes:      General: No scleral icterus.        Right eye: No discharge.         Left eye: No discharge.      Conjunctiva/sclera: Conjunctivae normal.   Neck:      Thyroid: No thyromegaly.      Vascular: No carotid bruit or JVD.      Trachea: No tracheal deviation.   Cardiovascular:      Rate and Rhythm: Normal rate and regular rhythm.      Heart sounds: Normal heart sounds. No murmur heard.  Pulmonary:      Effort: Pulmonary effort is normal. No respiratory distress.      Breath sounds: Normal breath sounds. No wheezing.   Abdominal:      General: Bowel sounds are normal. There is no distension.      Palpations: Abdomen is soft.      Tenderness: There is no abdominal tenderness.   Musculoskeletal:         General: No tenderness or deformity. Normal range of motion.      Cervical back: Normal range of motion and neck supple.   Lymphadenopathy:      Cervical: No cervical adenopathy.   Skin:     General: Skin is warm and dry.      Capillary Refill: Capillary refill takes less than 2 seconds.      Coloration: Skin is not pale.      Findings: No erythema or rash.   Neurological:      Mental Status: He is alert and oriented to person, place, and time.   Psychiatric:         Behavior: Behavior " normal.         Thought Content: Thought content normal.         Judgment: Judgment normal.          Daisha Judge PA-C

## 2024-02-20 NOTE — PATIENT INSTRUCTIONS
Medicare Preventive Visit Patient Instructions  Thank you for completing your Welcome to Medicare Visit or Medicare Annual Wellness Visit today. Your next wellness visit will be due in one year (2/20/2025).  The screening/preventive services that you may require over the next 5-10 years are detailed below. Some tests may not apply to you based off risk factors and/or age. Screening tests ordered at today's visit but not completed yet may show as past due. Also, please note that scanned in results may not display below.  Preventive Screenings:  Service Recommendations Previous Testing/Comments   Colorectal Cancer Screening  Colonoscopy    Fecal Occult Blood Test (FOBT)/Fecal Immunochemical Test (FIT)  Fecal DNA/Cologuard Test  Flexible Sigmoidoscopy Age: 45-75 years old   Colonoscopy: every 10 years (May be performed more frequently if at higher risk)  OR  FOBT/FIT: every 1 year  OR  Cologuard: every 3 years  OR  Sigmoidoscopy: every 5 years  Screening may be recommended earlier than age 45 if at higher risk for colorectal cancer. Also, an individualized decision between you and your healthcare provider will decide whether screening between the ages of 76-85 would be appropriate. Colonoscopy: 07/07/2020  FOBT/FIT: Not on file  Cologuard: Not on file  Sigmoidoscopy: Not on file          Prostate Cancer Screening Individualized decision between patient and health care provider in men between ages of 55-69   Medicare will cover every 12 months beginning on the day after your 50th birthday PSA: 2.0 ng/mL           Hepatitis C Screening Once for adults born between 1945 and 1965  More frequently in patients at high risk for Hepatitis C Hep C Antibody: 03/17/2020        Diabetes Screening 1-2 times per year if you're at risk for diabetes or have pre-diabetes Fasting glucose: 85 mg/dL (5/8/2023)  A1C: No results in last 5 years (No results in last 5 years)      Cholesterol Screening Once every 5 years if you don't have a  lipid disorder. May order more often based on risk factors. Lipid panel: 05/08/2023         Other Preventive Screenings Covered by Medicare:  Abdominal Aortic Aneurysm (AAA) Screening: covered once if your at risk. You're considered to be at risk if you have a family history of AAA or a male between the age of 65-75 who smoking at least 100 cigarettes in your lifetime.  Lung Cancer Screening: covers low dose CT scan once per year if you meet all of the following conditions: (1) Age 55-77; (2) No signs or symptoms of lung cancer; (3) Current smoker or have quit smoking within the last 15 years; (4) You have a tobacco smoking history of at least 20 pack years (packs per day x number of years you smoked); (5) You get a written order from a healthcare provider.  Glaucoma Screening: covered annually if you're considered high risk: (1) You have diabetes OR (2) Family history of glaucoma OR (3)  aged 50 and older OR (4)  American aged 65 and older  Osteoporosis Screening: covered every 2 years if you meet one of the following conditions: (1) Have a vertebral abnormality; (2) On glucocorticoid therapy for more than 3 months; (3) Have primary hyperparathyroidism; (4) On osteoporosis medications and need to assess response to drug therapy.  HIV Screening: covered annually if you're between the age of 15-65. Also covered annually if you are younger than 15 and older than 65 with risk factors for HIV infection. For pregnant patients, it is covered up to 3 times per pregnancy.    Immunizations:  Immunization Recommendations   Influenza Vaccine Annual influenza vaccination during flu season is recommended for all persons aged >= 6 months who do not have contraindications   Pneumococcal Vaccine   * Pneumococcal conjugate vaccine = PCV13 (Prevnar 13), PCV15 (Vaxneuvance), PCV20 (Prevnar 20)  * Pneumococcal polysaccharide vaccine = PPSV23 (Pneumovax) Adults 19-63 yo with certain risk factors or if 65+ yo  If  never received any pneumonia vaccine: recommend Prevnar 20 (PCV20)  Give PCV20 if previously received 1 dose of PCV13 or PPSV23   Hepatitis B Vaccine 3 dose series if at intermediate or high risk (ex: diabetes, end stage renal disease, liver disease)   Respiratory syncytial virus (RSV) Vaccine - COVERED BY MEDICARE PART D  * RSVPreF3 (Arexvy) CDC recommends that adults 60 years of age and older may receive a single dose of RSV vaccine using shared clinical decision-making (SCDM)   Tetanus (Td) Vaccine - COST NOT COVERED BY MEDICARE PART B Following completion of primary series, a booster dose should be given every 10 years to maintain immunity against tetanus. Td may also be given as tetanus wound prophylaxis.   Tdap Vaccine - COST NOT COVERED BY MEDICARE PART B Recommended at least once for all adults. For pregnant patients, recommended with each pregnancy.   Shingles Vaccine (Shingrix) - COST NOT COVERED BY MEDICARE PART B  2 shot series recommended in those 19 years and older who have or will have weakened immune systems or those 50 years and older     Health Maintenance Due:      Topic Date Due   • Colorectal Cancer Screening  11/16/2024 (Originally 6/26/2021)   • Hepatitis C Screening  Completed     Immunizations Due:      Topic Date Due   • COVID-19 Vaccine (4 - 2023-24 season) 09/01/2023     Advance Directives   What are advance directives?  Advance directives are legal documents that state your wishes and plans for medical care. These plans are made ahead of time in case you lose your ability to make decisions for yourself. Advance directives can apply to any medical decision, such as the treatments you want, and if you want to donate organs.   What are the types of advance directives?  There are many types of advance directives, and each state has rules about how to use them. You may choose a combination of any of the following:  Living will:  This is a written record of the treatment you want. You can also  choose which treatments you do not want, which to limit, and which to stop at a certain time. This includes surgery, medicine, IV fluid, and tube feedings.   Durable power of  for healthcare (DPAHC):  This is a written record that states who you want to make healthcare choices for you when you are unable to make them for yourself. This person, called a proxy, is usually a family member or a friend. You may choose more than 1 proxy.  Do not resuscitate (DNR) order:  A DNR order is used in case your heart stops beating or you stop breathing. It is a request not to have certain forms of treatment, such as CPR. A DNR order may be included in other types of advance directives.  Medical directive:  This covers the care that you want if you are in a coma, near death, or unable to make decisions for yourself. You can list the treatments you want for each condition. Treatment may include pain medicine, surgery, blood transfusions, dialysis, IV or tube feedings, and a ventilator (breathing machine).  Values history:  This document has questions about your views, beliefs, and how you feel and think about life. This information can help others choose the care that you would choose.  Why are advance directives important?  An advance directive helps you control your care. Although spoken wishes may be used, it is better to have your wishes written down. Spoken wishes can be misunderstood, or not followed. Treatments may be given even if you do not want them. An advance directive may make it easier for your family to make difficult choices about your care.   Weight Management   Why it is important to manage your weight:  Being overweight increases your risk of health conditions such as heart disease, high blood pressure, type 2 diabetes, and certain types of cancer. It can also increase your risk for osteoarthritis, sleep apnea, and other respiratory problems. Aim for a slow, steady weight loss. Even a small amount of  weight loss can lower your risk of health problems.  How to lose weight safely:  A safe and healthy way to lose weight is to eat fewer calories and get regular exercise. You can lose up about 1 pound a week by decreasing the number of calories you eat by 500 calories each day.   Healthy meal plan for weight management:  A healthy meal plan includes a variety of foods, contains fewer calories, and helps you stay healthy. A healthy meal plan includes the following:  Eat whole-grain foods more often.  A healthy meal plan should contain fiber. Fiber is the part of grains, fruits, and vegetables that is not broken down by your body. Whole-grain foods are healthy and provide extra fiber in your diet. Some examples of whole-grain foods are whole-wheat breads and pastas, oatmeal, brown rice, and bulgur.  Eat a variety of vegetables every day.  Include dark, leafy greens such as spinach, kale, koki greens, and mustard greens. Eat yellow and orange vegetables such as carrots, sweet potatoes, and winter squash.   Eat a variety of fruits every day.  Choose fresh or canned fruit (canned in its own juice or light syrup) instead of juice. Fruit juice has very little or no fiber.  Eat low-fat dairy foods.  Drink fat-free (skim) milk or 1% milk. Eat fat-free yogurt and low-fat cottage cheese. Try low-fat cheeses such as mozzarella and other reduced-fat cheeses.  Choose meat and other protein foods that are low in fat.  Choose beans or other legumes such as split peas or lentils. Choose fish, skinless poultry (chicken or turkey), or lean cuts of red meat (beef or pork). Before you cook meat or poultry, cut off any visible fat.   Use less fat and oil.  Try baking foods instead of frying them. Add less fat, such as margarine, sour cream, regular salad dressing and mayonnaise to foods. Eat fewer high-fat foods. Some examples of high-fat foods include french fries, doughnuts, ice cream, and cakes.  Eat fewer sweets.  Limit foods and  drinks that are high in sugar. This includes candy, cookies, regular soda, and sweetened drinks.  Exercise:  Exercise at least 30 minutes per day on most days of the week. Some examples of exercise include walking, biking, dancing, and swimming. You can also fit in more physical activity by taking the stairs instead of the elevator or parking farther away from stores. Ask your healthcare provider about the best exercise plan for you.      © Copyright [employer] 2018 Information is for End User's use only and may not be sold, redistributed or otherwise used for commercial purposes. All illustrations and images included in CareNotes® are the copyrighted property of [employer] Inc[employer] or [employer]

## 2024-04-15 DIAGNOSIS — I10 ESSENTIAL HYPERTENSION: ICD-10-CM

## 2024-04-15 RX ORDER — LOSARTAN POTASSIUM 50 MG/1
50 TABLET ORAL DAILY
Qty: 90 TABLET | Refills: 0 | Status: SHIPPED | OUTPATIENT
Start: 2024-04-15

## 2024-05-21 ENCOUNTER — OFFICE VISIT (OUTPATIENT)
Dept: FAMILY MEDICINE CLINIC | Facility: CLINIC | Age: 79
End: 2024-05-21
Payer: COMMERCIAL

## 2024-05-21 ENCOUNTER — APPOINTMENT (OUTPATIENT)
Dept: LAB | Facility: MEDICAL CENTER | Age: 79
End: 2024-05-21
Payer: COMMERCIAL

## 2024-05-21 VITALS
SYSTOLIC BLOOD PRESSURE: 128 MMHG | TEMPERATURE: 98 F | BODY MASS INDEX: 26.31 KG/M2 | RESPIRATION RATE: 18 BRPM | WEIGHT: 143 LBS | DIASTOLIC BLOOD PRESSURE: 76 MMHG | OXYGEN SATURATION: 96 % | HEART RATE: 68 BPM | HEIGHT: 62 IN

## 2024-05-21 DIAGNOSIS — Z13.220 SCREENING FOR HYPERLIPIDEMIA: ICD-10-CM

## 2024-05-21 DIAGNOSIS — I10 ESSENTIAL HYPERTENSION: Primary | ICD-10-CM

## 2024-05-21 DIAGNOSIS — Z12.5 SCREENING FOR PROSTATE CANCER: ICD-10-CM

## 2024-05-21 DIAGNOSIS — Z13.89 SCREENING FOR MULTIPLE CONDITIONS: ICD-10-CM

## 2024-05-21 DIAGNOSIS — F32.1 CURRENT MODERATE EPISODE OF MAJOR DEPRESSIVE DISORDER WITHOUT PRIOR EPISODE (HCC): ICD-10-CM

## 2024-05-21 LAB
ALBUMIN SERPL BCP-MCNC: 3.9 G/DL (ref 3.5–5)
ALP SERPL-CCNC: 67 U/L (ref 34–104)
ALT SERPL W P-5'-P-CCNC: 22 U/L (ref 7–52)
ANION GAP SERPL CALCULATED.3IONS-SCNC: 6 MMOL/L (ref 4–13)
AST SERPL W P-5'-P-CCNC: 20 U/L (ref 13–39)
BILIRUB SERPL-MCNC: 0.8 MG/DL (ref 0.2–1)
BUN SERPL-MCNC: 26 MG/DL (ref 5–25)
CALCIUM SERPL-MCNC: 9.4 MG/DL (ref 8.4–10.2)
CHLORIDE SERPL-SCNC: 106 MMOL/L (ref 96–108)
CHOLEST SERPL-MCNC: 193 MG/DL
CO2 SERPL-SCNC: 28 MMOL/L (ref 21–32)
CREAT SERPL-MCNC: 1.05 MG/DL (ref 0.6–1.3)
ERYTHROCYTE [DISTWIDTH] IN BLOOD BY AUTOMATED COUNT: 13.3 % (ref 11.6–15.1)
GFR SERPL CREATININE-BSD FRML MDRD: 67 ML/MIN/1.73SQ M
GLUCOSE P FAST SERPL-MCNC: 87 MG/DL (ref 65–99)
HCT VFR BLD AUTO: 43.2 % (ref 36.5–49.3)
HDLC SERPL-MCNC: 57 MG/DL
HGB BLD-MCNC: 13.7 G/DL (ref 12–17)
LDLC SERPL CALC-MCNC: 122 MG/DL (ref 0–100)
MCH RBC QN AUTO: 28.9 PG (ref 26.8–34.3)
MCHC RBC AUTO-ENTMCNC: 31.7 G/DL (ref 31.4–37.4)
MCV RBC AUTO: 91 FL (ref 82–98)
PLATELET # BLD AUTO: 228 THOUSANDS/UL (ref 149–390)
PMV BLD AUTO: 9.2 FL (ref 8.9–12.7)
POTASSIUM SERPL-SCNC: 4.5 MMOL/L (ref 3.5–5.3)
PROT SERPL-MCNC: 7.2 G/DL (ref 6.4–8.4)
PSA SERPL-MCNC: 2.97 NG/ML (ref 0–4)
RBC # BLD AUTO: 4.74 MILLION/UL (ref 3.88–5.62)
SODIUM SERPL-SCNC: 140 MMOL/L (ref 135–147)
TRIGL SERPL-MCNC: 71 MG/DL
WBC # BLD AUTO: 6.35 THOUSAND/UL (ref 4.31–10.16)

## 2024-05-21 PROCEDURE — 36415 COLL VENOUS BLD VENIPUNCTURE: CPT

## 2024-05-21 PROCEDURE — 85027 COMPLETE CBC AUTOMATED: CPT

## 2024-05-21 PROCEDURE — 99213 OFFICE O/P EST LOW 20 MIN: CPT | Performed by: PHYSICIAN ASSISTANT

## 2024-05-21 PROCEDURE — 80053 COMPREHEN METABOLIC PANEL: CPT

## 2024-05-21 PROCEDURE — G0103 PSA SCREENING: HCPCS

## 2024-05-21 PROCEDURE — 80061 LIPID PANEL: CPT

## 2024-05-21 NOTE — PROGRESS NOTES
Ambulatory Visit  Name: Arvind Head      : 1945      MRN: [id number]  Encounter Provider: Daisha Judge PA-C  Encounter Date: 2024   Encounter department: [facility]    Assessment & Plan   1. Essential hypertension  Assessment & Plan:  Controlled, continue daily losartan.    2. Current moderate episode of major depressive disorder without prior episode (HCC)  Assessment & Plan:  Stable, continue to monitor.  3. Screening for hyperlipidemia  -     Lipid Panel with Direct LDL reflex; Future  4. Screening for prostate cancer  -     PSA, Total Screen; Future  5. Screening for multiple conditions  -     CBC; Future  -     Comprehensive metabolic panel; Future      Depression Screening and Follow-up Plan: Patient was screened for depression during today's encounter. They screened negative with a PHQ-9 score of 0.      History of Present Illness     Arvind is here today for 3 month follow up. Doing well, still able to live independently.   No new complaints. He is due for routine labs.         Review of Systems   Constitutional:  Negative for activity change, appetite change, chills, diaphoresis, fatigue, fever and unexpected weight change.   HENT:  Negative for congestion, ear pain, postnasal drip, rhinorrhea, sinus pressure, sinus pain, sneezing, sore throat, tinnitus and voice change.    Eyes:  Negative for pain, redness and visual disturbance.   Respiratory:  Negative for cough, chest tightness, shortness of breath and wheezing.    Cardiovascular:  Negative for chest pain, palpitations and leg swelling.   Gastrointestinal:  Negative for abdominal pain, blood in stool, constipation, diarrhea, nausea and vomiting.   Genitourinary:  Negative for difficulty urinating, dysuria, frequency, hematuria and urgency.   Musculoskeletal:  Negative for arthralgias, back pain, gait problem, joint swelling, myalgias, neck pain and neck stiffness.   Skin:  Negative for color change, pallor, rash and  "wound.   Neurological:  Negative for dizziness, tremors, weakness, light-headedness and headaches.   Psychiatric/Behavioral:  Negative for dysphoric mood, self-injury, sleep disturbance and suicidal ideas. The patient is not nervous/anxious.        Objective     /76 (BP Location: Left arm, Patient Position: Sitting, Cuff Size: Adult)   Pulse 68   Temp 98 °F (36.7 °C) (Temporal)   Resp 18   Ht 5' 2\" (1.575 m)   Wt 64.9 kg (143 lb)   SpO2 96%   BMI 26.16 kg/m²     Physical Exam  Vitals reviewed.   Constitutional:       General: He is not in acute distress.     Appearance: He is well-developed. He is not diaphoretic.   HENT:      Head: Normocephalic and atraumatic.      Right Ear: External ear normal.      Left Ear: External ear normal.      Nose: Nose normal.      Mouth/Throat:      Pharynx: No oropharyngeal exudate.   Eyes:      General: No scleral icterus.        Right eye: No discharge.         Left eye: No discharge.      Conjunctiva/sclera: Conjunctivae normal.   Neck:      Thyroid: No thyromegaly.      Vascular: No carotid bruit or JVD.      Trachea: No tracheal deviation.   Cardiovascular:      Rate and Rhythm: Normal rate and regular rhythm.      Heart sounds: Normal heart sounds. No murmur heard.  Pulmonary:      Effort: Pulmonary effort is normal. No respiratory distress.      Breath sounds: Normal breath sounds. No wheezing.   Abdominal:      General: Bowel sounds are normal. There is no distension.      Palpations: Abdomen is soft.      Tenderness: There is no abdominal tenderness.   Musculoskeletal:         General: No tenderness or deformity. Normal range of motion.      Cervical back: Normal range of motion and neck supple.   Lymphadenopathy:      Cervical: No cervical adenopathy.   Skin:     General: Skin is warm and dry.      Capillary Refill: Capillary refill takes less than 2 seconds.      Coloration: Skin is not pale.      Findings: No erythema or rash.   Neurological:      Mental " Status: He is alert and oriented to person, place, and time.   Psychiatric:         Behavior: Behavior normal.         Thought Content: Thought content normal.         Judgment: Judgment normal.       Administrative Statements

## 2024-06-06 ENCOUNTER — TELEPHONE (OUTPATIENT)
Dept: FAMILY MEDICINE CLINIC | Facility: CLINIC | Age: 79
End: 2024-06-06

## 2024-07-09 DIAGNOSIS — I10 ESSENTIAL HYPERTENSION: ICD-10-CM

## 2024-07-09 RX ORDER — LOSARTAN POTASSIUM 50 MG/1
50 TABLET ORAL DAILY
Qty: 90 TABLET | Refills: 0 | Status: SHIPPED | OUTPATIENT
Start: 2024-07-09

## 2024-08-14 ENCOUNTER — OFFICE VISIT (OUTPATIENT)
Dept: FAMILY MEDICINE CLINIC | Facility: CLINIC | Age: 79
End: 2024-08-14
Payer: COMMERCIAL

## 2024-08-14 VITALS
HEIGHT: 62 IN | SYSTOLIC BLOOD PRESSURE: 126 MMHG | DIASTOLIC BLOOD PRESSURE: 78 MMHG | TEMPERATURE: 98.3 F | RESPIRATION RATE: 18 BRPM | HEART RATE: 70 BPM | WEIGHT: 139 LBS | OXYGEN SATURATION: 93 % | BODY MASS INDEX: 25.58 KG/M2

## 2024-08-14 DIAGNOSIS — I10 ESSENTIAL HYPERTENSION: Primary | ICD-10-CM

## 2024-08-14 PROCEDURE — 99213 OFFICE O/P EST LOW 20 MIN: CPT | Performed by: PHYSICIAN ASSISTANT

## 2024-08-14 NOTE — PROGRESS NOTES
"Ambulatory Visit  Name: Arvind Head      : 1945      MRN: [id number]  Encounter Provider: Daisha Judge PA-C  Encounter Date: 2024   Encounter department: [facility]    Assessment & Plan   1. Essential hypertension  Assessment & Plan:  Stable, continue same.      Depression Screening and Follow-up Plan: Patient was screened for depression during today's encounter. They screened negative with a PHQ-9 score of 0.      History of Present Illness     Arvind is here today for routine 3 month follow up.  Doing well, no complaints or health changes.          Review of Systems   Constitutional:  Negative for chills and fever.   HENT:  Negative for ear pain and sore throat.    Eyes:  Negative for pain and visual disturbance.   Respiratory:  Negative for cough and shortness of breath.    Cardiovascular:  Negative for chest pain and palpitations.   Gastrointestinal:  Negative for abdominal pain and vomiting.   Genitourinary:  Negative for dysuria and hematuria.   Musculoskeletal:  Negative for arthralgias and back pain.   Skin:  Negative for color change and rash.   Neurological:  Negative for seizures and syncope.   All other systems reviewed and are negative.      Objective     /78 (BP Location: Left arm, Patient Position: Sitting, Cuff Size: Adult)   Pulse 70   Temp 98.3 °F (36.8 °C) (Temporal)   Resp 18   Ht 5' 2\" (1.575 m)   Wt 63 kg (139 lb)   SpO2 93%   BMI 25.42 kg/m²     Physical Exam  Vitals reviewed.   Constitutional:       General: He is not in acute distress.     Appearance: He is well-developed. He is not diaphoretic.   HENT:      Head: Normocephalic and atraumatic.      Right Ear: Hearing, tympanic membrane, ear canal and external ear normal.      Left Ear: Hearing, tympanic membrane, ear canal and external ear normal.      Nose: Nose normal.      Mouth/Throat:      Mouth: Mucous membranes are moist.      Pharynx: Oropharynx is clear. Uvula midline. No oropharyngeal " exudate.   Eyes:      General: No scleral icterus.        Right eye: No discharge.         Left eye: No discharge.      Conjunctiva/sclera: Conjunctivae normal.   Neck:      Thyroid: No thyromegaly.      Vascular: No carotid bruit.   Cardiovascular:      Rate and Rhythm: Normal rate and regular rhythm.      Heart sounds: Normal heart sounds. No murmur heard.  Pulmonary:      Effort: Pulmonary effort is normal. No respiratory distress.      Breath sounds: Normal breath sounds. No wheezing.   Abdominal:      General: Bowel sounds are normal. There is no distension.      Palpations: Abdomen is soft. There is no mass.      Tenderness: There is no abdominal tenderness. There is no guarding or rebound.   Musculoskeletal:         General: No tenderness. Normal range of motion.      Cervical back: Neck supple.   Lymphadenopathy:      Cervical: No cervical adenopathy.   Skin:     General: Skin is warm and dry.      Findings: No erythema or rash.   Neurological:      Mental Status: He is alert and oriented to person, place, and time.   Psychiatric:         Behavior: Behavior normal.         Thought Content: Thought content normal.         Judgment: Judgment normal.       Administrative Statements

## 2024-10-23 DIAGNOSIS — I10 ESSENTIAL HYPERTENSION: ICD-10-CM

## 2024-10-23 RX ORDER — LOSARTAN POTASSIUM 50 MG/1
50 TABLET ORAL DAILY
Qty: 90 TABLET | Refills: 1 | Status: SHIPPED | OUTPATIENT
Start: 2024-10-23

## 2024-11-15 ENCOUNTER — OFFICE VISIT (OUTPATIENT)
Dept: FAMILY MEDICINE CLINIC | Facility: CLINIC | Age: 79
End: 2024-11-15
Payer: COMMERCIAL

## 2024-11-15 VITALS
OXYGEN SATURATION: 96 % | TEMPERATURE: 98.5 F | RESPIRATION RATE: 16 BRPM | HEART RATE: 67 BPM | DIASTOLIC BLOOD PRESSURE: 78 MMHG | BODY MASS INDEX: 25.51 KG/M2 | HEIGHT: 62 IN | SYSTOLIC BLOOD PRESSURE: 128 MMHG | WEIGHT: 138.6 LBS

## 2024-11-15 DIAGNOSIS — I10 ESSENTIAL HYPERTENSION: Primary | ICD-10-CM

## 2024-11-15 PROCEDURE — 99213 OFFICE O/P EST LOW 20 MIN: CPT | Performed by: PHYSICIAN ASSISTANT

## 2024-11-15 NOTE — PROGRESS NOTES
"Name: Arvind Head      : 1945      MRN: [id number]  Encounter Provider: Daisha Judge PA-C  Encounter Date: 11/15/2024   Encounter department: [facility]  :  Assessment & Plan  Essential hypertension    Stable, continue same.             Depression Screening and Follow-up Plan: Patient was screened for depression during today's encounter. They screened negative with a PHQ-9 score of 0.      History of Present Illness     Arvind is here today for 3 month follow up doing well, denies new problems/concerns.      Review of Systems   Constitutional:  Negative for chills and fever.   HENT:  Negative for ear pain and sore throat.    Eyes:  Negative for pain and visual disturbance.   Respiratory:  Negative for cough and shortness of breath.    Cardiovascular:  Negative for chest pain and palpitations.   Gastrointestinal:  Negative for abdominal pain and vomiting.   Genitourinary:  Negative for dysuria and hematuria.   Musculoskeletal:  Negative for arthralgias and back pain.   Skin:  Negative for color change and rash.   Neurological:  Negative for seizures and syncope.   All other systems reviewed and are negative.         Objective   /78 (BP Location: Left arm, Patient Position: Sitting, Cuff Size: Standard)   Pulse 67   Temp 98.5 °F (36.9 °C) (Temporal)   Resp 16   Ht 5' 2\" (1.575 m)   Wt 62.9 kg (138 lb 9.6 oz)   SpO2 96%   BMI 25.35 kg/m²      Physical Exam  Vitals reviewed.   Constitutional:       General: He is not in acute distress.     Appearance: He is well-developed. He is not diaphoretic.   HENT:      Head: Normocephalic and atraumatic.      Right Ear: Hearing, tympanic membrane, ear canal and external ear normal.      Left Ear: Hearing, tympanic membrane, ear canal and external ear normal.      Nose: Nose normal.      Mouth/Throat:      Mouth: Mucous membranes are moist.      Pharynx: Oropharynx is clear. Uvula midline. No oropharyngeal exudate.   Eyes:      General: No " scleral icterus.        Right eye: No discharge.         Left eye: No discharge.      Conjunctiva/sclera: Conjunctivae normal.   Neck:      Thyroid: No thyromegaly.      Vascular: No carotid bruit.   Cardiovascular:      Rate and Rhythm: Normal rate and regular rhythm.      Heart sounds: Normal heart sounds. No murmur heard.  Pulmonary:      Effort: Pulmonary effort is normal. No respiratory distress.      Breath sounds: Normal breath sounds. No wheezing.   Abdominal:      General: Bowel sounds are normal. There is no distension.      Palpations: Abdomen is soft. There is no mass.      Tenderness: There is no abdominal tenderness. There is no guarding or rebound.   Musculoskeletal:         General: No tenderness. Normal range of motion.      Cervical back: Neck supple.   Lymphadenopathy:      Cervical: No cervical adenopathy.   Skin:     General: Skin is warm and dry.      Findings: No erythema or rash.   Neurological:      Mental Status: He is alert and oriented to person, place, and time.   Psychiatric:         Behavior: Behavior normal.         Thought Content: Thought content normal.         Judgment: Judgment normal.

## (undated) DEVICE — DRY-DOC CANNULA WITH DISPOSABLE OBTURATOR, 8.0 X 85 MM: Brand: DRY-DOC

## (undated) DEVICE — SURGICAL GOWN, XL SMARTSLEEVE: Brand: CONVERTORS

## (undated) DEVICE — SURGI KIT INSTRUMENT ORGANIZER

## (undated) DEVICE — TUBING SUCTION 5MM X 12 FT

## (undated) DEVICE — THREE-QUARTER SHEET: Brand: CONVERTORS

## (undated) DEVICE — NEEDLE SPINAL18G X 3.5 IN QUINCKE

## (undated) DEVICE — SUT ETHILON 3-0 PS-1 18 IN 1663H

## (undated) DEVICE — GLOVE INDICATOR PI UNDERGLOVE SZ 8 BLUE

## (undated) DEVICE — 3M™ STERI-DRAPE™ U-DRAPE 1015: Brand: STERI-DRAPE™

## (undated) DEVICE — SPONGE LAP 18 X 18 IN

## (undated) DEVICE — SHOULDER SUSPENSION KIT 6 PER BOX

## (undated) DEVICE — MAT FLOOR STEP DRI 24 X 36 IN N-STRL

## (undated) DEVICE — GLOVE SRG BIOGEL 8

## (undated) DEVICE — 1820 FOAM BLOCK NEEDLE COUNTER: Brand: DEVON

## (undated) DEVICE — TRANSPOSAL ULTRAFLEX DUO/QUAD ULTRA CART MANIFOLD

## (undated) DEVICE — SYRINGE 20ML LL

## (undated) DEVICE — SKIN MARKER DUAL TIP WITH RULER CAP, FLEXIBLE RULER AND LABELS: Brand: DEVON

## (undated) DEVICE — CHLORAPREP HI-LITE 26ML ORANGE

## (undated) DEVICE — ABDOMINAL PAD: Brand: DERMACEA

## (undated) DEVICE — ELECTRODE ELECSURG SUPER TURBOVAC 90 3.75MM X 5.4 IN

## (undated) DEVICE — GAUZE SPONGES,USP TYPE VII GAUZE, 12 PLY: Brand: CURITY

## (undated) DEVICE — 3M™ IOBAN™ 2 ANTIMICROBIAL INCISE DRAPE 6648EZ: Brand: IOBAN™ 2

## (undated) DEVICE — INTENDED FOR TISSUE SEPARATION, AND OTHER PROCEDURES THAT REQUIRE A SHARP SURGICAL BLADE TO PUNCTURE OR CUT.: Brand: BARD-PARKER ® CARBON RIB-BACK BLADES

## (undated) DEVICE — ALL PURPOSE SPONGES,NON-WOVEN, 4 PLY: Brand: CURITY

## (undated) DEVICE — NEEDLE 21 G X 1

## (undated) DEVICE — PACK UNIVERSAL ARTHRSCOPY PBDS

## (undated) DEVICE — OCCLUSIVE GAUZE STRIP,3% BISMUTH TRIBROMOPHENATE IN PETROLATUM BLEND: Brand: XEROFORM

## (undated) DEVICE — TUBING ARTHROSCOPIC WAVE  MAIN PUMP

## (undated) DEVICE — LIGHT GLOVE GREEN

## (undated) DEVICE — BLADE SHAVER EXCALIBUR 4MM 13CM COOLCUT